# Patient Record
Sex: FEMALE | Race: BLACK OR AFRICAN AMERICAN | NOT HISPANIC OR LATINO | Employment: FULL TIME | URBAN - METROPOLITAN AREA
[De-identification: names, ages, dates, MRNs, and addresses within clinical notes are randomized per-mention and may not be internally consistent; named-entity substitution may affect disease eponyms.]

---

## 2022-12-17 ENCOUNTER — HOSPITAL ENCOUNTER (OUTPATIENT)
Facility: HOSPITAL | Age: 27
Setting detail: OBSERVATION
Discharge: HOME | End: 2022-12-18
Attending: EMERGENCY MEDICINE | Admitting: EMERGENCY MEDICINE

## 2022-12-17 DIAGNOSIS — R10.31 RIGHT LOWER QUADRANT ABDOMINAL PAIN: Primary | ICD-10-CM

## 2022-12-17 PROBLEM — R10.9 ABDOMINAL PAIN: Status: ACTIVE | Noted: 2022-12-17

## 2022-12-17 LAB
ALBUMIN SERPL-MCNC: 4.6 G/DL (ref 3.5–5.2)
ALBUMIN/GLOB SERPL: 1.5 G/DL
ALP SERPL-CCNC: 56 U/L (ref 39–117)
ALT SERPL W P-5'-P-CCNC: 61 U/L (ref 1–33)
ANION GAP SERPL CALCULATED.3IONS-SCNC: 10 MMOL/L (ref 5–15)
AST SERPL-CCNC: 29 U/L (ref 1–32)
BASOPHILS # BLD AUTO: 0.02 10*3/MM3 (ref 0–0.2)
BASOPHILS NFR BLD AUTO: 0.4 % (ref 0–1.5)
BILIRUB SERPL-MCNC: 0.4 MG/DL (ref 0–1.2)
BILIRUB UR QL STRIP: NEGATIVE
BUN SERPL-MCNC: 10 MG/DL (ref 6–20)
BUN/CREAT SERPL: 14.3 (ref 7–25)
CALCIUM SPEC-SCNC: 9.2 MG/DL (ref 8.6–10.5)
CHLORIDE SERPL-SCNC: 103 MMOL/L (ref 98–107)
CLARITY UR: CLEAR
CO2 SERPL-SCNC: 24 MMOL/L (ref 22–29)
COLOR UR: YELLOW
CREAT SERPL-MCNC: 0.7 MG/DL (ref 0.57–1)
DEPRECATED RDW RBC AUTO: 41.8 FL (ref 37–54)
EGFRCR SERPLBLD CKD-EPI 2021: 122.5 ML/MIN/1.73
EOSINOPHIL # BLD AUTO: 0.07 10*3/MM3 (ref 0–0.4)
EOSINOPHIL NFR BLD AUTO: 1.4 % (ref 0.3–6.2)
ERYTHROCYTE [DISTWIDTH] IN BLOOD BY AUTOMATED COUNT: 12.7 % (ref 12.3–15.4)
GLOBULIN UR ELPH-MCNC: 3.1 GM/DL
GLUCOSE SERPL-MCNC: 81 MG/DL (ref 65–99)
GLUCOSE UR STRIP-MCNC: NEGATIVE MG/DL
HCG INTACT+B SERPL-ACNC: 6.42 MIU/ML
HCT VFR BLD AUTO: 38.3 % (ref 34–46.6)
HGB BLD-MCNC: 12.6 G/DL (ref 12–15.9)
HGB UR QL STRIP.AUTO: NEGATIVE
IMM GRANULOCYTES # BLD AUTO: 0.01 10*3/MM3 (ref 0–0.05)
IMM GRANULOCYTES NFR BLD AUTO: 0.2 % (ref 0–0.5)
KETONES UR QL STRIP: NEGATIVE
LEUKOCYTE ESTERASE UR QL STRIP.AUTO: NEGATIVE
LYMPHOCYTES # BLD AUTO: 1.24 10*3/MM3 (ref 0.7–3.1)
LYMPHOCYTES NFR BLD AUTO: 25.7 % (ref 19.6–45.3)
MCH RBC QN AUTO: 29.2 PG (ref 26.6–33)
MCHC RBC AUTO-ENTMCNC: 32.9 G/DL (ref 31.5–35.7)
MCV RBC AUTO: 88.7 FL (ref 79–97)
MONOCYTES # BLD AUTO: 0.7 10*3/MM3 (ref 0.1–0.9)
MONOCYTES NFR BLD AUTO: 14.5 % (ref 5–12)
NEUTROPHILS NFR BLD AUTO: 2.79 10*3/MM3 (ref 1.7–7)
NEUTROPHILS NFR BLD AUTO: 57.8 % (ref 42.7–76)
NITRITE UR QL STRIP: NEGATIVE
NRBC BLD AUTO-RTO: 0 /100 WBC (ref 0–0.2)
PH UR STRIP.AUTO: 7.5 [PH] (ref 5–8)
PLATELET # BLD AUTO: 315 10*3/MM3 (ref 140–450)
PMV BLD AUTO: 8.8 FL (ref 6–12)
POTASSIUM SERPL-SCNC: 3.7 MMOL/L (ref 3.5–5.2)
PROT SERPL-MCNC: 7.7 G/DL (ref 6–8.5)
PROT UR QL STRIP: NEGATIVE
RBC # BLD AUTO: 4.32 10*6/MM3 (ref 3.77–5.28)
SODIUM SERPL-SCNC: 137 MMOL/L (ref 136–145)
SP GR UR STRIP: 1.02 (ref 1–1.03)
UROBILINOGEN UR QL STRIP: NORMAL
WBC NRBC COR # BLD: 4.83 10*3/MM3 (ref 3.4–10.8)

## 2022-12-17 PROCEDURE — 85025 COMPLETE CBC W/AUTO DIFF WBC: CPT | Performed by: PHYSICIAN ASSISTANT

## 2022-12-17 PROCEDURE — 81003 URINALYSIS AUTO W/O SCOPE: CPT | Performed by: PHYSICIAN ASSISTANT

## 2022-12-17 PROCEDURE — 80053 COMPREHEN METABOLIC PANEL: CPT | Performed by: PHYSICIAN ASSISTANT

## 2022-12-17 PROCEDURE — 84702 CHORIONIC GONADOTROPIN TEST: CPT | Performed by: PHYSICIAN ASSISTANT

## 2022-12-17 PROCEDURE — G0378 HOSPITAL OBSERVATION PER HR: HCPCS

## 2022-12-17 PROCEDURE — 99285 EMERGENCY DEPT VISIT HI MDM: CPT

## 2022-12-17 RX ORDER — ONDANSETRON 2 MG/ML
4 INJECTION INTRAMUSCULAR; INTRAVENOUS EVERY 6 HOURS PRN
Status: DISCONTINUED | OUTPATIENT
Start: 2022-12-17 | End: 2022-12-18 | Stop reason: HOSPADM

## 2022-12-17 RX ORDER — HYDROCODONE BITARTRATE AND ACETAMINOPHEN 5; 325 MG/1; MG/1
1 TABLET ORAL ONCE
Status: COMPLETED | OUTPATIENT
Start: 2022-12-17 | End: 2022-12-17

## 2022-12-17 RX ORDER — SODIUM CHLORIDE 9 MG/ML
40 INJECTION, SOLUTION INTRAVENOUS AS NEEDED
Status: DISCONTINUED | OUTPATIENT
Start: 2022-12-17 | End: 2022-12-18 | Stop reason: HOSPADM

## 2022-12-17 RX ORDER — ONDANSETRON 4 MG/1
4 TABLET, FILM COATED ORAL EVERY 6 HOURS PRN
Status: DISCONTINUED | OUTPATIENT
Start: 2022-12-17 | End: 2022-12-18 | Stop reason: HOSPADM

## 2022-12-17 RX ORDER — SODIUM CHLORIDE 0.9 % (FLUSH) 0.9 %
10 SYRINGE (ML) INJECTION AS NEEDED
Status: DISCONTINUED | OUTPATIENT
Start: 2022-12-17 | End: 2022-12-18 | Stop reason: HOSPADM

## 2022-12-17 RX ORDER — SODIUM CHLORIDE 0.9 % (FLUSH) 0.9 %
10 SYRINGE (ML) INJECTION EVERY 12 HOURS SCHEDULED
Status: DISCONTINUED | OUTPATIENT
Start: 2022-12-17 | End: 2022-12-18 | Stop reason: HOSPADM

## 2022-12-17 RX ORDER — ACETAMINOPHEN 325 MG/1
650 TABLET ORAL EVERY 4 HOURS PRN
Status: DISCONTINUED | OUTPATIENT
Start: 2022-12-17 | End: 2022-12-18 | Stop reason: HOSPADM

## 2022-12-17 RX ADMIN — Medication 10 ML: at 20:02

## 2022-12-17 RX ADMIN — HYDROCODONE BITARTRATE AND ACETAMINOPHEN 1 TABLET: 5; 325 TABLET ORAL at 20:01

## 2022-12-17 NOTE — ED PROVIDER NOTES
" EMERGENCY DEPARTMENT ENCOUNTER    Room Number:  112/1  Date seen:  12/17/2022  PCP: System, Provider Not In  Historian: Patient      HPI:  Chief Complaint: Abdominal pain  A complete HPI/ROS/PMH/PSH/SH/FH are unobtainable due to: None  Context: Jessy Hope is a 27 y.o. female who presents to the ED c/o lower abdominal pain x 1 week. She denies nausea, vomiting, diarrhea, urinary symptoms, chest pain, soa, fevers, or chills. Pt denies abdominal surgeries. Pt was seen at urgent care prior to arrival and states she was told to come to ER for further evaluation due to a possible \"faintly positive\" pregnancy test. Pt states she has regular periods and last period was 11/19/2022. No hx of ectopic pregnancy or PID.            PAST MEDICAL HISTORY  Active Ambulatory Problems     Diagnosis Date Noted   • No Active Ambulatory Problems     Resolved Ambulatory Problems     Diagnosis Date Noted   • No Resolved Ambulatory Problems     No Additional Past Medical History         PAST SURGICAL HISTORY  No past surgical history on file.      FAMILY HISTORY  Family History   Problem Relation Age of Onset   • Diabetes Mother    • Hypertension Mother    • Diabetes Father    • Hypertension Father    • Diabetes Brother          SOCIAL HISTORY  Social History     Socioeconomic History   • Marital status: Single   Tobacco Use   • Smoking status: Never   • Smokeless tobacco: Never         ALLERGIES  Morphine        REVIEW OF SYSTEMS  Review of Systems   Constitutional: Negative for chills and fever.   Respiratory: Negative for cough and shortness of breath.    Cardiovascular: Negative for chest pain.   Gastrointestinal: Positive for abdominal pain. Negative for constipation, diarrhea, nausea and vomiting.   Genitourinary: Negative for dysuria, flank pain, vaginal bleeding and vaginal discharge.          PHYSICAL EXAM  ED Triage Vitals [12/17/22 1320]   Temp Heart Rate Resp BP SpO2   98.3 °F (36.8 °C) 101 18 136/90 98 %      Temp src " Heart Rate Source Patient Position BP Location FiO2 (%)   Tympanic Monitor -- -- --       Physical Exam      GENERAL: no acute distress, nontoxic  HENT: nares patent  EYES: no scleral icterus  CV: regular rhythm, normal rate  RESPIRATORY: normal effort, CTAB  ABDOMEN: soft, mild suprapubic and RLQ discomfort to palpation, no guarding, no rigidity  MUSCULOSKELETAL: no deformity  NEURO: alert, moves all extremities, follows commands  PSYCH:  calm, cooperative  SKIN: warm, dry    Vital signs and nursing notes reviewed.          LAB RESULTS  Labs Reviewed   COMPREHENSIVE METABOLIC PANEL - Abnormal; Notable for the following components:       Result Value    ALT (SGPT) 61 (*)     All other components within normal limits    Narrative:     GFR Normal >60  Chronic Kidney Disease <60  Kidney Failure <15     CBC WITH AUTO DIFFERENTIAL - Abnormal; Notable for the following components:    Monocyte % 14.5 (*)     All other components within normal limits   CBC (NO DIFF) - Abnormal; Notable for the following components:    RDW 12.1 (*)     All other components within normal limits   URINALYSIS W/ MICROSCOPIC IF INDICATED (NO CULTURE) - Normal    Narrative:     Urine microscopic not indicated.   BASIC METABOLIC PANEL - Normal    Narrative:     GFR Normal >60  Chronic Kidney Disease <60  Kidney Failure <15     HCG, QUANTITATIVE, PREGNANCY    Narrative:     HCG Ranges by Gestational Age    Females - non-pregnant premenopausal   </= 1mIU/mL HCG  Females - postmenopausal               </= 7mIU/mL HCG    3 Weeks       5.4   -      72 mIU/mL  4 Weeks      10.2   -     708 mIU/mL  5 Weeks       217   -   8,245 mIU/mL  6 Weeks       152   -  32,177 mIU/mL  7 Weeks     4,059   - 153,767 mIU/mL  8 Weeks    31,366   - 149,094 mIU/mL  9 Weeks    59,109   - 135,901 mIU/mL  10 Weeks   44,186   - 170,409 mIU/mL  12 Weeks   27,107   - 201,615 mIU/mL  14 Weeks   24,302   -  93,646 mIU/mL  15 Weeks   12,540   -  69,747 mIU/mL  16 Weeks    8,904    -  55,332 mIU/mL  17 Weeks    8,240   -  51,793 mIU/mL  18 Weeks    9,649   -  55,271 mIU/mL    Results may be falsely decreased if patient taking Biotin.     HCG, QUANTITATIVE, PREGNANCY    Narrative:     HCG Ranges by Gestational Age    Females - non-pregnant premenopausal   </= 1mIU/mL HCG  Females - postmenopausal               </= 7mIU/mL HCG    3 Weeks       5.4   -      72 mIU/mL  4 Weeks      10.2   -     708 mIU/mL  5 Weeks       217   -   8,245 mIU/mL  6 Weeks       152   -  32,177 mIU/mL  7 Weeks     4,059   - 153,767 mIU/mL  8 Weeks    31,366   - 149,094 mIU/mL  9 Weeks    59,109   - 135,901 mIU/mL  10 Weeks   44,186   - 170,409 mIU/mL  12 Weeks   27,107   - 201,615 mIU/mL  14 Weeks   24,302   -  93,646 mIU/mL  15 Weeks   12,540   -  69,747 mIU/mL  16 Weeks    8,904   -  55,332 mIU/mL  17 Weeks    8,240   -  51,793 mIU/mL  18 Weeks    9,649   -  55,271 mIU/mL    Results may be falsely decreased if patient taking Biotin.     CBC AND DIFFERENTIAL    Narrative:     The following orders were created for panel order CBC & Differential.  Procedure                               Abnormality         Status                     ---------                               -----------         ------                     CBC Auto Differential[[id number]]        Abnormal            Final result                 Please view results for these tests on the individual orders.       Ordered the above labs and reviewed the results.        RADIOLOGY  No Radiology Exams Resulted Within Past 24 Hours    none          PROCEDURES  Procedures        MEDICATIONS GIVEN IN ER  Medications   sodium chloride 0.9 % flush 10 mL (10 mL Intravenous Given 12/18/22 0903)   sodium chloride 0.9 % flush 10 mL (has no administration in time range)   sodium chloride 0.9 % infusion 40 mL (has no administration in time range)   ondansetron (ZOFRAN) tablet 4 mg (has no administration in time range)     Or   ondansetron (ZOFRAN) injection 4 mg (has no  administration in time range)   acetaminophen (TYLENOL) tablet 650 mg (has no administration in time range)   lactated ringers infusion (125 mL/hr Intravenous Currently Infusing 12/18/22 0559)   HYDROcodone-acetaminophen (NORCO) 5-325 MG per tablet 1 tablet (1 tablet Oral Given 12/17/22 2001)   HYDROcodone-acetaminophen (NORCO) 5-325 MG per tablet 1 tablet (1 tablet Oral Given 12/18/22 0409)           MEDICAL DECISION MAKING, PROGRESS, and CONSULTS    All labs have been independently reviewed by me.  All radiology studies have been reviewed by me and I have also reviewed the radiology report.   EKG's independently viewed and interpreted by me.  Discussion below represents my analysis of pertinent findings related to patient's condition, differential diagnosis, treatment plan and final disposition.      Additional sources:    - External (non-ED) record review: Reviewed urgent care visit prior to arrival.      Orders placed during this visit:  Orders Placed This Encounter   Procedures   • MRI Abdomen Without Contrast   • Comprehensive Metabolic Panel   • hCG, Quantitative, Pregnancy   • Urinalysis With Microscopic If Indicated (No Culture) - Urine, Clean Catch   • CBC Auto Differential   • Potassium   • Magnesium   • Troponin   • Blood Gas, Arterial -   • CBC (No Diff)   • Basic Metabolic Panel   • hCG, Quantitative, Pregnancy   • NPO Diet NPO Type: Strict NPO   • Continuous Cardiac Monitoring   • Telemetry - Pulse Oximetry   • May Be Off Telemetry for Tests   • Notify Provider if ACLS Protocol Activated   • Intake & Output   • Weigh Patient   • Saline Lock & Maintain IV Access   • Vital Signs   • Activity - Ad Annelise   • Code Status and Medical Interventions:   • OB/GYN (on-call MD unless specified)   • Inpatient General Surgery Consult   • Oxygen Therapy- Nasal Cannula; Titrate for SPO2: 90% - 95%   • ECG 12 Lead Other; Acute Chest Pain or Dysrhythmia   • Insert Peripheral IV   • Initiate ED Observation Status   • CBC  & Differential         Additional orders considered but not ordered:  Us appendix-likely not going to be helpful due to patient's body habitus.    CT abd/pelvis for evaluation due to hcg quant being less than threshold however patient does not wish to proceed due to concern she could potentially be pregnant.        Differential diagnosis:    My differential diagnosis for abdominal pain includes but is not limited to:    Gastritis, gastroenteritis, peptic ulcer disease, GERD, esophageal perforation, acute appendicitis, mesenteric adenitis, Meckel’s diverticulum, epiploic appendagitis, diverticulitis, colon cancer, ulcerative colitis, Crohn’s disease, intussusception, small bowel obstruction, adhesions, ischemic bowel, perforated viscus, ileus, obstipation, biliary colic, cholecystitis, cholelithiasis, Ashkan-Iban Severiano, hepatitis, pancreatitis, common bile duct obstruction, cholangitis, bile leak, splenic trauma, splenic rupture, splenic infarction, splenic abscess, abdominal abscess, ascites, spontaneous bacterial peritonitis, hernia, UTI, cystitis, ureterolithiasis, urinary obstruction, ovarian cyst, torsion, pregnancy, ectopic pregnancy, PID, pelvic abscess, mittelschmerz, endometriosis, AAA, myocardial infarction, pneumonia, cancer, porphyria, DKA, medications, sickle cell, viral syndrome, herpes zoster        Independent interpretation of labs, radiology studies, and discussions with consultants:  ED Course as of 12/18/22 1154   Sat Dec 17, 2022   1453 WBC: 4.83 [DC]   1453 Hemoglobin: 12.6 [DC]   1505 HCG Quantitative: 6.42 [DC]   1508 We will consult OB/GYN regarding hCG quantitative level and whether or not we can proceed with CT imaging for right lower quadrant pain. [DC]   1510 Discussed case with OBGYN, Dr. Jolley, who states hCG quantitative can be seen as negative pregnancy test.  States okay to move forward with CT imaging if needed. [DC]   1513 Discussed with patient her lab results.  Relayed that I had  discussed with OB/GYN and it would be okay to proceed with CT imaging for further evaluation of her right lower quadrant pain.  Discussed that I cannot rule out intra-abdominal pathology such as appendicitis without CT imaging.  She would like to hold off on any additional imaging at this time and will plan to return if her symptoms worsen.  Labs are reassuring speculate this is reasonable at this time. [DC]   1530 Patient seen by attending physician Dr. Batista.  Recommending admission for MRI of abdomen pelvis to evaluate for appendicitis.  Patient does not want CT due to her concern that she is pregnant.   [DC]   1558 Discussed case with Beatriz Lujan, TRACEE with [facility], who has consulted with supervising physician Dr. Bird and will accept admission to the observation unit. [DC]      ED Course User Index  [DC] Mahsa Vaca PA              Patient was placed in face mask in first look. Patient was wearing facemask when I entered the room and throughout our encounter. I wore full protective equipment throughout this patient encounter including a face mask, and gloves. Hand hygiene was performed before donning protective equipment and after removal when leaving the room.      DIAGNOSIS  Final diagnoses:   Right lower quadrant abdominal pain         DISPOSITION  Admit to Observation            Latest Documented Vital Signs:  As of 11:54 EST  BP- 106/63 HR- 70 Temp- 98.3 °F (36.8 °C) (Oral) O2 sat- 99%        --    Please note that portions of this were completed with a voice recognition program.       Note Disclaimer: At [facility], we believe that sharing information builds trust and better relationships. You are receiving this note because you are receiving care at [facility] or recently visited. It is possible you will see health information before a provider has talked with you about it. This kind of information can be easy to misunderstand. To help you fully understand what it means for your health, we  urge you to discuss this note with your provider.           Mahsa Vaca PA  12/18/22 0142

## 2022-12-17 NOTE — H&P
[facility]   HISTORY AND PHYSICAL    Patient Name: Jessy Hope  : 1995  MRN: [id number]  Primary Care Physician:  System, Provider Not In  Date of admission: 2022    Subjective   Subjective     Chief Complaint:   Chief Complaint   Patient presents with   • Abdominal Pain         HPI:    Jessy Hope is a 27 y.o. female -1-1-1, trisomy 18 fetus in second pregnancy, who presented to the emergency department today complaining of right lower quadrant abdominal pain.  Patient states pain started near her bellybutton approximately 4 days ago and has migrated to right lower quadrant.  Patient states last menstrual period was on .  Patient states when she developed abdominal pain she took pregnancy test at home and it was a very faint positive.  Patient then presented to urgent care where she had another pregnancy test that was again a very faint positive. Urgent care therefore sent patient to the emergency department.  Patient reports abdominal pain currently 8 out of 10.    In emergency department patient's hCG quantitative was 6.42.  ED provider discussed with OB/GYN who did not feel this was consistent with positive pregnancy test.  However, patient did not want to get CT scan as she felt there was still possibility of her being pregnant.  ED provider therefore wanted to pursue MRI of abdomen to rule out appendicitis.  Patient's laboratory evaluation is entirely within normal limits.  Patient heart rate 101 on arrival, otherwise she has not been tachycardic and she is not febrile.  Patient denies chest pain, shortness of breath, palpitations, nausea, vomiting, diarrhea, fever, chills, recent sick contacts.    Review of Systems   All systems were reviewed and negative except for: What is discussed in the HPI    Personal History     No past medical history on file.    No past surgical history on file.    Family History: family history includes Diabetes in her brother, father, and mother;  Hypertension in her father and mother. Otherwise pertinent FHx was reviewed and not pertinent to current issue.    Social History:  reports that she has never smoked. She has never used smokeless tobacco.    Home Medications:       Allergies:  Allergies   Allergen Reactions   • Morphine Hives       Objective   Objective     Vitals:   Temp:  [98.3 °F (36.8 °C)-98.8 °F (37.1 °C)] 98.8 °F (37.1 °C)  Heart Rate:  [] 79  Resp:  [18] 18  BP: (122-136)/(79-90) 128/80  Physical Exam     GENERAL: 27 y.o. YO female a/o x 4, NAD  SKIN: Warm pink and dry   HEENT:  PERRL, EOM intact, conjunctivae normal, sclerae clear  NECK: supple, no JVD  LUNGS: Clear to auscultation bilaterally without wheezes, rales or rhonchi.  No accessory muscle use and no nasal flaring.  CARDIAC:  Regular rate and rhythm, S1-S2.  No murmurs, rubs or gallops.  No peripheral edema.  Equal pulses bilaterally.  ABDOMEN: Soft, tender to palpation right lower quadrant at McBurney's point, psoas sign negative, obturator sign negative, no rebound tenderness, nondistended.  No guarding or rebound tenderness.  Normal bowel sounds.  MUSCULOSKELETAL: Moves all extremities well.  No deformity.  NEURO: Cranial nerves II through XII grossly intact.  No gross focal deficits.  Alert.  Normal speech and motor.  PSYCH: Normal mood and affect      Result Review    Result Review:  I have personally reviewed the results from the time of this admission to 12/17/2022 17:56 EST and agree with these findings:  [x]  Laboratory list / accordion  []  Microbiology  []  Radiology  []  EKG/Telemetry   []  Cardiology/Vascular   []  Pathology  []  Old records  []  Other:  Most notable findings include: hCG quantitative 6.42      Assessment & Plan   Assessment / Plan     Brief Patient Summary:  Jessy Hope is a 27 y.o. female who is being admitted to the observation unit for further evaluation of abdominal pain in the setting of potential very early pregnancy.  Patient does not  want to pursue CT scan at this time and therefore MRI abdomen will be obtained to rule out appendicitis.    Active Hospital Problems:  Active Hospital Problems    Diagnosis    • **Abdominal pain      Plan:     Abdominal pain  -Patient with 2 faint positive pregnancy test, quantitative hCG in ED is 6.42  -We will obtain MRI abdomen to rule out appendicitis  -General surgery consult pending results of imaging  -Monitor for now      DVT prophylaxis:  Mechanical DVT prophylaxis orders are present.    CODE STATUS:    Level Of Support Discussed With: Patient  Code Status (Patient has no pulse and is not breathing): CPR (Attempt to Resuscitate)  Medical Interventions (Patient has pulse or is breathing): Full Support    Admission Status:  I believe this patient meets observation status.    I wore an N95 mask, face shield, and gloves during this patient encounter. Patient also wearing a surgical mask throughout encounter. Hand hygeine performed before and after seeing the patient.    Electronically signed by ERMIAS Adame, 12/17/22, 5:44 PM EST.

## 2022-12-17 NOTE — DISCHARGE INSTRUCTIONS
Follow-up with your primary care provider and OB/GYN.  Return to the emergency department with worsening symptoms, uncontrolled pain, inability to tolerate oral liquids, fever greater than 101°F not controlled by Tylenol or as needed with emergent concerns.

## 2022-12-17 NOTE — ED NOTES
.Nursing report ED to floor  Jessy Hope  27 y.o.  female    HPI :   Chief Complaint   Patient presents with    Abdominal Pain       Admitting doctor:   Rigo Bird MD    Admitting diagnosis:   The encounter diagnosis was Right lower quadrant abdominal pain.    Code status:   Current Code Status       Date Active Code Status Order ID Comments User Context       12/17/2022 1559 CPR (Attempt to Resuscitate) [id number]  Beatriz Lujan PA ED        Question Answer    Code Status (Patient has no pulse and is not breathing) CPR (Attempt to Resuscitate)    Medical Interventions (Patient has pulse or is breathing) Full Support    Level Of Support Discussed With Patient                    Allergies:   Morphine    Isolation:   No active isolations    Intake and Output  No intake or output data in the 24 hours ending 12/17/22 1601    Weight:   There were no vitals filed for this visit.    Most recent vitals:   Vitals:    12/17/22 1320 12/17/22 1421 12/17/22 1451   BP: 136/90 122/79 126/80   BP Location:   Left arm   Patient Position:   Lying   Pulse: 101 86 88   Resp: 18  18   Temp: 98.3 °F (36.8 °C)     TempSrc: Tympanic     SpO2: 98% 100% 100%       Active LDAs/IV Access:   Lines, Drains & Airways       Active LDAs       Name Placement date Placement time Site Days    Peripheral IV 12/17/22 1431 Right Antecubital 12/17/22  1431  Antecubital  less than 1                    Labs (abnormal labs have a star):   Labs Reviewed   COMPREHENSIVE METABOLIC PANEL - Abnormal; Notable for the following components:       Result Value    ALT (SGPT) 61 (*)     All other components within normal limits    Narrative:     GFR Normal >60  Chronic Kidney Disease <60  Kidney Failure <15     CBC WITH AUTO DIFFERENTIAL - Abnormal; Notable for the following components:    Monocyte % 14.5 (*)     All other components within normal limits   URINALYSIS W/ MICROSCOPIC IF INDICATED (NO CULTURE) - Normal    Narrative:     Urine microscopic not  indicated.   HCG, QUANTITATIVE, PREGNANCY    Narrative:     HCG Ranges by Gestational Age    Females - non-pregnant premenopausal   </= 1mIU/mL HCG  Females - postmenopausal               </= 7mIU/mL HCG    3 Weeks       5.4   -      72 mIU/mL  4 Weeks      10.2   -     708 mIU/mL  5 Weeks       217   -   8,245 mIU/mL  6 Weeks       152   -  32,177 mIU/mL  7 Weeks     4,059   - 153,767 mIU/mL  8 Weeks    31,366   - 149,094 mIU/mL  9 Weeks    59,109   - 135,901 mIU/mL  10 Weeks   44,186   - 170,409 mIU/mL  12 Weeks   27,107   - 201,615 mIU/mL  14 Weeks   24,302   -  93,646 mIU/mL  15 Weeks   12,540   -  69,747 mIU/mL  16 Weeks    8,904   -  55,332 mIU/mL  17 Weeks    8,240   -  51,793 mIU/mL  18 Weeks    9,649   -  55,271 mIU/mL    Results may be falsely decreased if patient taking Biotin.     CBC AND DIFFERENTIAL    Narrative:     The following orders were created for panel order CBC & Differential.  Procedure                               Abnormality         Status                     ---------                               -----------         ------                     CBC Auto Differential[[id number]]        Abnormal            Final result                 Please view results for these tests on the individual orders.       EKG:   No orders to display       Meds given in ED:   Medications   sodium chloride 0.9 % flush 10 mL (has no administration in time range)   sodium chloride 0.9 % flush 10 mL (has no administration in time range)   sodium chloride 0.9 % infusion 40 mL (has no administration in time range)   ondansetron (ZOFRAN) tablet 4 mg (has no administration in time range)     Or   ondansetron (ZOFRAN) injection 4 mg (has no administration in time range)   acetaminophen (TYLENOL) tablet 650 mg (has no administration in time range)       Imaging results:  No radiology results for the last day    Ambulatory status:   - ad gavin    Social issues:   Social History     Socioeconomic History    Marital status:  Single       NIH Stroke Scale:         Lora Sandoval RN  12/17/22 16:01 EST

## 2022-12-17 NOTE — ED TRIAGE NOTES
Patient to ed from  with complaints of abd pain. Patient states that she was seen at urgent care and received a positive pregnancy test. LMP nov 19th

## 2022-12-17 NOTE — PROGRESS NOTES
Clinical Pharmacy Services: Medication History    Jessy Hope is a 27 y.o. female presenting to [facility] for   Chief Complaint   Patient presents with   • Abdominal Pain       She  has no past medical history on file.    Allergies as of 12/17/2022 - Reviewed 12/17/2022   Allergen Reaction Noted   • Morphine Hives 12/17/2022       Medication information was obtained from: Patient  Pharmacy and Phone Number:     Prior to Admission Medications     None            Medication notes: Pt states she doesn't take any medications. Pt hasn't had any medications in the last three months.    This medication list is complete to the best of my knowledge as of 12/17/2022    Please call if questions.  Claire Whitaker  Medication History Technician   [phone]    12/17/2022 16:43 EST

## 2022-12-17 NOTE — CASE MANAGEMENT/SOCIAL WORK
Discharge Planning Assessment  [facility]     Patient Name: Jessy Hope  MRN: [id number]  Today's Date: 12/17/2022    Admit Date: 12/17/2022    Plan: Pt intends to return home upon discharge   Discharge Needs Assessment     Row Name 12/17/22 1651       Living Environment    People in Home significant other    Name(s) of People in Home Armando Shelley [phone]    Current Living Arrangements home    Primary Care Provided by self    Provides Primary Care For no one    Family Caregiver if Needed significant other    Family Caregiver Names Armando Shelley    Quality of Family Relationships helpful;involved    Able to Return to Prior Arrangements yes       Resource/Environmental Concerns    Resource/Environmental Concerns none    Transportation Concerns none       Transition Planning    Patient/Family Anticipates Transition to home;home with family    Patient/Family Anticipated Services at Transition none    Transportation Anticipated car, drives self;family or friend will provide       Discharge Needs Assessment    Readmission Within the Last 30 Days no previous admission in last 30 days    Equipment Currently Used at Home none    Concerns to be Addressed no discharge needs identified;denies needs/concerns at this time    Anticipated Changes Related to Illness none    Equipment Needed After Discharge none    Provided Post Acute Provider List? N/A    Provided Post Acute Provider Quality & Resource List? N/A               Discharge Plan     Row Name 12/17/22 1652       Plan    Plan Pt intends to return home upon discharge    Patient/Family in Agreement with Plan yes    Provided Post Acute Provider List? N/A    Provided Post Acute Provider Quality & Resource List? N/A    Plan Comments Spoke with pt at [facility]. Introduced self and explained role of CCP . Verified information on face sheet and that pt does have a PCP. Pt denies any difficulty paying for medications and that she obtains medications from [facility] on [address]. Pt is independent in ADL's and does not use any assistive devices. Denies the need for any assistive devices or community resources at this time. Advised pt to contact case management if any further needs arise              Continued Care and Services - Admitted Since 12/17/2022    Coordination has not been started for this encounter.          Demographic Summary    No documentation.                Functional Status    No documentation.                Psychosocial    No documentation.                Abuse/Neglect    No documentation.                Legal    No documentation.                Substance Abuse    No documentation.                Patient Forms    No documentation.                   Leah [place] RN

## 2022-12-17 NOTE — ED PROVIDER NOTES
Subjective   History of Present Illness  27-year-old female with past medical history here for chief complaint of right lower quad abdominal pain.  History was obtained from the patient.  The patient states that her symptoms started 1 week ago.  She states that she went to an urgent care clinic.  She states that her pregnancy test was faintly positive.  Therefore she was sent to the ED for further management.  Patient states that she does have right lower quadrant pain, nothing makes it worse, nothing makes it better, sharp.  She denies any urinary symptoms.  Patient denies any vaginal bleeding or discharge.  Patient denies any nausea, vomiting, diarrhea, fevers, chills, chest pain, shortness of breath, or any other symptoms.        Review of Systems   All other systems reviewed and are negative.      No past medical history on file.    Allergies   Allergen Reactions   • Morphine Hives       No past surgical history on file.    No family history on file.    Social History     Socioeconomic History   • Marital status: Single           Objective   Physical Exam  Constitutional:       General: She is not in acute distress.     Appearance: She is not ill-appearing, toxic-appearing or diaphoretic.   HENT:      Head: Normocephalic and atraumatic.      Mouth/Throat:      Mouth: Mucous membranes are moist.      Pharynx: Oropharynx is clear. No pharyngeal swelling or oropharyngeal exudate.   Eyes:      General: No scleral icterus.     Extraocular Movements: Extraocular movements intact.      Pupils: Pupils are equal, round, and reactive to light.   Cardiovascular:      Rate and Rhythm: Normal rate and regular rhythm.      Heart sounds: Normal heart sounds. No murmur heard.    No friction rub. No gallop.   Pulmonary:      Effort: Pulmonary effort is normal. No respiratory distress.      Breath sounds: Normal breath sounds. No stridor. No wheezing, rhonchi or rales.   Chest:      Chest wall: No tenderness.   Abdominal:       General: Bowel sounds are normal. There is no distension.      Palpations: Abdomen is soft. There is no shifting dullness or fluid wave.      Tenderness: There is abdominal tenderness in the right lower quadrant. There is no right CVA tenderness, left CVA tenderness, guarding or rebound.   Skin:     General: Skin is warm and dry.      Capillary Refill: Capillary refill takes less than 2 seconds.      Coloration: Skin is not cyanotic or jaundiced.   Neurological:      General: No focal deficit present.      Mental Status: She is alert and oriented to person, place, and time.      Motor: No weakness.   Psychiatric:         Mood and Affect: Mood normal.         Procedures           ED Course  ED Course as of 12/17/22 1640   Sat Dec 17, 2022   1453 WBC: 4.83 [DC]   1453 Hemoglobin: 12.6 [DC]   1505 HCG Quantitative: 6.42 [DC]   1508 We will consult OB/GYN regarding hCG quantitative level and whether or not we can proceed with CT imaging for right lower quadrant pain. [DC]   1510 Discussed case with OBGYN, Dr. Jolley, who states hCG quantitative can be seen as negative pregnancy test.  States okay to move forward with CT imaging if needed. [DC]   1513 Discussed with patient her lab results.  Relayed that I had discussed with OB/GYN and it would be okay to proceed with CT imaging for further evaluation of her right lower quadrant pain.  Discussed that I cannot rule out intra-abdominal pathology such as appendicitis without CT imaging.  She would like to hold off on any additional imaging at this time and will plan to return if her symptoms worsen.  Labs are reassuring speculate this is reasonable at this time. [DC]   1530 Patient seen by attending physician Dr. Batista.  Recommending admission for MRI of abdomen pelvis to evaluate for appendicitis.  Patient does not want CT due to her concern that she is pregnant.   [DC]   1558 Discussed case with Beatriz Lujan, TRACEE with [facility], who has consulted with supervising physician   Pelon and will accept admission to the observation unit. [DC]      ED Course User Index  [DC] Mahsa Vaca PA                                           MDM  Number of Diagnoses or Management Options     Amount and/or Complexity of Data Reviewed  Clinical lab tests: ordered and reviewed  Decide to obtain previous medical records or to obtain history from someone other than the patient: yes    Risk of Complications, Morbidity, and/or Mortality  General comments: When I first saw the patient, the patient appeared nontoxic.  Patient was stable.  Patient was complaining of right lower quad abdominal pain.  We did get IV and labs.  Patient's labs were remarkable for mild elevation in beta hCG.  Given this, we did call OB and speak to them.  OB did not feel like the patient had a positive test for pregnancy.  We did inform the patient of this.  The patient however was hesitant to get a CT scan given that she had taken a pregnancy test at home and she told me that she thought it was positive.  Given the 2 pregnancy tests she did not feel comfortable getting a CT scan.  Patient did continue to have right lower quad abdominal pain.  We did discuss the possibility of getting an MRI if she is worried to rule out appendicitis given that she did have right lower quad abdominal pain.  Patient is okay with getting an MRI because she was very worried about being pregnant.  Given this, patient was admitted to the ED observation unit.  We spoke to Beatriz who is excepted the patient.  I defer all further management of this patient to the ED observation unit.  Patient is admitted to the ED observation unit for serial abdominal exams.  Patient is stable in the ED.        Final diagnoses:   Right lower quadrant abdominal pain       ED Disposition  ED Disposition     ED Disposition   Decision to Admit    Condition   --    Comment   --             No follow-up provider specified.       Medication List      No changes were made to  your prescriptions during this visit.          Luis Batista MD  12/17/22 1640

## 2022-12-18 ENCOUNTER — APPOINTMENT (OUTPATIENT)
Dept: MRI IMAGING | Facility: HOSPITAL | Age: 27
End: 2022-12-18

## 2022-12-18 VITALS
RESPIRATION RATE: 16 BRPM | DIASTOLIC BLOOD PRESSURE: 81 MMHG | HEIGHT: 69 IN | OXYGEN SATURATION: 98 % | WEIGHT: 256.1 LBS | HEART RATE: 93 BPM | TEMPERATURE: 98.6 F | SYSTOLIC BLOOD PRESSURE: 109 MMHG | BODY MASS INDEX: 37.93 KG/M2

## 2022-12-18 LAB
ANION GAP SERPL CALCULATED.3IONS-SCNC: 9.1 MMOL/L (ref 5–15)
BUN SERPL-MCNC: 11 MG/DL (ref 6–20)
BUN/CREAT SERPL: 16.9 (ref 7–25)
CALCIUM SPEC-SCNC: 9.3 MG/DL (ref 8.6–10.5)
CHLORIDE SERPL-SCNC: 104 MMOL/L (ref 98–107)
CO2 SERPL-SCNC: 24.9 MMOL/L (ref 22–29)
CREAT SERPL-MCNC: 0.65 MG/DL (ref 0.57–1)
DEPRECATED RDW RBC AUTO: 37.4 FL (ref 37–54)
EGFRCR SERPLBLD CKD-EPI 2021: 124.7 ML/MIN/1.73
ERYTHROCYTE [DISTWIDTH] IN BLOOD BY AUTOMATED COUNT: 12.1 % (ref 12.3–15.4)
GLUCOSE SERPL-MCNC: 92 MG/DL (ref 65–99)
HCG INTACT+B SERPL-ACNC: 14.8 MIU/ML
HCT VFR BLD AUTO: 35.3 % (ref 34–46.6)
HGB BLD-MCNC: 12 G/DL (ref 12–15.9)
MCH RBC QN AUTO: 28.4 PG (ref 26.6–33)
MCHC RBC AUTO-ENTMCNC: 34 G/DL (ref 31.5–35.7)
MCV RBC AUTO: 83.6 FL (ref 79–97)
PLATELET # BLD AUTO: 338 10*3/MM3 (ref 140–450)
PMV BLD AUTO: 9 FL (ref 6–12)
POTASSIUM SERPL-SCNC: 3.5 MMOL/L (ref 3.5–5.2)
RBC # BLD AUTO: 4.22 10*6/MM3 (ref 3.77–5.28)
SODIUM SERPL-SCNC: 138 MMOL/L (ref 136–145)
WBC NRBC COR # BLD: 4.06 10*3/MM3 (ref 3.4–10.8)

## 2022-12-18 PROCEDURE — G0378 HOSPITAL OBSERVATION PER HR: HCPCS

## 2022-12-18 PROCEDURE — 99203 OFFICE O/P NEW LOW 30 MIN: CPT | Performed by: SURGERY

## 2022-12-18 PROCEDURE — 85027 COMPLETE CBC AUTOMATED: CPT | Performed by: PHYSICIAN ASSISTANT

## 2022-12-18 PROCEDURE — 84702 CHORIONIC GONADOTROPIN TEST: CPT | Performed by: PHYSICIAN ASSISTANT

## 2022-12-18 PROCEDURE — 74181 MRI ABDOMEN W/O CONTRAST: CPT

## 2022-12-18 PROCEDURE — 80048 BASIC METABOLIC PNL TOTAL CA: CPT | Performed by: PHYSICIAN ASSISTANT

## 2022-12-18 RX ORDER — HYDROCODONE BITARTRATE AND ACETAMINOPHEN 5; 325 MG/1; MG/1
1 TABLET ORAL ONCE
Status: COMPLETED | OUTPATIENT
Start: 2022-12-18 | End: 2022-12-18

## 2022-12-18 RX ORDER — SODIUM CHLORIDE, SODIUM LACTATE, POTASSIUM CHLORIDE, CALCIUM CHLORIDE 600; 310; 30; 20 MG/100ML; MG/100ML; MG/100ML; MG/100ML
125 INJECTION, SOLUTION INTRAVENOUS CONTINUOUS
Status: DISCONTINUED | OUTPATIENT
Start: 2022-12-18 | End: 2022-12-18 | Stop reason: HOSPADM

## 2022-12-18 RX ADMIN — SODIUM CHLORIDE, POTASSIUM CHLORIDE, SODIUM LACTATE AND CALCIUM CHLORIDE 125 ML/HR: 600; 310; 30; 20 INJECTION, SOLUTION INTRAVENOUS at 03:38

## 2022-12-18 RX ADMIN — Medication 10 ML: at 09:03

## 2022-12-18 RX ADMIN — HYDROCODONE BITARTRATE AND ACETAMINOPHEN 1 TABLET: 5; 325 TABLET ORAL at 04:09

## 2022-12-18 NOTE — PROGRESS NOTES
MD Attestation Note    I supervised care provided by the midlevel provider.    The TRACEE and I have discussed this patient's history, physical exam, and treatment plan. I have reviewed the documentation and personally had a face to face interaction with the patient  I affirm the documentation and agree with the treatment and plan.       My personal findings are:        Subjective:  Patient presents for evaluation of abdominal pain.  Is localized to the right lower quadrant.  She had a positive pregnancy test yesterday.  Serum hCG was only 6.  She reports she has not had intercourse in the last 1 week and she is not even late for her menstrual period yet.  She does not have vaginal bleeding.  She denies fever or chills.        Objective:  GENERAL: Awake, alert, in no acute distress.     HEENT:  Normocephalic, atraumatic. Conjunctiva clear. Sclerae anicteric. Mucous membranes moist. Nares patent. Pupils equal, reactive. Extra-ocular movements normal.     RESPIRATORY: Lungs clear to auscultation bilaterally. Good air movement.   CARDIOVASCULAR: Regular rhythm, normal rate. No murmur. No chest wall tenderness.     ABDOMEN: Mild right lower quadrant tenderness without rebound or guarding.  Abdomen nondistended.  NEUROLOGIC: Cranial nerves II-XII normal. Motor strength 5/5 in extremities.   EXTREMITIES: No pedal edema. 2+ pedal pulses bilaterally. No deformity.     SKIN:  no rash, normal to inspection.        Assessment/ Plan:  Right lower quadrant abdominal pain  Patient evaluated by general surgery today.  They agreed that MRI of the abdomen is necessary to further rule out acute appendicitis.  I discussed with patient that MRI would be much safer than a CT of the abdomen in this circumstance, given concern for early pregnancy.  Patient is agreeable with plan to proceed with MRI.  If MRI demonstrates no acute surgical pathology, patient will need to follow-up with OB/GYN for continued trending of her serum hCG and a  pelvic ultrasound in 1 to 2 weeks.

## 2022-12-18 NOTE — CONSULTS
[facility]   Consult Note    Patient Name: Jessy Hope  : 1995  MRN: [id number]  Primary Care Physician:  System, Provider Not In  Referring Physician: Rigo Bird MD  Date of admission: 2022    Inpatient General Surgery Consult  Consult performed by: Lamonte Emery Jr., MD  Consult ordered by: Amy Marcano PA-C        Subjective   Subjective     Reason for Consult/ Chief Complaint: Abdominal pain    History of Present Illness  Jessy Hope is a pleasant 27 y.o. female who presented to the emergency room with abdominal pain.  She states that she has roughly a 1 week history of abdominal pain.  It began in the central abdomen and then slowly migrated to the right lower quadrant.  It has been located in the right lower quadrant for the past 3-4 days.  She has not had any nausea or vomiting and has been able to tolerate a regular diet.  Her bowel function has remained normal.  She has not had any fever nor night sweats.  The pain is constant.    She believes that she may be pregnant but is due to start her normal menstrual period tomorrow.  Beta-hCG was drawn which was mildly elevated and the patient did not want a CT scan of the abdomen and pelvis over concern that she may be early in a pregnancy.  OB/GYN has been consulted for their input.  An MRI has been ordered for evaluation of the right lower quadrant.    Review of Systems   Constitutional: Negative for fatigue and fever.   Respiratory: Negative for chest tightness and shortness of breath.    Cardiovascular: Negative for chest pain and palpitations.   Gastrointestinal: Positive for abdominal pain. Negative for blood in stool, constipation, diarrhea, nausea and vomiting.        Personal History     History reviewed. No pertinent past medical history.    No past surgical history on file.    Family History: family history includes Diabetes in her brother, father, and mother; Hypertension in her father and mother. Otherwise  pertinent FHx was reviewed and not pertinent to current issue.    Social History:  reports that she has never smoked. She has never used smokeless tobacco.    Home Medications:        Allergies:  Allergies   Allergen Reactions   • Morphine Hives       Objective    Objective     Vitals:  Temp:  [98.1 °F (36.7 °C)-98.8 °F (37.1 °C)] 98.1 °F (36.7 °C)  Heart Rate:  [] 76  Resp:  [16-18] 18  BP: (110-136)/(66-90) 110/87    Physical Exam  Constitutional:       Appearance: Normal appearance. She is well-developed. She is not toxic-appearing.   Eyes:      General: No scleral icterus.  Pulmonary:      Effort: Pulmonary effort is normal. No respiratory distress.   Abdominal:      Palpations: Abdomen is soft.      Tenderness: There is abdominal tenderness (Moderately tender to deep palpation) in the right lower quadrant. There is no guarding or rebound.   Skin:     General: Skin is warm and dry.   Neurological:      Mental Status: She is alert and oriented to person, place, and time.   Psychiatric:         Behavior: Behavior normal.         Thought Content: Thought content normal.         Judgment: Judgment normal.         Result Review    Result Review:  I have personally reviewed the results from the time of this admission to 12/18/2022 08:55 EST and agree with these findings:  [x]  Laboratory list / accordion  []  Microbiology  []  Radiology  []  EKG/Telemetry   []  Cardiology/Vascular   []  Pathology  []  Old records  []  Other:      Assessment & Plan   Assessment / Plan     Brief Patient Summary with assessment and plan:  Jessy Hope is a 27 y.o. female who presented to the emergency room with right lower quadrant abdominal pain.    1.  Right lower quadrant abdominal pain: The patient has right lower quadrant abdominal pain that has been present for at least several days, approaching 1 week.  She has tenderness in the right lower quadrant in the region of McBurney's point but the exam is overall benign with no  guarding or rebound.  Her white blood cell count is normal with a normal differential.  Clinically appendicitis appears unlikely but it is not impossible, especially if the appendix is in a retrocecal position.  Based on the persistence of her symptoms imaging is appropriate.  If there is concern that she may be early in her pregnancy, an MRI of the abdomen is safer.  I will await the results of the MRI.        Lamonte Emery Jr., MD

## 2022-12-18 NOTE — PLAN OF CARE
Goal Outcome Evaluation:  Plan of Care Reviewed With: patient   Pt has had a negative MRI. Seen by Dr Emery (surgery). Pt to be discharged home with appropriate follow up. Pt has been able to keep solid foods down. Pt alert and orient times 4, NAD, up ad gavin, RSR on the monitor and VSS. Pt given discharge instructions and all questions answered.      Progress: improving

## 2022-12-18 NOTE — PLAN OF CARE
Goal Outcome Evaluation:  Plan of Care Reviewed With: patient        Progress: no change  Outcome Evaluation: Patient is alert and oriented. Patient admitted to observation for abdominal pain. Patient is to have an MRI. Will continue to monitor.

## 2022-12-18 NOTE — PROGRESS NOTES
The MRI shows no evidence of acute appendicitis.  I started a clear liquid diet will be advanced as tolerated.

## 2022-12-18 NOTE — PLAN OF CARE
Goal Outcome Evaluation:  Plan of Care Reviewed With: patient        Progress: no change  Outcome Evaluation: A/O, medicated for pain X2 with relief, IVF infusing, NPO since MN, surgery consult pending, VSS, will continue to monitor.

## 2022-12-18 NOTE — PROGRESS NOTES
ED OBSERVATION PROGRESS/DISCHARGE SUMMARY    Date of Admission: 12/17/2022   LOS: 0 days   PCP: System, Provider Not In      Subjective    No acute events overnight.  Patient's pain has improved and she is tolerating diet.    Hospital Outcome:   27-year-old female admitted to the observation unit for further evaluation of right lower abdominal pain for 1 week.  Patient reported home pregnancy test that was faintly positive.  In the emergency department laboratory evaluation included an hCG's 6.  OB was consulted by the ER provider who recommended further evaluation with CT for suspicion of possible early appendicitis.  Patient was not comfortable and an MRI of the abdomen was ordered for further evaluation.  However upon discussion with the radiologist noted MRI of the abdomen is not available for pregnant patients at our hospital.  General surgery consulted and requested we do in fact obtain MRI of abdomen.    MRI abdomen obtained.  Risks versus benefits discussed with patient and she elected to proceed.  MRI shows no evidence of acute appendicitis.  Patient is tolerating diet.  She will be discharged home.  Advised patient we cannot definitively say if she has an early pregnancy or not.  Patient is due to start her period soon.  I recommended repeat pregnancy test in a week if she has not started her period.    ROS:  General: no fevers, chills  Respiratory: no cough, dyspnea  Cardiovascular: no chest pain, palpitations  Abdomen: No abdominal pain, nausea, vomiting, or diarrhea  Neurologic: No focal weakness    Objective   Physical Exam:  I have reviewed the vital signs.  Temp:  [98.3 °F (36.8 °C)-98.8 °F (37.1 °C)] 98.3 °F (36.8 °C)  Heart Rate:  [] 89  Resp:  [16-18] 18  BP: (121-136)/(66-90) 121/81  General Appearance:    Alert, cooperative, no distress  Head:    Normocephalic, atraumatic  Eyes:    Sclerae anicteric  Neck:   Supple, no mass  Lungs: Clear to auscultation bilaterally, respirations  unlabored  Heart: Regular rate and rhythm, S1 and S2 normal, no murmur, rub or gallop  Abdomen:  Soft, non-tender, bowel sounds active, nondistended  Extremities: No clubbing, cyanosis, or edema to lower extremities  Pulses:  2+ and symmetric in distal lower extremities  Skin: No rashes   Neurologic: Oriented x3, Normal strength to extremities    Results Review:    I have reviewed the labs, radiology results and diagnostic studies.    Results from last 7 days   Lab Units 12/17/22  1432   WBC 10*3/mm3 4.83   HEMOGLOBIN g/dL 12.6   HEMATOCRIT % 38.3   PLATELETS 10*3/mm3 315     Results from last 7 days   Lab Units 12/17/22  1432   SODIUM mmol/L 137   POTASSIUM mmol/L 3.7   CHLORIDE mmol/L 103   CO2 mmol/L 24.0   BUN mg/dL 10   CREATININE mg/dL 0.70   CALCIUM mg/dL 9.2   BILIRUBIN mg/dL 0.4   ALK PHOS U/L 56   ALT (SGPT) U/L 61*   AST (SGOT) U/L 29   GLUCOSE mg/dL 81     Imaging Results (Last 24 Hours)     Procedure Component Value Units Date/Time    MRI Abdomen Without Contrast [[id number]] Collected: 12/18/22 1256     Updated: 12/18/22 1307    Narrative:      MRI ABDOMEN WO CONTRAST-     INDICATIONS: Right-sided pain, possible appendicitis.     TECHNIQUE: Noncontrast MRI of the abdomen. Patient's provider indicated  prior to the procedure the the  potential benefits of the procedure  outweighed the potential risks.     COMPARISON: None available     FINDINGS:     No inflammatory changes are seen in the expected location of the  appendix. The appendix is not definitely identified, limiting the  assessment, but a structure thought likely to represent the appendix,  for example axial image 23, DICOM series 10, shows a normal caliber for  appendix, 5-6 mm.     The gallbladder is partly included with no stones or pericholecystic  fluid demonstrated.     No hydronephrosis is demonstrated.     The pancreas is partly included, with no inflammatory changes noted  around the pancreas.     A 1 cm left adnexal follicle or cyst is  present.     Small pelvic free fluid is noted, nonspecific.       Impression:         No appendicitis was identified, as described. Small nonspecific pelvic  free fluid. Follow-up as indications persist.     This report was finalized on 12/18/2022 1:04 PM by Dr. Rupetr Murphy M.D.             I have reviewed the medications.  ---------------------------------------------------------------------------------------------  Assessment & Plan   Assessment/Problem List    Abdominal pain      Plan:       Right lower quadrant abdominal pain  -Patient with 2 faint positive pregnancy test, quantitative hCG in ED is 6.42  -OB consulted by ER provider, noting HCcg can be seen as negative and stated okay to move forward with CT scan  -Patient did not feel comfortable with a CT scan therefore an MRI of the abdomen was ordered however after speaking with radiology, MRI of the abdomen is not available for pregnant patients here at the [facility]  -General surgery consulted, they have requested MRI abdomen    -MRI abdomen shows no evidence of acute appendicitis  -Discharge home         Disposition: Home    Follow-up after Discharge: PCP, OB/GYN    This note will serve as discharge summary    Amy Marcano PA-C 12/18/22 04:04 EST

## 2023-01-17 ENCOUNTER — OFFICE VISIT (OUTPATIENT)
Dept: OBSTETRICS AND GYNECOLOGY | Age: 28
End: 2023-01-17
Payer: COMMERCIAL

## 2023-01-17 VITALS
DIASTOLIC BLOOD PRESSURE: 82 MMHG | SYSTOLIC BLOOD PRESSURE: 120 MMHG | WEIGHT: 251.2 LBS | BODY MASS INDEX: 37.2 KG/M2 | HEIGHT: 69 IN

## 2023-01-17 DIAGNOSIS — Z20.2 EXPOSURE TO STD: ICD-10-CM

## 2023-01-17 DIAGNOSIS — Z3A.08 8 WEEKS GESTATION OF PREGNANCY: Primary | ICD-10-CM

## 2023-01-17 DIAGNOSIS — R63.8 INCREASED BMI: ICD-10-CM

## 2023-01-17 DIAGNOSIS — Z82.79 FAMILY HISTORY OF TRISOMY 18: ICD-10-CM

## 2023-01-17 DIAGNOSIS — Z13.29 SCREENING FOR THYROID DISORDER: ICD-10-CM

## 2023-01-17 DIAGNOSIS — O36.8990 UMBILICAL CORD CYST DURING PREGNANCY, ANTEPARTUM: ICD-10-CM

## 2023-01-17 PROBLEM — O41.8X10 SUBCHORIONIC HEMORRHAGE IN FIRST TRIMESTER: Status: ACTIVE | Noted: 2023-01-17

## 2023-01-17 PROBLEM — O46.8X1 SUBCHORIONIC HEMORRHAGE IN FIRST TRIMESTER: Status: ACTIVE | Noted: 2023-01-17

## 2023-01-17 PROCEDURE — 99213 OFFICE O/P EST LOW 20 MIN: CPT | Performed by: OBSTETRICS & GYNECOLOGY

## 2023-01-17 PROCEDURE — 99385 PREV VISIT NEW AGE 18-39: CPT | Performed by: OBSTETRICS & GYNECOLOGY

## 2023-01-17 PROCEDURE — 2014F MENTAL STATUS ASSESS: CPT | Performed by: OBSTETRICS & GYNECOLOGY

## 2023-01-17 RX ORDER — DOXYLAMINE SUCCINATE AND PYRIDOXINE HYDROCHLORIDE, DELAYED RELEASE TABLETS 10 MG/10 MG 10; 10 MG/1; MG/1
2 TABLET, DELAYED RELEASE ORAL NIGHTLY PRN
Qty: 60 TABLET | Refills: 1 | Status: SHIPPED | OUTPATIENT
Start: 2023-01-17 | End: 2023-03-29

## 2023-01-17 RX ORDER — CHOLECALCIFEROL (VITAMIN D3) 25 MCG
1 TABLET,CHEWABLE ORAL DAILY
Qty: 30 CAPSULE | Refills: 11 | Status: SHIPPED | OUTPATIENT
Start: 2023-01-17

## 2023-01-17 NOTE — PROGRESS NOTES
.  Subjective     Chief Complaint   Patient presents with   • Gynecologic Exam     New GYN, Pregnancy Confirmation, LMP 2022, U/S today, Pt C/O severe nausea and vomiting and fatigue        History of Present Illness    Jessy Hope is a 27 y.o.  who presents for pregnancy u/s and is also due for annual exam.  She denies any bleeding since her LMP. She has had some episodes of abd pain that resolved. She complains of constant nausea but reports she is not vomiting. She has fatigue as well.     She is  and works in housekeeping at [employer]. Her spouse is a letitia technician and works for [employer].   Obstetric History:  OB History        3    Para   2    Term   1       1    AB        Living   1       SAB        IAB        Ectopic        Molar        Multiple        Live Births   1               Menstrual History:     Patient's last menstrual period was 2022.         Current contraception: none  History of abnormal Pap smear: yes, neg f/u  Received Gardasil immunization: yes  Perform regular self breast exam: yes  Family history of uterine or ovarian cancer: no  Family History of colon cancer: no  Family history of breast cancer: PGM-after menopause    Mammogram: not indicated.  Colonoscopy: not indicated.  DEXA: not indicated.    Exercise: moderately active  Calcium/Vitamin D: adequate intake    The following portions of the patient's history were reviewed and updated as appropriate: allergies, current medications, past family history, past medical history, past social history, past surgical history and problem list.    Review of Systems    Review of Systems   Constitutional: Negative for fatigue.   Respiratory: Negative for shortness of breath.    Gastrointestinal: Negative for abdominal pain.   Genitourinary: Negative for abnormal bleeding  Neurological: Negative for headaches.   Psychiatric/Behavioral: Negative for dysphoric mood.         Objective   Physical  "Exam    /82   Ht 175.3 cm (69\")   Wt 114 kg (251 lb 3.2 oz)   LMP 11/19/2022   BMI 37.10 kg/m²   General:   Alert, in no distress   Heart: regular rate and rhythm   Lungs: clear to auscultation bilaterally   Breast: Inspection negative; no masses, retractions, nipple discharge or axillary adenopathy in either breast   Neck: Supple, no thyromegaly   Abdomen: Soft, no tenderness or guarding   Pelvis: External genitalia: normal general appearance  Urinary system: urethral meatus normal  Vaginal: normal mucosa without prolapse or lesions  Cervix: normal appearance  Adnexa: no masses or tenderness  Uterus: 8 weeks size, nontender   Extremities: Normal without edema   Neurologic: Alert and oriented   Psychiatric: Normal affect, judgment and mood     Assessment & Plan   Diagnoses and all orders for this visit:    1. 8 weeks gestation of pregnancy (Primary)  -     OB Panel With HIV  -     Urine Culture - Urine, Urine, Clean Catch  -     IGP, Rfx Aptima HPV ASCU    2. Screening for thyroid disorder  -     TSH    3. Exposure to STD  -     Chlamydia trachomatis, Neisseria gonorrhoeae, Trichomonas vaginalis, PCR - Swab, Urine, Clean Catch    4. Umbilical cord cyst during pregnancy, antepartum    5. Family history of trisomy 18    6. Increased BMI    Other orders  -     Prenatal Vit-Fe Sulfate-FA-DHA (Prenatal Vitamin/Min +DHA) 27-0.8-200 MG capsule; Take 1 tablet by mouth Daily.  Dispense: 30 capsule; Refill: 11  -     doxylamine-pyridoxine (DICLEGIS) 10-10 MG tablet delayed-release EC tablet; Take 2 tablets by mouth At Night As Needed (nausea and vomiting).  Dispense: 60 tablet; Refill: 1        All questions answered.  Breast self exam technique reviewed and patient encouraged to perform self-exam monthly.  Discussed healthy lifestyle modifications.  Recommended 30 minutes of aerobic exercise five times per week.    Reviewed u/s findings. Discussed umbilical cord cyst. Plan f/u u/s with next visit 3 weeks    Hx " trisomy 18: pt notes she was dx due to abnormal u/s findings. She delivered following FDIU. Plan aneuploidy screening at f/u visit    Reviewed pnguidelines and activity restrictions for pregnancy. Recommend she start PNV with DHA and orders sent. Discussed group coverage and OTC med list. Pt is not vomiting but reports constant nausea. Sent rx diclegis as needed and instructions reviewed. Advised pt to call if nausea worsens or regular vomiting noted.

## 2023-01-18 LAB
ABO GROUP BLD: NORMAL
BASOPHILS # BLD AUTO: 0 X10E3/UL (ref 0–0.2)
BASOPHILS NFR BLD AUTO: 0 %
BLD GP AB SCN SERPL QL: NEGATIVE
EOSINOPHIL # BLD AUTO: 0.1 X10E3/UL (ref 0–0.4)
EOSINOPHIL NFR BLD AUTO: 1 %
ERYTHROCYTE [DISTWIDTH] IN BLOOD BY AUTOMATED COUNT: 12.6 % (ref 11.7–15.4)
HBV SURFACE AG SERPL QL IA: NEGATIVE
HCT VFR BLD AUTO: 36.9 % (ref 34–46.6)
HCV AB S/CO SERPL IA: <0.1 S/CO RATIO (ref 0–0.9)
HGB BLD-MCNC: 12.4 G/DL (ref 11.1–15.9)
HIV 1+2 AB+HIV1 P24 AG SERPL QL IA: NON REACTIVE
IMM GRANULOCYTES # BLD AUTO: 0 X10E3/UL (ref 0–0.1)
IMM GRANULOCYTES NFR BLD AUTO: 0 %
LYMPHOCYTES # BLD AUTO: 1.7 X10E3/UL (ref 0.7–3.1)
LYMPHOCYTES NFR BLD AUTO: 21 %
MCH RBC QN AUTO: 28.8 PG (ref 26.6–33)
MCHC RBC AUTO-ENTMCNC: 33.6 G/DL (ref 31.5–35.7)
MCV RBC AUTO: 86 FL (ref 79–97)
MONOCYTES # BLD AUTO: 0.5 X10E3/UL (ref 0.1–0.9)
MONOCYTES NFR BLD AUTO: 6 %
NEUTROPHILS # BLD AUTO: 5.9 X10E3/UL (ref 1.4–7)
NEUTROPHILS NFR BLD AUTO: 72 %
PLATELET # BLD AUTO: 377 X10E3/UL (ref 150–450)
RBC # BLD AUTO: 4.31 X10E6/UL (ref 3.77–5.28)
RH BLD: POSITIVE
RPR SER QL: NON REACTIVE
RUBV IGG SERPL IA-ACNC: 2.01 INDEX
TSH SERPL DL<=0.005 MIU/L-ACNC: 1.08 UIU/ML (ref 0.45–4.5)
WBC # BLD AUTO: 8.3 X10E3/UL (ref 3.4–10.8)

## 2023-01-19 ENCOUNTER — HOSPITAL ENCOUNTER (EMERGENCY)
Facility: HOSPITAL | Age: 28
Discharge: HOME | End: 2023-01-19
Attending: EMERGENCY MEDICINE | Admitting: EMERGENCY MEDICINE
Payer: COMMERCIAL

## 2023-01-19 VITALS
SYSTOLIC BLOOD PRESSURE: 109 MMHG | OXYGEN SATURATION: 100 % | BODY MASS INDEX: 37.22 KG/M2 | DIASTOLIC BLOOD PRESSURE: 67 MMHG | HEIGHT: 69 IN | RESPIRATION RATE: 20 BRPM | WEIGHT: 251.3 LBS | TEMPERATURE: 98.7 F | HEART RATE: 96 BPM

## 2023-01-19 DIAGNOSIS — R11.2 NAUSEA AND VOMITING, UNSPECIFIED VOMITING TYPE: ICD-10-CM

## 2023-01-19 DIAGNOSIS — U07.1 COVID-19: Primary | ICD-10-CM

## 2023-01-19 DIAGNOSIS — Z3A.08 8 WEEKS GESTATION OF PREGNANCY: ICD-10-CM

## 2023-01-19 LAB
ALBUMIN SERPL-MCNC: 4.9 G/DL (ref 3.5–5.2)
ALBUMIN/GLOB SERPL: 1.8 G/DL
ALP SERPL-CCNC: 51 U/L (ref 39–117)
ALT SERPL W P-5'-P-CCNC: 34 U/L (ref 1–33)
ANION GAP SERPL CALCULATED.3IONS-SCNC: 11 MMOL/L (ref 5–15)
AST SERPL-CCNC: 18 U/L (ref 1–32)
BASOPHILS # BLD AUTO: 0.01 10*3/MM3 (ref 0–0.2)
BASOPHILS NFR BLD AUTO: 0.2 % (ref 0–1.5)
BILIRUB SERPL-MCNC: 0.4 MG/DL (ref 0–1.2)
BUN SERPL-MCNC: 8 MG/DL (ref 6–20)
BUN/CREAT SERPL: 13.1 (ref 7–25)
C TRACH RRNA SPEC QL NAA+PROBE: NEGATIVE
CALCIUM SPEC-SCNC: 9.4 MG/DL (ref 8.6–10.5)
CHLORIDE SERPL-SCNC: 101 MMOL/L (ref 98–107)
CO2 SERPL-SCNC: 23 MMOL/L (ref 22–29)
CREAT SERPL-MCNC: 0.61 MG/DL (ref 0.57–1)
DEPRECATED RDW RBC AUTO: 39.1 FL (ref 37–54)
EGFRCR SERPLBLD CKD-EPI 2021: 126.6 ML/MIN/1.73
EOSINOPHIL # BLD AUTO: 0.03 10*3/MM3 (ref 0–0.4)
EOSINOPHIL NFR BLD AUTO: 0.5 % (ref 0.3–6.2)
ERYTHROCYTE [DISTWIDTH] IN BLOOD BY AUTOMATED COUNT: 12.4 % (ref 12.3–15.4)
FLUAV SUBTYP SPEC NAA+PROBE: NOT DETECTED
FLUBV RNA ISLT QL NAA+PROBE: NOT DETECTED
GLOBULIN UR ELPH-MCNC: 2.8 GM/DL
GLUCOSE SERPL-MCNC: 100 MG/DL (ref 65–99)
HCG SERPL QL: POSITIVE
HCT VFR BLD AUTO: 38.5 % (ref 34–46.6)
HGB BLD-MCNC: 12.5 G/DL (ref 12–15.9)
IMM GRANULOCYTES # BLD AUTO: 0.02 10*3/MM3 (ref 0–0.05)
IMM GRANULOCYTES NFR BLD AUTO: 0.3 % (ref 0–0.5)
LYMPHOCYTES # BLD AUTO: 0.36 10*3/MM3 (ref 0.7–3.1)
LYMPHOCYTES NFR BLD AUTO: 6.1 % (ref 19.6–45.3)
MCH RBC QN AUTO: 28.2 PG (ref 26.6–33)
MCHC RBC AUTO-ENTMCNC: 32.5 G/DL (ref 31.5–35.7)
MCV RBC AUTO: 86.7 FL (ref 79–97)
MONOCYTES # BLD AUTO: 0.46 10*3/MM3 (ref 0.1–0.9)
MONOCYTES NFR BLD AUTO: 7.8 % (ref 5–12)
N GONORRHOEA RRNA SPEC QL NAA+PROBE: NEGATIVE
NEUTROPHILS NFR BLD AUTO: 4.99 10*3/MM3 (ref 1.7–7)
NEUTROPHILS NFR BLD AUTO: 85.1 % (ref 42.7–76)
NRBC BLD AUTO-RTO: 0 /100 WBC (ref 0–0.2)
PLATELET # BLD AUTO: 337 10*3/MM3 (ref 140–450)
PMV BLD AUTO: 8.4 FL (ref 6–12)
POTASSIUM SERPL-SCNC: 4 MMOL/L (ref 3.5–5.2)
PROT SERPL-MCNC: 7.7 G/DL (ref 6–8.5)
RBC # BLD AUTO: 4.44 10*6/MM3 (ref 3.77–5.28)
SARS-COV-2 RNA PNL SPEC NAA+PROBE: DETECTED
SODIUM SERPL-SCNC: 135 MMOL/L (ref 136–145)
T VAGINALIS RRNA SPEC QL NAA+PROBE: NEGATIVE
WBC NRBC COR # BLD: 5.87 10*3/MM3 (ref 3.4–10.8)

## 2023-01-19 PROCEDURE — 87636 SARSCOV2 & INF A&B AMP PRB: CPT | Performed by: EMERGENCY MEDICINE

## 2023-01-19 PROCEDURE — 99284 EMERGENCY DEPT VISIT MOD MDM: CPT

## 2023-01-19 PROCEDURE — 84703 CHORIONIC GONADOTROPIN ASSAY: CPT | Performed by: EMERGENCY MEDICINE

## 2023-01-19 PROCEDURE — 25010000002 METOCLOPRAMIDE PER 10 MG: Performed by: EMERGENCY MEDICINE

## 2023-01-19 PROCEDURE — 96374 THER/PROPH/DIAG INJ IV PUSH: CPT

## 2023-01-19 PROCEDURE — 85025 COMPLETE CBC W/AUTO DIFF WBC: CPT | Performed by: EMERGENCY MEDICINE

## 2023-01-19 PROCEDURE — 80053 COMPREHEN METABOLIC PANEL: CPT | Performed by: EMERGENCY MEDICINE

## 2023-01-19 RX ORDER — METOCLOPRAMIDE 5 MG/1
5 TABLET ORAL 3 TIMES DAILY PRN
Qty: 15 TABLET | Refills: 0 | Status: SHIPPED | OUTPATIENT
Start: 2023-01-19 | End: 2023-01-24

## 2023-01-19 RX ORDER — METOCLOPRAMIDE HYDROCHLORIDE 5 MG/ML
10 INJECTION INTRAMUSCULAR; INTRAVENOUS ONCE
Status: COMPLETED | OUTPATIENT
Start: 2023-01-19 | End: 2023-01-19

## 2023-01-19 RX ORDER — ACETAMINOPHEN 500 MG
1000 TABLET ORAL ONCE
Status: COMPLETED | OUTPATIENT
Start: 2023-01-19 | End: 2023-01-19

## 2023-01-19 RX ADMIN — SODIUM CHLORIDE, POTASSIUM CHLORIDE, SODIUM LACTATE AND CALCIUM CHLORIDE 1000 ML: 600; 310; 30; 20 INJECTION, SOLUTION INTRAVENOUS at 09:40

## 2023-01-19 RX ADMIN — ACETAMINOPHEN 1000 MG: 500 TABLET, FILM COATED ORAL at 09:26

## 2023-01-19 RX ADMIN — METOCLOPRAMIDE 10 MG: 5 INJECTION, SOLUTION INTRAMUSCULAR; INTRAVENOUS at 09:41

## 2023-01-19 NOTE — ED TRIAGE NOTES
Patient to ER via car from home for flu like symptoms x yesterday  Denies any known exposures  Patient wearing mask this RN in PPE

## 2023-01-19 NOTE — ED PROVIDER NOTES
EMERGENCY DEPARTMENT ENCOUNTER    Room Number:  12/12  Date seen:  1/19/2023  PCP: System, Provider Not In      HPI:  Chief Complaint: Flulike symptoms  A complete HPI/ROS/PMH/PSH/SH/FH are unobtainable due to: None  Context: Jessy Hope is a 27 y.o. female who presents to the ED c/o flulike symptoms.  Patient is 8 weeks pregnant.  She reports that yesterday she developed nausea and vomiting, lightheadedness, fever, dry cough, muscle aches.  She denies having a sore throat.        PAST MEDICAL HISTORY  Active Ambulatory Problems     Diagnosis Date Noted   • Abdominal pain 12/17/2022   • Umbilical cord cyst during pregnancy, antepartum 01/17/2023   • Subchorionic hemorrhage in first trimester 01/17/2023   • Family history of trisomy 18 01/17/2023   • Increased BMI 01/17/2023     Resolved Ambulatory Problems     Diagnosis Date Noted   • No Resolved Ambulatory Problems     Past Medical History:   Diagnosis Date   • Asthma          PAST SURGICAL HISTORY  History reviewed. No pertinent surgical history.      FAMILY HISTORY  Family History   Problem Relation Age of Onset   • Diabetes Mother    • Hypertension Mother    • Diabetes Father    • Hypertension Father    • Diabetes Brother    • Leukemia Brother    • Breast cancer Paternal Grandmother    • Ovarian cancer Neg Hx    • Uterine cancer Neg Hx    • Colon cancer Neg Hx          SOCIAL HISTORY  Social History     Socioeconomic History   • Marital status: Single   Tobacco Use   • Smoking status: Never   • Smokeless tobacco: Never   Vaping Use   • Vaping Use: Never used   Substance and Sexual Activity   • Alcohol use: Not Currently   • Drug use: Never   • Sexual activity: Yes     Partners: Male     Birth control/protection: None         ALLERGIES  Morphine        REVIEW OF SYSTEMS  Review of Systems     All systems reviewed and negative except for those discussed in HPI.       PHYSICAL EXAM  ED Triage Vitals   Temp Heart Rate Resp BP SpO2   01/19/23 0906 01/19/23 0906  01/19/23 0906 01/19/23 0914 01/19/23 0906   (!) 101.2 °F (38.4 °C) (!) 139 20 133/82 100 %      Temp src Heart Rate Source Patient Position BP Location FiO2 (%)   -- -- -- -- --              Physical Exam      GENERAL: no acute distress  HENT: nares patent, mild oropharyngeal erythema, no exudates  EYES: no scleral icterus  CV: regular rhythm, tachycardic  RESPIRATORY: normal effort, clear to auscultation bilaterally  ABDOMEN: soft, obese abdomen, nontender  MUSCULOSKELETAL: no deformity  NEURO: alert, moves all extremities, follows commands  PSYCH:  calm, cooperative  SKIN: warm, dry    Vital signs and nursing notes reviewed.          LAB RESULTS  Recent Results (from the past 24 hour(s))   COVID-19 and FLU A/B PCR - Swab, Nasopharynx    Collection Time: 01/19/23  9:28 AM    Specimen: Nasopharynx; Swab   Result Value Ref Range    COVID19 Detected (C) Not Detected - Ref. Range    Influenza A PCR Not Detected Not Detected    Influenza B PCR Not Detected Not Detected   hCG, Serum, Qualitative    Collection Time: 01/19/23  9:40 AM    Specimen: Blood   Result Value Ref Range    HCG Qualitative Positive (A) Negative   Comprehensive Metabolic Panel    Collection Time: 01/19/23  9:40 AM    Specimen: Blood   Result Value Ref Range    Glucose 100 (H) 65 - 99 mg/dL    BUN 8 6 - 20 mg/dL    Creatinine 0.61 0.57 - 1.00 mg/dL    Sodium 135 (L) 136 - 145 mmol/L    Potassium 4.0 3.5 - 5.2 mmol/L    Chloride 101 98 - 107 mmol/L    CO2 23.0 22.0 - 29.0 mmol/L    Calcium 9.4 8.6 - 10.5 mg/dL    Total Protein 7.7 6.0 - 8.5 g/dL    Albumin 4.9 3.5 - 5.2 g/dL    ALT (SGPT) 34 (H) 1 - 33 U/L    AST (SGOT) 18 1 - 32 U/L    Alkaline Phosphatase 51 39 - 117 U/L    Total Bilirubin 0.4 0.0 - 1.2 mg/dL    Globulin 2.8 gm/dL    A/G Ratio 1.8 g/dL    BUN/Creatinine Ratio 13.1 7.0 - 25.0    Anion Gap 11.0 5.0 - 15.0 mmol/L    eGFR 126.6 >60.0 mL/min/1.73   CBC Auto Differential    Collection Time: 01/19/23  9:40 AM    Specimen: Blood   Result  Value Ref Range    WBC 5.87 3.40 - 10.80 10*3/mm3    RBC 4.44 3.77 - 5.28 10*6/mm3    Hemoglobin 12.5 12.0 - 15.9 g/dL    Hematocrit 38.5 34.0 - 46.6 %    MCV 86.7 79.0 - 97.0 fL    MCH 28.2 26.6 - 33.0 pg    MCHC 32.5 31.5 - 35.7 g/dL    RDW 12.4 12.3 - 15.4 %    RDW-SD 39.1 37.0 - 54.0 fl    MPV 8.4 6.0 - 12.0 fL    Platelets 337 140 - 450 10*3/mm3    Neutrophil % 85.1 (H) 42.7 - 76.0 %    Lymphocyte % 6.1 (L) 19.6 - 45.3 %    Monocyte % 7.8 5.0 - 12.0 %    Eosinophil % 0.5 0.3 - 6.2 %    Basophil % 0.2 0.0 - 1.5 %    Immature Grans % 0.3 0.0 - 0.5 %    Neutrophils, Absolute 4.99 1.70 - 7.00 10*3/mm3    Lymphocytes, Absolute 0.36 (L) 0.70 - 3.10 10*3/mm3    Monocytes, Absolute 0.46 0.10 - 0.90 10*3/mm3    Eosinophils, Absolute 0.03 0.00 - 0.40 10*3/mm3    Basophils, Absolute 0.01 0.00 - 0.20 10*3/mm3    Immature Grans, Absolute 0.02 0.00 - 0.05 10*3/mm3    nRBC 0.0 0.0 - 0.2 /100 WBC       Ordered the above labs and reviewed the results.        RADIOLOGY  No Radiology Exams Resulted Within Past 24 Hours    Ordered the above noted radiological studies. Reviewed by me in PACS.          PROCEDURES  Procedures        MEDICATIONS GIVEN IN ER  Medications   acetaminophen (TYLENOL) tablet 1,000 mg (1,000 mg Oral Given 1/19/23 0916)   lactated ringers bolus 1,000 mL ( Intravenous Currently Infusing 1/19/23 1034)   metoclopramide (REGLAN) injection 10 mg (10 mg Intravenous Given 1/19/23 0971)           MEDICAL DECISION MAKING, PROGRESS, and CONSULTS    All labs have been independently reviewed by me.  All radiology studies have been reviewed by me and discussed with radiologist dictating the report.   EKG's independently viewed and interpreted by me.  Discussion below represents my analysis of pertinent findings related to patient's condition, differential diagnosis, treatment plan and final disposition.      Orders placed during this visit:  Orders Placed This Encounter   Procedures   • COVID-19 and FLU A/B PCR - Swab,  Nasopharynx   • hCG, Serum, Qualitative   • Comprehensive Metabolic Panel   • CBC Auto Differential   • Monitor Blood Pressure   • CBC & Differential               Independent interpretation of labs, radiology studies, and discussions with consultants:  ED Course as of 01/19/23 1048   Thu Jan 19, 2023   0919 Heart Rate: 102 [TD]   0919 Temp(!): 101.2 °F (38.4 °C) [TD]   0919 BP: 133/82 [TD]   0923 On medical chart review, patient was seen 2 days ago by Dr. Marie, OB for her 8-week OB appointment. [TD]   1001 WBC: 5.87 [TD]   1001 Hemoglobin: 12.5 [TD]   1039 COVID19(!!): Detected [TD]      ED Course User Index  [TD] Gregorio Dove II, MD       Patient's lungs are clear.  She is in no respiratory distress.  She is satting 100% on room air.  I do not think that she needs a chest x-ray at this time.  I did consider getting a chest x-ray, however I believe the risk of radiation in the pregnant woman is not worth it at this time.    I offered the patient Paxlovid.  We discussed risks and benefits.  She declines this as she is pregnant and does not want to take on the risk.  This is clinically reasonable.    I discharged the patient home with Reglan as she states that her insurance does not approve Diclegis.  I also discussed that she could purchase Diclegis therapy over-the-counter purchasing separate ingredients.           PPE: The patient wore a mask throughout the entire encounter. I wore a well-fitting mask.    DIAGNOSIS  Final diagnoses:   COVID-19   8 weeks gestation of pregnancy   Nausea and vomiting, unspecified vomiting type         DISPOSITION  DISCHARGE    FOLLOW-UP  Liv Marie MD  [address] [facility] [address] [phone]    Schedule an appointment as soon as possible for a visit in 1 week           Medication List      New Prescriptions    metoclopramide 5 MG tablet  Commonly known as: REGLAN  Take 1 tablet by mouth 3 (Three) Times a Day As Needed (nausea or vomiting) for up to  5 days.           Where to Get Your Medications      These medications were sent to [id number] - [place], [place] - [address] AT [address] [place] [address] - [phone]  - [phone]   [address], [facility] [address]    Phone: [phone]   · metoclopramide 5 MG tablet             Latest Documented Vital Signs:  As of 10:48 EST  BP- 111/71 HR- 96 Temp- 98.7 °F (37.1 °C) (Oral) O2 sat- 100%      --    Please note that portions of this were completed with a voice recognition program.       Note Disclaimer: At [facility], we believe that sharing information builds trust and better relationships. You are receiving this note because you are receiving care at [facility] or recently visited. It is possible you will see health information before a provider has talked with you about it. This kind of information can be easy to misunderstand. To help you fully understand what it means for your health, we urge you to discuss this note with your provider.       Gregorio Dove II, MD  01/19/23 6920

## 2023-01-21 LAB
BACTERIA UR CULT: ABNORMAL
CONV .: ABNORMAL
CYTOLOGIST CVX/VAG CYTO: ABNORMAL
CYTOLOGY CVX/VAG DOC CYTO: ABNORMAL
CYTOLOGY CVX/VAG DOC THIN PREP: ABNORMAL
DX ICD CODE: ABNORMAL
DX ICD CODE: ABNORMAL
HIV 1 & 2 AB SER-IMP: ABNORMAL
HPV I/H RISK 4 DNA CVX QL PROBE+SIG AMP: POSITIVE
OTHER ANTIBIOTIC SUSC ISLT: ABNORMAL
OTHER STN SPEC: ABNORMAL
PATHOLOGIST CVX/VAG CYTO: ABNORMAL
RECOM F/U CVX/VAG CYTO: ABNORMAL
STAT OF ADQ CVX/VAG CYTO-IMP: ABNORMAL

## 2023-01-23 ENCOUNTER — TELEPHONE (OUTPATIENT)
Dept: OBSTETRICS AND GYNECOLOGY | Age: 28
End: 2023-01-23
Payer: COMMERCIAL

## 2023-01-23 PROBLEM — R87.810 ASCUS WITH POSITIVE HIGH RISK HPV CERVICAL: Status: ACTIVE | Noted: 2023-01-23

## 2023-01-23 PROBLEM — R87.610 ASCUS WITH POSITIVE HIGH RISK HPV CERVICAL: Status: ACTIVE | Noted: 2023-01-23

## 2023-01-23 PROBLEM — O23.41 URINARY TRACT INFECTION IN MOTHER DURING FIRST TRIMESTER OF PREGNANCY: Status: ACTIVE | Noted: 2023-01-23

## 2023-01-23 RX ORDER — AMOXICILLIN 500 MG/1
500 CAPSULE ORAL 3 TIMES DAILY
Qty: 20 CAPSULE | Refills: 0 | Status: SHIPPED | OUTPATIENT
Start: 2023-01-23 | End: 2023-01-30

## 2023-01-23 NOTE — TELEPHONE ENCOUNTER
doxylamine-pyridoxine (DICLEGIS) 10-10 MG tablet    not covered by pt insurance please advise alternative covered.

## 2023-01-23 NOTE — TELEPHONE ENCOUNTER
Called pt and reviewed her urine culture and pap results. She denies UTI symptoms but notes she was dx with covid on 1/19 after her visit. She notes her symptoms have now resolved. She was having n/v then but denies any further symptoms.     She denies any antibiotic allergies. Sent rx for amoxicillin 500 mg 3 times daily for 7 days to pharmacy. Pt agrees to take rx. Reviewed pyelo warnings. Also discussed pap results. Advised she has abnormal pap and this will need further evaluation. She notes understanding.

## 2023-02-04 ENCOUNTER — HOSPITAL ENCOUNTER (EMERGENCY)
Facility: HOSPITAL | Age: 28
Discharge: HOME | End: 2023-02-05
Attending: EMERGENCY MEDICINE | Admitting: EMERGENCY MEDICINE
Payer: COMMERCIAL

## 2023-02-04 VITALS
WEIGHT: 250 LBS | HEIGHT: 69 IN | HEART RATE: 99 BPM | BODY MASS INDEX: 37.03 KG/M2 | TEMPERATURE: 99.1 F | SYSTOLIC BLOOD PRESSURE: 147 MMHG | DIASTOLIC BLOOD PRESSURE: 95 MMHG | OXYGEN SATURATION: 94 % | RESPIRATION RATE: 18 BRPM

## 2023-02-04 DIAGNOSIS — N39.0 ACUTE URINARY TRACT INFECTION: ICD-10-CM

## 2023-02-04 DIAGNOSIS — O20.9 VAGINAL BLEEDING IN PREGNANCY, FIRST TRIMESTER: Primary | ICD-10-CM

## 2023-02-04 PROCEDURE — 86850 RBC ANTIBODY SCREEN: CPT

## 2023-02-04 PROCEDURE — 36415 COLL VENOUS BLD VENIPUNCTURE: CPT

## 2023-02-04 PROCEDURE — 86901 BLOOD TYPING SEROLOGIC RH(D): CPT

## 2023-02-04 PROCEDURE — 86900 BLOOD TYPING SEROLOGIC ABO: CPT

## 2023-02-04 PROCEDURE — 99283 EMERGENCY DEPT VISIT LOW MDM: CPT

## 2023-02-04 RX ORDER — SODIUM CHLORIDE 0.9 % (FLUSH) 0.9 %
10 SYRINGE (ML) INJECTION AS NEEDED
Status: DISCONTINUED | OUTPATIENT
Start: 2023-02-04 | End: 2023-02-05 | Stop reason: HOSPADM

## 2023-02-05 ENCOUNTER — APPOINTMENT (OUTPATIENT)
Dept: ULTRASOUND IMAGING | Facility: HOSPITAL | Age: 28
End: 2023-02-05
Payer: COMMERCIAL

## 2023-02-05 LAB
ABO GROUP BLD: NORMAL
ANION GAP SERPL CALCULATED.3IONS-SCNC: 9 MMOL/L (ref 5–15)
BACTERIA UR QL AUTO: ABNORMAL /HPF
BASOPHILS # BLD AUTO: 0.02 10*3/MM3 (ref 0–0.2)
BASOPHILS NFR BLD AUTO: 0.2 % (ref 0–1.5)
BILIRUB UR QL STRIP: NEGATIVE
BLD GP AB SCN SERPL QL: NEGATIVE
BUN SERPL-MCNC: 11 MG/DL (ref 6–20)
BUN/CREAT SERPL: 17.5 (ref 7–25)
CALCIUM SPEC-SCNC: 9.4 MG/DL (ref 8.6–10.5)
CHLORIDE SERPL-SCNC: 104 MMOL/L (ref 98–107)
CLARITY UR: ABNORMAL
CO2 SERPL-SCNC: 23 MMOL/L (ref 22–29)
COLOR UR: YELLOW
CREAT SERPL-MCNC: 0.63 MG/DL (ref 0.57–1)
DEPRECATED RDW RBC AUTO: 37.9 FL (ref 37–54)
EGFRCR SERPLBLD CKD-EPI 2021: 124.9 ML/MIN/1.73
EOSINOPHIL # BLD AUTO: 0.11 10*3/MM3 (ref 0–0.4)
EOSINOPHIL NFR BLD AUTO: 1 % (ref 0.3–6.2)
ERYTHROCYTE [DISTWIDTH] IN BLOOD BY AUTOMATED COUNT: 12.1 % (ref 12.3–15.4)
GLUCOSE SERPL-MCNC: 108 MG/DL (ref 65–99)
GLUCOSE UR STRIP-MCNC: NEGATIVE MG/DL
HCG INTACT+B SERPL-ACNC: NORMAL MIU/ML
HCT VFR BLD AUTO: 34.4 % (ref 34–46.6)
HGB BLD-MCNC: 11.8 G/DL (ref 12–15.9)
HGB UR QL STRIP.AUTO: ABNORMAL
HOLD SPECIMEN: NORMAL
HOLD SPECIMEN: NORMAL
HYALINE CASTS UR QL AUTO: ABNORMAL /LPF
IMM GRANULOCYTES # BLD AUTO: 0.06 10*3/MM3 (ref 0–0.05)
IMM GRANULOCYTES NFR BLD AUTO: 0.6 % (ref 0–0.5)
KETONES UR QL STRIP: NEGATIVE
LEUKOCYTE ESTERASE UR QL STRIP.AUTO: ABNORMAL
LYMPHOCYTES # BLD AUTO: 2.1 10*3/MM3 (ref 0.7–3.1)
LYMPHOCYTES NFR BLD AUTO: 20 % (ref 19.6–45.3)
MCH RBC QN AUTO: 29.3 PG (ref 26.6–33)
MCHC RBC AUTO-ENTMCNC: 34.3 G/DL (ref 31.5–35.7)
MCV RBC AUTO: 85.4 FL (ref 79–97)
MONOCYTES # BLD AUTO: 0.74 10*3/MM3 (ref 0.1–0.9)
MONOCYTES NFR BLD AUTO: 7.1 % (ref 5–12)
NEUTROPHILS NFR BLD AUTO: 7.46 10*3/MM3 (ref 1.7–7)
NEUTROPHILS NFR BLD AUTO: 71.1 % (ref 42.7–76)
NITRITE UR QL STRIP: NEGATIVE
NRBC BLD AUTO-RTO: 0.1 /100 WBC (ref 0–0.2)
PH UR STRIP.AUTO: 6.5 [PH] (ref 5–8)
PLATELET # BLD AUTO: 351 10*3/MM3 (ref 140–450)
PMV BLD AUTO: 8.9 FL (ref 6–12)
POTASSIUM SERPL-SCNC: 3.5 MMOL/L (ref 3.5–5.2)
PROT UR QL STRIP: ABNORMAL
RBC # BLD AUTO: 4.03 10*6/MM3 (ref 3.77–5.28)
RBC # UR STRIP: ABNORMAL /HPF
REF LAB TEST METHOD: ABNORMAL
RH BLD: POSITIVE
SODIUM SERPL-SCNC: 136 MMOL/L (ref 136–145)
SP GR UR STRIP: 1.03 (ref 1–1.03)
SQUAMOUS #/AREA URNS HPF: ABNORMAL /HPF
T&S EXPIRATION DATE: NORMAL
UROBILINOGEN UR QL STRIP: ABNORMAL
WBC # UR STRIP: ABNORMAL /HPF
WBC NRBC COR # BLD: 10.49 10*3/MM3 (ref 3.4–10.8)
WHOLE BLOOD HOLD COAG: NORMAL
WHOLE BLOOD HOLD SPECIMEN: NORMAL

## 2023-02-05 PROCEDURE — 76817 TRANSVAGINAL US OBSTETRIC: CPT

## 2023-02-05 PROCEDURE — 80048 BASIC METABOLIC PNL TOTAL CA: CPT

## 2023-02-05 PROCEDURE — 76815 OB US LIMITED FETUS(S): CPT

## 2023-02-05 PROCEDURE — 81001 URINALYSIS AUTO W/SCOPE: CPT | Performed by: EMERGENCY MEDICINE

## 2023-02-05 PROCEDURE — 87186 SC STD MICRODIL/AGAR DIL: CPT | Performed by: EMERGENCY MEDICINE

## 2023-02-05 PROCEDURE — 87077 CULTURE AEROBIC IDENTIFY: CPT | Performed by: EMERGENCY MEDICINE

## 2023-02-05 PROCEDURE — 84702 CHORIONIC GONADOTROPIN TEST: CPT

## 2023-02-05 PROCEDURE — 93976 VASCULAR STUDY: CPT

## 2023-02-05 PROCEDURE — 85025 COMPLETE CBC W/AUTO DIFF WBC: CPT

## 2023-02-05 PROCEDURE — 87086 URINE CULTURE/COLONY COUNT: CPT | Performed by: EMERGENCY MEDICINE

## 2023-02-05 RX ORDER — CEPHALEXIN 500 MG/1
500 CAPSULE ORAL 3 TIMES DAILY
Qty: 21 CAPSULE | Refills: 0 | Status: SHIPPED | OUTPATIENT
Start: 2023-02-05 | End: 2023-02-07 | Stop reason: SDUPTHER

## 2023-02-05 NOTE — ED PROVIDER NOTES
EMERGENCY DEPARTMENT ENCOUNTER    Room Number:  22/22  Date seen:  2/5/2023  PCP: System, Provider Not In  Historian: Patient      HPI:  Chief Complaint: Vaginal bleeding, pregnant    A complete HPI/ROS/PMH/PSH/SH/FH are unobtainable due to: N/A    Context: Jessy Hope is a 28 y.o. female who presents to the ED c/o scant dark red vaginal bleeding which started a few hours ago.  She is also has some pelvic cramping all day.  Bleeding is less than a period and described as spotting when she wipes.  No vomiting or diarrhea.  She has had urinary frequency but no fevers or chills.        Additional sources:  - Discussed/ obtained information from independent historians: No    - External (non-ED) record review: Patient was seen in this emergency department on 1/19/2023 for COVID.  She follows with Dr. Marie with OB/GYN.  She is G3, P1 101.  She has a documented IUP with this pregnancy.    - Chronic or social conditions impacting care: None of which I am aware.      PAST MEDICAL HISTORY  Active Ambulatory Problems     Diagnosis Date Noted   • Abdominal pain 12/17/2022   • Umbilical cord cyst during pregnancy, antepartum 01/17/2023   • Subchorionic hemorrhage in first trimester 01/17/2023   • Family history of trisomy 18 01/17/2023   • Increased BMI 01/17/2023   • Urinary tract infection in mother during first trimester of pregnancy 01/23/2023   • ASCUS with positive high risk HPV cervical 01/23/2023     Resolved Ambulatory Problems     Diagnosis Date Noted   • No Resolved Ambulatory Problems     Past Medical History:   Diagnosis Date   • Asthma          PAST SURGICAL HISTORY  No past surgical history on file.      FAMILY HISTORY  Family History   Problem Relation Age of Onset   • Diabetes Mother    • Hypertension Mother    • Diabetes Father    • Hypertension Father    • Diabetes Brother    • Leukemia Brother    • Breast cancer Paternal Grandmother    • Ovarian cancer Neg Hx    • Uterine cancer Neg Hx    • Colon  cancer Neg Hx          SOCIAL HISTORY  Social History     Socioeconomic History   • Marital status: Single   Tobacco Use   • Smoking status: Never   • Smokeless tobacco: Never   Vaping Use   • Vaping Use: Never used   Substance and Sexual Activity   • Alcohol use: Not Currently   • Drug use: Never   • Sexual activity: Yes     Partners: Male     Birth control/protection: None         ALLERGIES  Morphine        REVIEW OF SYSTEMS  Review of Systems   Constitutional: Negative for chills and fever.   Respiratory: Negative for shortness of breath.    Cardiovascular: Negative for chest pain.   Gastrointestinal: Negative for diarrhea and vomiting.   Genitourinary: Positive for dysuria, frequency, pelvic pain and vaginal bleeding. Negative for hematuria and vaginal discharge.            PHYSICAL EXAM  ED Triage Vitals [02/04/23 2225]   Temp Heart Rate Resp BP SpO2   99.1 °F (37.3 °C) 99 18 147/95 94 %      Temp src Heart Rate Source Patient Position BP Location FiO2 (%)   Oral -- Standing Right arm --       Physical Exam      Physical Exam   Constitutional: Pt. is oriented to person, place, and time.  She is well-developed, well-nourished, and in no distress.    HENT: Normocephalic and atraumatic. Pupils are equal, round, and reactive to light.   Neck: Normal range of motion. Neck supple. No JVD present. No tracheal deviation present.   Cardiovascular: Normal rate, regular rhythm and normal heart sounds.   Pulmonary/Chest: Effort normal and breath sounds normal. No stridor. No respiratory distress. No wheezes, no rales.   Abdominal: Soft.  No distension. There is no tenderness. There is no rebound and no guarding.   Musculoskeletal: No edema, tenderness or deformity.   Neurological: She is alert and oriented to person, place, and time.  She has no focal neurologic deficits.  Skin: Skin is warm and dry. Pt. is not diaphoretic.  Psychiatric: Mood, affect normal.  She is pleasant and cooperative.  Nursing note and vitals  reviewed.            LAB RESULTS  Recent Results (from the past 24 hour(s))   Type & Screen    Collection Time: 02/04/23 11:20 PM    Specimen: Blood   Result Value Ref Range    ABO Type O     RH type Positive     Antibody Screen Negative     T&S Expiration Date 2/7/2023 11:59:59 PM    Light Blue Top    Collection Time: 02/05/23 12:01 AM   Result Value Ref Range    Extra Tube Hold for add-ons.    hCG, Quantitative, Pregnancy    Collection Time: 02/05/23 12:08 AM    Specimen: Blood   Result Value Ref Range    HCG Quantitative 72,407.00 mIU/mL   Basic Metabolic Panel    Collection Time: 02/05/23 12:08 AM    Specimen: Blood   Result Value Ref Range    Glucose 108 (H) 65 - 99 mg/dL    BUN 11 6 - 20 mg/dL    Creatinine 0.63 0.57 - 1.00 mg/dL    Sodium 136 136 - 145 mmol/L    Potassium 3.5 3.5 - 5.2 mmol/L    Chloride 104 98 - 107 mmol/L    CO2 23.0 22.0 - 29.0 mmol/L    Calcium 9.4 8.6 - 10.5 mg/dL    BUN/Creatinine Ratio 17.5 7.0 - 25.0    Anion Gap 9.0 5.0 - 15.0 mmol/L    eGFR 124.9 >60.0 mL/min/1.73   Green Top (Gel)    Collection Time: 02/05/23 12:08 AM   Result Value Ref Range    Extra Tube Hold for add-ons.    Gold Top - SST    Collection Time: 02/05/23 12:08 AM   Result Value Ref Range    Extra Tube Hold for add-ons.    Lavender Top    Collection Time: 02/05/23 12:10 AM   Result Value Ref Range    Extra Tube hold for add-on    CBC Auto Differential    Collection Time: 02/05/23 12:10 AM    Specimen: Blood   Result Value Ref Range    WBC 10.49 3.40 - 10.80 10*3/mm3    RBC 4.03 3.77 - 5.28 10*6/mm3    Hemoglobin 11.8 (L) 12.0 - 15.9 g/dL    Hematocrit 34.4 34.0 - 46.6 %    MCV 85.4 79.0 - 97.0 fL    MCH 29.3 26.6 - 33.0 pg    MCHC 34.3 31.5 - 35.7 g/dL    RDW 12.1 (L) 12.3 - 15.4 %    RDW-SD 37.9 37.0 - 54.0 fl    MPV 8.9 6.0 - 12.0 fL    Platelets 351 140 - 450 10*3/mm3    Neutrophil % 71.1 42.7 - 76.0 %    Lymphocyte % 20.0 19.6 - 45.3 %    Monocyte % 7.1 5.0 - 12.0 %    Eosinophil % 1.0 0.3 - 6.2 %    Basophil %  0.2 0.0 - 1.5 %    Immature Grans % 0.6 (H) 0.0 - 0.5 %    Neutrophils, Absolute 7.46 (H) 1.70 - 7.00 10*3/mm3    Lymphocytes, Absolute 2.10 0.70 - 3.10 10*3/mm3    Monocytes, Absolute 0.74 0.10 - 0.90 10*3/mm3    Eosinophils, Absolute 0.11 0.00 - 0.40 10*3/mm3    Basophils, Absolute 0.02 0.00 - 0.20 10*3/mm3    Immature Grans, Absolute 0.06 (H) 0.00 - 0.05 10*3/mm3    nRBC 0.1 0.0 - 0.2 /100 WBC   Urinalysis With Culture If Indicated - Urine, Clean Catch    Collection Time: 02/05/23  1:15 AM    Specimen: Urine, Clean Catch   Result Value Ref Range    Color, UA Yellow Yellow, Straw    Appearance, UA Turbid (A) Clear    pH, UA 6.5 5.0 - 8.0    Specific Gravity, UA 1.026 1.005 - 1.030    Glucose, UA Negative Negative    Ketones, UA Negative Negative    Bilirubin, UA Negative Negative    Blood, UA Moderate (2+) (A) Negative    Protein,  mg/dL (2+) (A) Negative    Leuk Esterase, UA Moderate (2+) (A) Negative    Nitrite, UA Negative Negative    Urobilinogen, UA 2.0 E.U./dL (A) 0.2 - 1.0 E.U./dL   Urinalysis, Microscopic Only - Urine, Clean Catch    Collection Time: 02/05/23  1:15 AM    Specimen: Urine, Clean Catch   Result Value Ref Range    RBC, UA Too Numerous to Count (A) None Seen, 0-2 /HPF    WBC, UA Too Numerous to Count (A) None Seen, 0-2 /HPF    Bacteria, UA 2+ (A) None Seen /HPF    Squamous Epithelial Cells, UA 3-6 (A) None Seen, 0-2 /HPF    Hyaline Casts, UA 0-2 None Seen /LPF    Methodology Automated Microscopy        Ordered the above labs and reviewed the results.        RADIOLOGY  US Ob Limited 1 + Fetuses    Result Date: 2/5/2023  PELVIC ULTRASOUND  HISTORY: Vaginal bleeding  COMPARISON: 01/17/2023  TECHNIQUE: Grayscale, color Doppler, spectral Doppler waveform analysis was performed through the pelvis transabdominally and transvaginally.  FINDINGS: Uterus measures 12.6 x 7.1 x 8.3 cm. Single living intrauterine pregnancy is identified. Fetal heart rate was found to be 164 bpm. No free fluid or  subchorionic hemorrhage is seen. Right ovary cannot be visualized. Left ovary measures 4.7 x 3.5 x 3.0 cm. Normal color Doppler flow is seen. No adnexal masses are identified.      Single living intrauterine pregnancy is identified. Gestational age by dates is 11 weeks and 2 days, for an estimated date of delivery of 08/25/2023. Estimated gestational age by measurements is 10 weeks and 6 days, for an estimated date of delivery of 08/28/2023. No subchorionic hemorrhage was seen. Continued obstetric and sonographic follow-up is recommended.  This report was finalized on 2/5/2023 1:32 AM by Dr. Maryellen Arriola M.D.        Ordered the above noted radiological studies. Reviewed by me in PACS.        PROCEDURES  Procedures      MEDICATIONS GIVEN IN ER  Medications   sodium chloride 0.9 % flush 10 mL (has no administration in time range)   lactated ringers bolus 1,000 mL (0 mL Intravenous Hold 2/5/23 0112)             MEDICAL DECISION MAKING, PROGRESS, and CONSULTS    All labs have been independently reviewed by me.  All radiology studies have been reviewed by me and discussed with radiologist dictating the report.   EKG's independently viewed and interpreted by me.  Discussion below represents my analysis of pertinent findings related to patient's condition, differential diagnosis, treatment plan and final disposition.      Orders placed during this visit:  Orders Placed This Encounter   Procedures   • Urine Culture - Urine,   • US Ob Limited 1 + Fetuses   • US Ob Transvaginal   • US Testicular or Ovarian Vascular Limited   • [place] Draw   • hCG, Quantitative, Pregnancy   • Basic Metabolic Panel   • CBC Auto Differential   • Urinalysis With Culture If Indicated - Urine, Clean Catch   • Urinalysis, Microscopic Only - Urine, Clean Catch   • NPO Diet NPO Type: Strict NPO   • Undress & Gown   • Cardiac Monitoring   • Vital Signs   • Orthostatic Blood Pressure   • Supplies To Bedside - Notify MD When Ready- Pelvic Cart /  Set Up   • Pulse Oximetry   • Oxygen Therapy- Nasal Cannula; 2 LPM; Titrate for SPO2: equal to or greater than, 92%   • Type & Screen   • Insert Peripheral IV   • CBC & Differential   • Green Top (Gel)   • Lavender Top   • Gold Top - SST   • Light Blue Top       Additional orders considered but not ordered:  N/A    Differential diagnosis:  Subchorionic hemorrhage, ectopic pregnancy, miscarriage      Independent interpretation of labs, radiology studies, and discussions with consultants:  ED Course as of 02/05/23 0156   Sun Feb 05, 2023   0035 HCG Quantitative: 72,407.00 [WC]   0110 Ultrasound shows single live viable IUP at 11 weeks with a heart rate of 165 bpm [WC]   0135 WBC, UA(!): Too Numerous to Count [WC]   0135 Bacteria, UA(!): 2+ [WC]   0135 Leukocytes, UA(!): Moderate (2+) [WC]      ED Course User Index  [WC] Yandel Zamudio MD              Appropriate PPE was worn by myself and the patient throughout our entire interaction.    DIAGNOSIS  Final diagnoses:   Vaginal bleeding in pregnancy, first trimester   Acute urinary tract infection         DISPOSITION  Discharged            Latest Documented Vital Signs:  As of 01:56 EST  BP- 147/95 HR- 99 Temp- 99.1 °F (37.3 °C) (Oral) O2 sat- 94%        --    Please note that portions of this were completed with a voice recognition program.       Note Disclaimer: At [facility], we believe that sharing information builds trust and better relationships. You are receiving this note because you are receiving care at [facility] or recently visited. It is possible you will see health information before a provider has talked with you about it. This kind of information can be easy to misunderstand. To help you fully understand what it means for your health, we urge you to discuss this note with your provider.           Yandel Zamudio MD  02/05/23 0111       Yandel Zamudio MD  02/05/23 0156     Azithromycin Counseling:  I discussed with the patient the risks of azithromycin including but not limited to GI upset, allergic reaction, drug rash, diarrhea, and yeast infections. Sarecycline Pregnancy And Lactation Text: This medication is Pregnancy Category D and not consider safe during pregnancy. It is also excreted in breast milk. Azelaic Acid Pregnancy And Lactation Text: This medication is considered safe during pregnancy and breast feeding. High Dose Vitamin A Pregnancy And Lactation Text: High dose vitamin A therapy is contraindicated during pregnancy and breast feeding. Use Enhanced Medication Counseling?: No Topical Clindamycin Counseling: Patient counseled that this medication may cause skin irritation or allergic reactions.  In the event of skin irritation, the patient was advised to reduce the amount of the drug applied or use it less frequently.   The patient verbalized understanding of the proper use and possible adverse effects of clindamycin.  All of the patient's questions and concerns were addressed. Topical Retinoid counseling:  Patient advised to apply a pea-sized amount only at bedtime and wait 30 minutes after washing their face before applying.  If too drying, patient may add a non-comedogenic moisturizer. The patient verbalized understanding of the proper use and possible adverse effects of retinoids.  All of the patient's questions and concerns were addressed. Winlevi Counseling:  I discussed with the patient the risks of topical clascoterone including but not limited to erythema, scaling, itching, and stinging. Patient voiced their understanding. Bactrim Pregnancy And Lactation Text: This medication is Pregnancy Category D and is known to cause fetal risk.  It is also excreted in breast milk. Erythromycin Pregnancy And Lactation Text: This medication is Pregnancy Category B and is considered safe during pregnancy. It is also excreted in breast milk. Dapsone Pregnancy And Lactation Text: This medication is Pregnancy Category C and is not considered safe during pregnancy or breast feeding. Azithromycin Pregnancy And Lactation Text: This medication is considered safe during pregnancy and is also secreted in breast milk. Topical Clindamycin Pregnancy And Lactation Text: This medication is Pregnancy Category B and is considered safe during pregnancy. It is unknown if it is excreted in breast milk. Minocycline Counseling: Patient advised regarding possible photosensitivity and discoloration of the teeth, skin, lips, tongue and gums.  Patient instructed to avoid sunlight, if possible.  When exposed to sunlight, patients should wear protective clothing, sunglasses, and sunscreen.  The patient was instructed to call the office immediately if the following severe adverse effects occur:  hearing changes, easy bruising/bleeding, severe headache, or vision changes.  The patient verbalized understanding of the proper use and possible adverse effects of minocycline.  All of the patient's questions and concerns were addressed. Aklief counseling:  Patient advised to apply a pea-sized amount only at bedtime and wait 30 minutes after washing their face before applying.  If too drying, patient may add a non-comedogenic moisturizer.  The most commonly reported side effects including irritation, redness, scaling, dryness, stinging, burning, itching, and increased risk of sunburn.  The patient verbalized understanding of the proper use and possible adverse effects of retinoids.  All of the patient's questions and concerns were addressed. Winlevi Pregnancy And Lactation Text: This medication is considered safe during pregnancy and breastfeeding. Spironolactone Counseling: Patient advised regarding risks of diarrhea, abdominal pain, hyperkalemia, birth defects (for female patients), liver toxicity and renal toxicity. The patient may need blood work to monitor liver and kidney function and potassium levels while on therapy. The patient verbalized understanding of the proper use and possible adverse effects of spironolactone.  All of the patient's questions and concerns were addressed. Doxycycline Counseling:  Patient counseled regarding possible photosensitivity and increased risk for sunburn.  Patient instructed to avoid sunlight, if possible.  When exposed to sunlight, patients should wear protective clothing, sunglasses, and sunscreen.  The patient was instructed to call the office immediately if the following severe adverse effects occur:  hearing changes, easy bruising/bleeding, severe headache, or vision changes.  The patient verbalized understanding of the proper use and possible adverse effects of doxycycline.  All of the patient's questions and concerns were addressed. Birth Control Pills Counseling: Birth Control Pill Counseling: I discussed with the patient the potential side effects of OCPs including but not limited to increased risk of stroke, heart attack, thrombophlebitis, deep venous thrombosis, hepatic adenomas, breast changes, GI upset, headaches, and depression.  The patient verbalized understanding of the proper use and possible adverse effects of OCPs. All of the patient's questions and concerns were addressed. Isotretinoin Counseling: Patient should get monthly blood tests, not donate blood, not drive at night if vision affected, not share medication, and not undergo elective surgery for 6 months after tx completed. Side effects reviewed, pt to contact office should one occur. Topical Retinoid Pregnancy And Lactation Text: This medication is Pregnancy Category C. It is unknown if this medication is excreted in breast milk. Spironolactone Pregnancy And Lactation Text: This medication can cause feminization of the male fetus and should be avoided during pregnancy. The active metabolite is also found in breast milk. Aklief Pregnancy And Lactation Text: It is unknown if this medication is safe to use during pregnancy.  It is unknown if this medication is excreted in breast milk.  Breastfeeding women should use the topical cream on the smallest area of the skin for the shortest time needed while breastfeeding.  Do not apply to nipple and areola. Isotretinoin Pregnancy And Lactation Text: This medication is Pregnancy Category X and is considered extremely dangerous during pregnancy. It is unknown if it is excreted in breast milk. Tazorac Counseling:  Patient advised that medication is irritating and drying.  Patient may need to apply sparingly and wash off after an hour before eventually leaving it on overnight.  The patient verbalized understanding of the proper use and possible adverse effects of tazorac.  All of the patient's questions and concerns were addressed. Topical Sulfur Applications Counseling: Topical Sulfur Counseling: Patient counseled that this medication may cause skin irritation or allergic reactions.  In the event of skin irritation, the patient was advised to reduce the amount of the drug applied or use it less frequently.   The patient verbalized understanding of the proper use and possible adverse effects of topical sulfur application.  All of the patient's questions and concerns were addressed. Birth Control Pills Pregnancy And Lactation Text: This medication should be avoided if pregnant and for the first 30 days post-partum. Benzoyl Peroxide Counseling: Patient counseled that medicine may cause skin irritation and bleach clothing.  In the event of skin irritation, the patient was advised to reduce the amount of the drug applied or use it less frequently.   The patient verbalized understanding of the proper use and possible adverse effects of benzoyl peroxide.  All of the patient's questions and concerns were addressed. Doxycycline Pregnancy And Lactation Text: This medication is Pregnancy Category D and not consider safe during pregnancy. It is also excreted in breast milk but is considered safe for shorter treatment courses. Azelaic Acid Counseling: Patient counseled that medicine may cause skin irritation and to avoid applying near the eyes.  In the event of skin irritation, the patient was advised to reduce the amount of the drug applied or use it less frequently.   The patient verbalized understanding of the proper use and possible adverse effects of azelaic acid.  All of the patient's questions and concerns were addressed. Sarecycline Counseling: Patient advised regarding possible photosensitivity and discoloration of the teeth, skin, lips, tongue and gums.  Patient instructed to avoid sunlight, if possible.  When exposed to sunlight, patients should wear protective clothing, sunglasses, and sunscreen.  The patient was instructed to call the office immediately if the following severe adverse effects occur:  hearing changes, easy bruising/bleeding, severe headache, or vision changes.  The patient verbalized understanding of the proper use and possible adverse effects of sarecycline.  All of the patient's questions and concerns were addressed. Topical Sulfur Applications Pregnancy And Lactation Text: This medication is Pregnancy Category C and has an unknown safety profile during pregnancy. It is unknown if this topical medication is excreted in breast milk. Tetracycline Counseling: Patient counseled regarding possible photosensitivity and increased risk for sunburn.  Patient instructed to avoid sunlight, if possible.  When exposed to sunlight, patients should wear protective clothing, sunglasses, and sunscreen.  The patient was instructed to call the office immediately if the following severe adverse effects occur:  hearing changes, easy bruising/bleeding, severe headache, or vision changes.  The patient verbalized understanding of the proper use and possible adverse effects of tetracycline.  All of the patient's questions and concerns were addressed. Patient understands to avoid pregnancy while on therapy due to potential birth defects. Dapsone Counseling: I discussed with the patient the risks of dapsone including but not limited to hemolytic anemia, agranulocytosis, rashes, methemoglobinemia, kidney failure, peripheral neuropathy, headaches, GI upset, and liver toxicity.  Patients who start dapsone require monitoring including baseline LFTs and weekly CBCs for the first month, then every month thereafter.  The patient verbalized understanding of the proper use and possible adverse effects of dapsone.  All of the patient's questions and concerns were addressed. High Dose Vitamin A Counseling: Side effects reviewed, pt to contact office should one occur. Erythromycin Counseling:  I discussed with the patient the risks of erythromycin including but not limited to GI upset, allergic reaction, drug rash, diarrhea, increase in liver enzymes, and yeast infections. Benzoyl Peroxide Pregnancy And Lactation Text: This medication is Pregnancy Category C. It is unknown if benzoyl peroxide is excreted in breast milk. Detail Level: Detailed Bactrim Counseling:  I discussed with the patient the risks of sulfa antibiotics including but not limited to GI upset, allergic reaction, drug rash, diarrhea, dizziness, photosensitivity, and yeast infections.  Rarely, more serious reactions can occur including but not limited to aplastic anemia, agranulocytosis, methemoglobinemia, blood dyscrasias, liver or kidney failure, lung infiltrates or desquamative/blistering drug rashes. Tazorac Pregnancy And Lactation Text: This medication is not safe during pregnancy. It is unknown if this medication is excreted in breast milk.

## 2023-02-05 NOTE — ED NOTES
Pt presents via PV for abdominal cramping and vaginal bleeding that started a couple of hours PTA. Pt reports she is 10 weeks pregnant.

## 2023-02-07 ENCOUNTER — ROUTINE PRENATAL (OUTPATIENT)
Dept: OBSTETRICS AND GYNECOLOGY | Age: 28
End: 2023-02-07
Payer: COMMERCIAL

## 2023-02-07 DIAGNOSIS — Z13.1 SCREENING FOR DIABETES MELLITUS: ICD-10-CM

## 2023-02-07 DIAGNOSIS — Z3A.11 11 WEEKS GESTATION OF PREGNANCY: Primary | ICD-10-CM

## 2023-02-07 DIAGNOSIS — O23.41 URINARY TRACT INFECTION IN MOTHER DURING FIRST TRIMESTER OF PREGNANCY: ICD-10-CM

## 2023-02-07 DIAGNOSIS — Z86.32 HISTORY OF DIET CONTROLLED GESTATIONAL DIABETES MELLITUS (GDM): ICD-10-CM

## 2023-02-07 DIAGNOSIS — Z82.79 FAMILY HISTORY OF TRISOMY 18: ICD-10-CM

## 2023-02-07 DIAGNOSIS — O36.8990 UMBILICAL CORD CYST DURING PREGNANCY, ANTEPARTUM: ICD-10-CM

## 2023-02-07 PROBLEM — O46.8X1 SUBCHORIONIC HEMORRHAGE IN FIRST TRIMESTER: Status: RESOLVED | Noted: 2023-01-17 | Resolved: 2023-02-07

## 2023-02-07 PROBLEM — O24.419 GESTATIONAL DIABETES MELLITUS (GDM), ANTEPARTUM: Status: ACTIVE | Noted: 2018-10-13

## 2023-02-07 PROBLEM — O41.8X10 SUBCHORIONIC HEMORRHAGE IN FIRST TRIMESTER: Status: RESOLVED | Noted: 2023-01-17 | Resolved: 2023-02-07

## 2023-02-07 PROBLEM — R10.9 ABDOMINAL PAIN: Status: RESOLVED | Noted: 2022-12-17 | Resolved: 2023-02-07

## 2023-02-07 PROBLEM — O44.00 PLACENTA PREVIA ANTEPARTUM: Status: ACTIVE | Noted: 2023-02-07

## 2023-02-07 PROBLEM — O20.9 VAGINAL BLEEDING AFFECTING EARLY PREGNANCY: Status: ACTIVE | Noted: 2023-02-07

## 2023-02-07 PROBLEM — O99.210 OBESITY AFFECTING PREGNANCY, ANTEPARTUM: Status: ACTIVE | Noted: 2018-10-13

## 2023-02-07 LAB
BACTERIA SPEC AEROBE CULT: ABNORMAL
EXTERNAL CYSTIC FIBROSIS: NEGATIVE
EXTERNAL NIPT: NORMAL
GLUCOSE UR STRIP-MCNC: NEGATIVE MG/DL
PROT UR STRIP-MCNC: NEGATIVE MG/DL
VZV IGG SER QL: NORMAL

## 2023-02-07 PROCEDURE — 99214 OFFICE O/P EST MOD 30 MIN: CPT | Performed by: OBSTETRICS & GYNECOLOGY

## 2023-02-07 RX ORDER — MULTIVIT,IRON,MINERALS/LUTEIN
TABLET ORAL
COMMUNITY
Start: 2022-11-04 | End: 2023-02-07 | Stop reason: SDUPTHER

## 2023-02-07 RX ORDER — CEPHALEXIN 500 MG/1
500 CAPSULE ORAL 3 TIMES DAILY
Qty: 21 CAPSULE | Refills: 0 | Status: SHIPPED | OUTPATIENT
Start: 2023-02-07 | End: 2023-03-06

## 2023-02-07 RX ORDER — NITROFURANTOIN 25; 75 MG/1; MG/1
100 CAPSULE ORAL NIGHTLY
Qty: 30 CAPSULE | Refills: 7 | Status: SHIPPED | OUTPATIENT
Start: 2023-02-21
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
## 2023-02-08 LAB
HBA1C MFR BLD: 5.8 % (ref 4.8–5.6)
HGB A MFR BLD ELPH: 97.5 % (ref 96.4–98.8)
HGB A2 MFR BLD ELPH: 2.5 % (ref 1.8–3.2)
HGB F MFR BLD ELPH: 0 % (ref 0–2)
HGB FRACT BLD-IMP: NORMAL
HGB S MFR BLD ELPH: 0 %

## 2023-02-21 LAB
Lab: NORMAL
NTRA FETAL FRACTION: NORMAL
NTRA GENDER OF FETUS: NORMAL
NTRA MONOSOMY X AGE-BASED RISK TEXT: NORMAL
NTRA MONOSOMY X RESULT TEXT: NORMAL
NTRA MONOSOMY X RISK SCORE TEXT: NORMAL
NTRA TRIPLOIDY RESULT TEXT: NORMAL
NTRA TRISOMY 13 AGE-BASED RISK TEXT: NORMAL
NTRA TRISOMY 13 RESULT TEXT: NORMAL
NTRA TRISOMY 13 RISK SCORE TEXT: NORMAL
NTRA TRISOMY 18 AGE-BASED RISK TEXT: NORMAL
NTRA TRISOMY 18 RESULT TEXT: NORMAL
NTRA TRISOMY 18 RISK SCORE TEXT: NORMAL
NTRA TRISOMY 21 AGE-BASED RISK TEXT: NORMAL
NTRA TRISOMY 21 RESULT TEXT: NORMAL
NTRA TRISOMY 21 RISK SCORE TEXT: NORMAL

## 2023-02-22 LAB
Lab: ABNORMAL
Lab: POSITIVE
NTRA ALPHA-THALASSEMIA: POSITIVE
NTRA BETA-HEMOGLOBINOPATHIES: NEGATIVE
NTRA CANAVAN DISEASE: NEGATIVE
NTRA CYSTIC FIBROSIS: NEGATIVE
NTRA DUCHENNE/BECKER MUSCULAR DYSTROPHY: NEGATIVE
NTRA FAMILIAL DYSAUTONOMIA: NEGATIVE
NTRA FRAGILE X SYNDROME: NEGATIVE
NTRA GALACTOSEMIA: NEGATIVE
NTRA GAUCHER DISEASE: NEGATIVE
NTRA MEDIUM CHAIN ACYL-COA DEHYDROGENASE DEFICIENCY: NEGATIVE
NTRA POLYCYSTIC KIDNEY DISEASE, AUTOSOMAL RECESSIVE: NEGATIVE
NTRA SMITH-LEMLI-OPITZ SYNDROME: NEGATIVE
NTRA SPINAL MUSCULAR ATROPHY: NEGATIVE
NTRA TAY-SACHS DISEASE: NEGATIVE

## 2023-02-28 ENCOUNTER — HOSPITAL ENCOUNTER (OUTPATIENT)
Facility: HOSPITAL | Age: 28
Setting detail: OBSERVATION
Discharge: HOME | End: 2023-03-01
Attending: EMERGENCY MEDICINE | Admitting: EMERGENCY MEDICINE
Payer: COMMERCIAL

## 2023-02-28 ENCOUNTER — TELEPHONE (OUTPATIENT)
Dept: OBSTETRICS AND GYNECOLOGY | Age: 28
End: 2023-02-28
Payer: COMMERCIAL

## 2023-02-28 DIAGNOSIS — N39.0 URINARY TRACT INFECTION IN FEMALE: Primary | ICD-10-CM

## 2023-02-28 DIAGNOSIS — R50.9 FEVER IN ADULT: ICD-10-CM

## 2023-02-28 DIAGNOSIS — Z3A.14 14 WEEKS GESTATION OF PREGNANCY: ICD-10-CM

## 2023-02-28 LAB
ALBUMIN SERPL-MCNC: 4.2 G/DL (ref 3.5–5.2)
ALBUMIN/GLOB SERPL: 1.3 G/DL
ALP SERPL-CCNC: 65 U/L (ref 39–117)
ALT SERPL W P-5'-P-CCNC: 22 U/L (ref 1–33)
ANION GAP SERPL CALCULATED.3IONS-SCNC: 11 MMOL/L (ref 5–15)
AST SERPL-CCNC: 21 U/L (ref 1–32)
BACTERIA UR QL AUTO: ABNORMAL /HPF
BASOPHILS # BLD AUTO: 0.02 10*3/MM3 (ref 0–0.2)
BASOPHILS NFR BLD AUTO: 0.2 % (ref 0–1.5)
BILIRUB SERPL-MCNC: 0.4 MG/DL (ref 0–1.2)
BILIRUB UR QL STRIP: NEGATIVE
BUN SERPL-MCNC: 5 MG/DL (ref 6–20)
BUN/CREAT SERPL: 7.4 (ref 7–25)
CALCIUM SPEC-SCNC: 9.4 MG/DL (ref 8.6–10.5)
CHLORIDE SERPL-SCNC: 101 MMOL/L (ref 98–107)
CLARITY UR: ABNORMAL
CO2 SERPL-SCNC: 21 MMOL/L (ref 22–29)
COLOR UR: YELLOW
CREAT SERPL-MCNC: 0.68 MG/DL (ref 0.57–1)
D-LACTATE SERPL-SCNC: 1.3 MMOL/L (ref 0.5–2)
DEPRECATED RDW RBC AUTO: 39.1 FL (ref 37–54)
EGFRCR SERPLBLD CKD-EPI 2021: 122.6 ML/MIN/1.73
EOSINOPHIL # BLD AUTO: 0.04 10*3/MM3 (ref 0–0.4)
EOSINOPHIL NFR BLD AUTO: 0.4 % (ref 0.3–6.2)
ERYTHROCYTE [DISTWIDTH] IN BLOOD BY AUTOMATED COUNT: 12.3 % (ref 12.3–15.4)
GLOBULIN UR ELPH-MCNC: 3.3 GM/DL
GLUCOSE SERPL-MCNC: 102 MG/DL (ref 65–99)
GLUCOSE UR STRIP-MCNC: NEGATIVE MG/DL
HCT VFR BLD AUTO: 39 % (ref 34–46.6)
HGB BLD-MCNC: 12.9 G/DL (ref 12–15.9)
HGB UR QL STRIP.AUTO: ABNORMAL
HYALINE CASTS UR QL AUTO: ABNORMAL /LPF
IMM GRANULOCYTES # BLD AUTO: 0.05 10*3/MM3 (ref 0–0.05)
IMM GRANULOCYTES NFR BLD AUTO: 0.5 % (ref 0–0.5)
KETONES UR QL STRIP: ABNORMAL
LEUKOCYTE ESTERASE UR QL STRIP.AUTO: ABNORMAL
LYMPHOCYTES # BLD AUTO: 1.49 10*3/MM3 (ref 0.7–3.1)
LYMPHOCYTES NFR BLD AUTO: 14.6 % (ref 19.6–45.3)
MAGNESIUM SERPL-MCNC: 1.9 MG/DL (ref 1.6–2.6)
MCH RBC QN AUTO: 28.7 PG (ref 26.6–33)
MCHC RBC AUTO-ENTMCNC: 33.1 G/DL (ref 31.5–35.7)
MCV RBC AUTO: 86.7 FL (ref 79–97)
MONOCYTES # BLD AUTO: 0.93 10*3/MM3 (ref 0.1–0.9)
MONOCYTES NFR BLD AUTO: 9.1 % (ref 5–12)
NEUTROPHILS NFR BLD AUTO: 7.68 10*3/MM3 (ref 1.7–7)
NEUTROPHILS NFR BLD AUTO: 75.2 % (ref 42.7–76)
NITRITE UR QL STRIP: NEGATIVE
NRBC BLD AUTO-RTO: 0 /100 WBC (ref 0–0.2)
PH UR STRIP.AUTO: 7 [PH] (ref 5–8)
PLATELET # BLD AUTO: 314 10*3/MM3 (ref 140–450)
PMV BLD AUTO: 8.6 FL (ref 6–12)
POTASSIUM SERPL-SCNC: 4 MMOL/L (ref 3.5–5.2)
PROCALCITONIN SERPL-MCNC: 0.15 NG/ML (ref 0–0.25)
PROT SERPL-MCNC: 7.5 G/DL (ref 6–8.5)
PROT UR QL STRIP: ABNORMAL
RBC # BLD AUTO: 4.5 10*6/MM3 (ref 3.77–5.28)
RBC # UR STRIP: ABNORMAL /HPF
REF LAB TEST METHOD: ABNORMAL
SODIUM SERPL-SCNC: 133 MMOL/L (ref 136–145)
SP GR UR STRIP: 1.01 (ref 1–1.03)
SQUAMOUS #/AREA URNS HPF: ABNORMAL /HPF
UROBILINOGEN UR QL STRIP: ABNORMAL
WBC # UR STRIP: ABNORMAL /HPF
WBC NRBC COR # BLD: 10.21 10*3/MM3 (ref 3.4–10.8)

## 2023-02-28 PROCEDURE — 87186 SC STD MICRODIL/AGAR DIL: CPT | Performed by: EMERGENCY MEDICINE

## 2023-02-28 PROCEDURE — 81001 URINALYSIS AUTO W/SCOPE: CPT | Performed by: EMERGENCY MEDICINE

## 2023-02-28 PROCEDURE — 99285 EMERGENCY DEPT VISIT HI MDM: CPT

## 2023-02-28 PROCEDURE — 36415 COLL VENOUS BLD VENIPUNCTURE: CPT

## 2023-02-28 PROCEDURE — 87077 CULTURE AEROBIC IDENTIFY: CPT | Performed by: EMERGENCY MEDICINE

## 2023-02-28 PROCEDURE — 84145 PROCALCITONIN (PCT): CPT | Performed by: EMERGENCY MEDICINE

## 2023-02-28 PROCEDURE — 85025 COMPLETE CBC W/AUTO DIFF WBC: CPT | Performed by: EMERGENCY MEDICINE

## 2023-02-28 PROCEDURE — 80053 COMPREHEN METABOLIC PANEL: CPT | Performed by: EMERGENCY MEDICINE

## 2023-02-28 PROCEDURE — 87040 BLOOD CULTURE FOR BACTERIA: CPT | Performed by: EMERGENCY MEDICINE

## 2023-02-28 PROCEDURE — 87086 URINE CULTURE/COLONY COUNT: CPT | Performed by: EMERGENCY MEDICINE

## 2023-02-28 PROCEDURE — 83735 ASSAY OF MAGNESIUM: CPT | Performed by: EMERGENCY MEDICINE

## 2023-02-28 PROCEDURE — 83605 ASSAY OF LACTIC ACID: CPT | Performed by: EMERGENCY MEDICINE

## 2023-02-28 RX ORDER — ACETAMINOPHEN 500 MG
1000 TABLET ORAL ONCE
Status: COMPLETED | OUTPATIENT
Start: 2023-02-28 | End: 2023-02-28

## 2023-02-28 RX ADMIN — ACETAMINOPHEN 1000 MG: 500 TABLET ORAL at 21:11

## 2023-02-28 RX ADMIN — SODIUM CHLORIDE 1000 ML: 9 INJECTION, SOLUTION INTRAVENOUS at 22:06

## 2023-02-28 NOTE — TELEPHONE ENCOUNTER
Pt denies severe pain,fever, and bleeding, Pt notified to proceed to ER if any of those symptoms occur, Pt agrees. Pt booked for U/S and appt with  tomorrow 02/29/2023

## 2023-02-28 NOTE — TELEPHONE ENCOUNTER
14w3d pt called c/o nausea and cramping x3 days. Pt said she had to call into work today because the cramps are so bad and tylenol has not been helping. Please advise.

## 2023-02-28 NOTE — TELEPHONE ENCOUNTER
Caller: Jessy Hope    Relationship to patient: Self    Best call back number: [phone]    Chief complaint: PT CALLED IN STATING SHE HAS BEEN EXPERIENCING ABD PAIN AS WELL AS NASEUA. PT ROUTED TO ER IF SYMPTOMS WORSEN BEFORE HEARING BACK FROM DOCTOR    Patient directed to call 911 or go to their nearest emergency room.     Patient verbalized understanding: [x] Yes  [] No

## 2023-02-28 NOTE — TELEPHONE ENCOUNTER
Please call pt and direct her to ER for sever pain, fever or bleeding. Please schedule her for u/s and visit tomorrow. Ok with NP/PA.

## 2023-03-01 ENCOUNTER — APPOINTMENT (OUTPATIENT)
Dept: ULTRASOUND IMAGING | Facility: HOSPITAL | Age: 28
End: 2023-03-01
Payer: COMMERCIAL

## 2023-03-01 ENCOUNTER — TELEPHONE (OUTPATIENT)
Dept: OBSTETRICS AND GYNECOLOGY | Age: 28
End: 2023-03-01
Payer: COMMERCIAL

## 2023-03-01 VITALS
TEMPERATURE: 98.5 F | DIASTOLIC BLOOD PRESSURE: 71 MMHG | HEIGHT: 69 IN | HEART RATE: 93 BPM | SYSTOLIC BLOOD PRESSURE: 118 MMHG | BODY MASS INDEX: 38.21 KG/M2 | RESPIRATION RATE: 18 BRPM | OXYGEN SATURATION: 100 % | WEIGHT: 258 LBS

## 2023-03-01 PROBLEM — R10.31 RIGHT LOWER QUADRANT PAIN: Status: ACTIVE | Noted: 2023-03-01

## 2023-03-01 LAB
ANION GAP SERPL CALCULATED.3IONS-SCNC: 9 MMOL/L (ref 5–15)
BUN SERPL-MCNC: 4 MG/DL (ref 6–20)
BUN/CREAT SERPL: 7.3 (ref 7–25)
CALCIUM SPEC-SCNC: 8.5 MG/DL (ref 8.6–10.5)
CHLORIDE SERPL-SCNC: 103 MMOL/L (ref 98–107)
CO2 SERPL-SCNC: 21 MMOL/L (ref 22–29)
CREAT SERPL-MCNC: 0.55 MG/DL (ref 0.57–1)
DEPRECATED RDW RBC AUTO: 36.9 FL (ref 37–54)
EGFRCR SERPLBLD CKD-EPI 2021: 129 ML/MIN/1.73
ERYTHROCYTE [DISTWIDTH] IN BLOOD BY AUTOMATED COUNT: 11.8 % (ref 12.3–15.4)
GLUCOSE SERPL-MCNC: 111 MG/DL (ref 65–99)
HCT VFR BLD AUTO: 32.1 % (ref 34–46.6)
HGB BLD-MCNC: 10.9 G/DL (ref 12–15.9)
MCH RBC QN AUTO: 29 PG (ref 26.6–33)
MCHC RBC AUTO-ENTMCNC: 34 G/DL (ref 31.5–35.7)
MCV RBC AUTO: 85.4 FL (ref 79–97)
PLATELET # BLD AUTO: 283 10*3/MM3 (ref 140–450)
PMV BLD AUTO: 8.5 FL (ref 6–12)
POTASSIUM SERPL-SCNC: 3.6 MMOL/L (ref 3.5–5.2)
RBC # BLD AUTO: 3.76 10*6/MM3 (ref 3.77–5.28)
SODIUM SERPL-SCNC: 133 MMOL/L (ref 136–145)
WBC NRBC COR # BLD: 9.45 10*3/MM3 (ref 3.4–10.8)

## 2023-03-01 PROCEDURE — G0378 HOSPITAL OBSERVATION PER HR: HCPCS

## 2023-03-01 PROCEDURE — 25010000002 CEFTRIAXONE PER 250 MG: Performed by: EMERGENCY MEDICINE

## 2023-03-01 PROCEDURE — 99252 IP/OBS CONSLTJ NEW/EST SF 35: CPT | Performed by: OBSTETRICS & GYNECOLOGY

## 2023-03-01 PROCEDURE — 80048 BASIC METABOLIC PNL TOTAL CA: CPT | Performed by: STUDENT IN AN ORGANIZED HEALTH CARE EDUCATION/TRAINING PROGRAM

## 2023-03-01 PROCEDURE — 96361 HYDRATE IV INFUSION ADD-ON: CPT

## 2023-03-01 PROCEDURE — 96365 THER/PROPH/DIAG IV INF INIT: CPT

## 2023-03-01 PROCEDURE — 85027 COMPLETE CBC AUTOMATED: CPT | Performed by: STUDENT IN AN ORGANIZED HEALTH CARE EDUCATION/TRAINING PROGRAM

## 2023-03-01 PROCEDURE — 76805 OB US >/= 14 WKS SNGL FETUS: CPT

## 2023-03-01 RX ORDER — DOXYLAMINE SUCCINATE AND PYRIDOXINE HYDROCHLORIDE, DELAYED RELEASE TABLETS 10 MG/10 MG 10; 10 MG/1; MG/1
2 TABLET, DELAYED RELEASE ORAL 2 TIMES DAILY PRN
Status: DISCONTINUED | OUTPATIENT
Start: 2023-03-01 | End: 2023-03-01 | Stop reason: HOSPADM

## 2023-03-01 RX ORDER — SODIUM CHLORIDE 0.9 % (FLUSH) 0.9 %
10 SYRINGE (ML) INJECTION AS NEEDED
Status: DISCONTINUED | OUTPATIENT
Start: 2023-03-01 | End: 2023-03-01 | Stop reason: HOSPADM

## 2023-03-01 RX ORDER — SODIUM CHLORIDE 9 MG/ML
40 INJECTION, SOLUTION INTRAVENOUS AS NEEDED
Status: DISCONTINUED | OUTPATIENT
Start: 2023-03-01 | End: 2023-03-01 | Stop reason: HOSPADM

## 2023-03-01 RX ORDER — SODIUM CHLORIDE 9 MG/ML
100 INJECTION, SOLUTION INTRAVENOUS CONTINUOUS
Status: DISCONTINUED | OUTPATIENT
Start: 2023-03-01 | End: 2023-03-01 | Stop reason: HOSPADM

## 2023-03-01 RX ORDER — SODIUM CHLORIDE 0.9 % (FLUSH) 0.9 %
10 SYRINGE (ML) INJECTION EVERY 12 HOURS SCHEDULED
Status: DISCONTINUED | OUTPATIENT
Start: 2023-03-01 | End: 2023-03-01 | Stop reason: HOSPADM

## 2023-03-01 RX ORDER — ACETAMINOPHEN 325 MG/1
650 TABLET ORAL EVERY 4 HOURS PRN
Status: DISCONTINUED | OUTPATIENT
Start: 2023-03-01 | End: 2023-03-01 | Stop reason: HOSPADM

## 2023-03-01 RX ORDER — DIPHENHYDRAMINE HYDROCHLORIDE 25 MG/1
25 CAPSULE ORAL 3 TIMES DAILY
Qty: 90 TABLET | Refills: 2 | Status: SHIPPED | OUTPATIENT
Start: 2023-03-01

## 2023-03-01 RX ADMIN — ACETAMINOPHEN 650 MG: 325 TABLET, FILM COATED ORAL at 04:46

## 2023-03-01 RX ADMIN — SODIUM CHLORIDE 100 ML/HR: 9 INJECTION, SOLUTION INTRAVENOUS at 11:11

## 2023-03-01 RX ADMIN — SODIUM CHLORIDE 100 ML/HR: 9 INJECTION, SOLUTION INTRAVENOUS at 01:23

## 2023-03-01 RX ADMIN — CEFTRIAXONE 2 G: 2 INJECTION, POWDER, FOR SOLUTION INTRAMUSCULAR; INTRAVENOUS at 00:03

## 2023-03-01 RX ADMIN — ACETAMINOPHEN 650 MG: 325 TABLET, FILM COATED ORAL at 11:57

## 2023-03-01 RX ADMIN — Medication 10 ML: at 01:22

## 2023-03-01 RX ADMIN — DOXYLAMINE SUCCINATE AND PYRIDOXINE HYDROCHLORIDE, DELAYED RELEASE TABLETS 10 MG/10 MG 2 TABLET: 10; 10 TABLET, DELAYED RELEASE ORAL at 08:42

## 2023-03-01 RX ADMIN — Medication 10 ML: at 08:43

## 2023-03-01 NOTE — PROGRESS NOTES
ED OBSERVATION PROGRESS/DISCHARGE SUMMARY    Date of Admission: 2/28/2023   LOS: 0 days   PCP: System, Provider Not In    Subjective  Patient's abdominal pain has improved, feeling well this afternoon    Hospital Outcome: 28-year-old female admitted to the observation unit for further evaluation of right lower quadrant pain.  Patient is currently pregnant, 14 weeks 4 days.  Patient ultrasound yesterday which showed a single live fetus with a mean gestational age by ultrasound at 14 weeks 5 days, heart rate 161 bpm, fetal movement identified, anterior grade 0 placenta without evidence of abruption or previa.  Patient was seen by OB/GYN, discussed with Dr. Giordano.  Patient's symptoms have improved and she will be sending vitamin B6/Unisom to pharmacy.  Patient will follow-up with her regular OB/GYN Dr. Marie as outpatient.  Discussed with patient who expressed understanding and is in agreement with plan, usual return to ER precautions advised.    ROS:  General: no fevers, chills  Respiratory: no cough, dyspnea  Cardiovascular: no chest pain, palpitations  Abdomen: No abdominal pain, nausea, vomiting, or diarrhea  Neurologic: No focal weakness    Objective   Physical Exam:  I have reviewed the vital signs.  Temp:  [98.5 °F (36.9 °C)-100.9 °F (38.3 °C)] 98.5 °F (36.9 °C)  Heart Rate:  [] 99  Resp:  [18-20] 18  BP: (110-148)/() 110/71  General Appearance:    Alert, cooperative, no distress  Head:    Normocephalic, atraumatic  Eyes:    Sclerae anicteric  Neck:   Supple, no mass  Lungs: Clear to auscultation bilaterally, respirations unlabored  Heart: Regular rate and rhythm, S1 and S2 normal, no murmur, rub or gallop  Abdomen:  Soft, non-tender, bowel sounds active, nondistended  Extremities: No clubbing, cyanosis, or edema to lower extremities  Pulses:  2+ and symmetric in distal lower extremities  Skin: No rashes   Neurologic: Oriented x3, Normal strength to extremities    Results Review:    I have reviewed  the labs, radiology results and diagnostic studies.    Results from last 7 days   Lab Units 03/01/23  0441   WBC 10*3/mm3 9.45   HEMOGLOBIN g/dL 10.9*   HEMATOCRIT % 32.1*   PLATELETS 10*3/mm3 283     Results from last 7 days   Lab Units 03/01/23  0441 02/28/23  2137   SODIUM mmol/L 133* 133*   POTASSIUM mmol/L 3.6 4.0   CHLORIDE mmol/L 103 101   CO2 mmol/L 21.0* 21.0*   BUN mg/dL 4* 5*   CREATININE mg/dL 0.55* 0.68   CALCIUM mg/dL 8.5* 9.4   BILIRUBIN mg/dL  --  0.4   ALK PHOS U/L  --  65   ALT (SGPT) U/L  --  22   AST (SGOT) U/L  --  21   GLUCOSE mg/dL 111* 102*     Imaging Results (Last 24 Hours)     Procedure Component Value Units Date/Time    US Ob 14 + Weeks Single or First Gestation [[id number]] Collected: 03/01/23 0228     Updated: 03/01/23 0228    Narrative:        Patient: FRANKLIN RICHARDS  Time Out: 02:28  Exam(s): US OB 2nd TRIMESTER     EXAM:    US Pregnancy, Transvaginal    CLINICAL HISTORY:     Reason for exam: 14w4d, RLQ abdominal pain.    TECHNIQUE:    Real-time endovaginal obstetrical ultrasound of the maternal pelvis and   second or third trimester pregnancy with image documentation.    Endovaginal imaging was used for better evaluation of the fetus and   adnexa.    COMPARISON:    No relevant prior studies available.    FINDINGS:      Single live fetus with a mean gestational age by ultrasound of 14 weeks 5   days.  Heart rate 161 bpm.  Fetal movement was identified by the   technologist.  Anterior grade 0 placenta without evidence of abruption or   previa.      Impression:          Electronically signed by Macho Sanchez DO, Diplomate American Board of   Radiology on 03-01-23 at 0228          I have reviewed the medications.  ---------------------------------------------------------------------------------------------  Assessment & Plan   Assessment/Problem List    Right lower quadrant pain      Plan:    Lower abdominal pain  Pregnancy, 14w4d  UTI, history of recurrent UTIs  -Lower abdominal pain  worse on the right for 3 days.  Patient noted febrile with tachycardia on ED arrival that improved after IV fluids however there is concern for possible appendicitis, OB consulted and agree we will observe overnight to see how patient does clinically before considering low radiation CT  -No vaginal bleeding or vaginal discharge  -OB ultrasound reviewed, single live fetus with a mean gestational age by ultrasound at 14 weeks 5 days, heart rate 161 bpm, fetal movement identified by technologist, anterior grade 0 placenta without evidence of abruption or previa  -OB consulted by ER provider, will follow in consultation, discussed with Dr. Giordano  -IV Rocephin  -IV fluids  -Antiemetics as needed  -Vitamin B6/Unisom  -Follow-up with Dr. Marie outpatient     Asthma  -No acute exacerbation    Disposition: Home    Follow-up after Discharge: OB/GYN    45 minutes has been spent by [facility] providers in the care of this patient while under observation status     This note will serve as discharge summary    ERMIAS Adame 03/01/23 07:28 EST

## 2023-03-01 NOTE — ED NOTES
Pt arrived by car with boyfriend reporting abdominal pain and cramping for three days. She also states that she has had nausea and vomiting with the cramping. Pt is 14 weeks pregnant.

## 2023-03-01 NOTE — PLAN OF CARE
Goal Outcome Evaluation:              Outcome Evaluation: patient discharging home with obgyn follow up. patient received dc and rx instructions. Patient verbalized understanding and denied having questions.

## 2023-03-01 NOTE — CONSULTS
[facility]   Obstetrics and Gynecology       Name:  Jessy Hope        MR#:  [id number]      Progress Note    Brief note:  28 y.o.  at 14w4d admitted last night with nausea/vomiting and RLQ pain. Patient reports pain is improved today with tylenol.  Tolerating PO.       Patient Active Problem List   Diagnosis   • Umbilical cord cyst during pregnancy, antepartum   • Family history of trisomy 18   • Increased BMI   • Urinary tract infection in mother during first trimester of pregnancy   • ASCUS with positive high risk HPV cervical   • Vaginal bleeding affecting early pregnancy   • Placenta previa antepartum   • History of diet controlled gestational diabetes mellitus (GDM)   • Obesity affecting pregnancy, antepartum   • Right lower quadrant pain       OB History    Para Term  AB Living   3 2 1 1   1   SAB IAB Ectopic Molar Multiple Live Births             1      # Outcome Date GA Lbr Slick/2nd Weight Sex Delivery Anes PTL Lv   3 Current            2  2018        FD      Complications: Stillborn, abnormal   1 Term 06/19/15 39w0d  3090 g (6 lb 13 oz) F Vag-Spont   ROCHELLE     # 1 - Date: 06/19/15, Sex: Female, Weight: 3090 g (6 lb 13 oz), GA: 39w0d, Delivery: Vaginal, Spontaneous, Apgar1: None, Apgar5: None, Living: Living, Birth Comments: None    # 2 - Date: , Sex: None, Weight: None, GA: None, Delivery: None, Apgar1: None, Apgar5: None, Living: Fetal Demise, Birth Comments: None    # 3 - Date: None, Sex: None, Weight: None, GA: None, Delivery: None, Apgar1: None, Apgar5: None, Living: None, Birth Comments: None      Review of Systems   Constitutional: Negative for chills, fatigue and fever.   Gastrointestinal: Negative for abdominal pain, nausea and vomiting.   Genitourinary: Negative for vaginal bleeding, vaginal discharge and vaginal pain.   All other systems reviewed and are negative.            Vitals:  Temperature: Temp:  [98.5 °F (36.9 °C)-100.9 °F (38.3 °C)] 98.5  °F (36.9 °C)  Temp Source: Temp src: Oral  BP:  BP: (110-148)/() 118/71  Pulse:  Heart Rate:  [] 93  Respirations: Resp:  [16-20] 18     Physical Examination: General appearance - alert, well appearing, and in no distress  Abdomen - gravid,soft, nontender, nondistended, no masses or organomegaly  Musculoskeletal - no joint tenderness, deformity or swelling  Extremities - peripheral pulses normal, no pedal edema, no clubbing or cyanosis  Skin - normal coloration and turgor, no rashes, no suspicious skin lesions noted    LABS:   Lab Results   Component Value Date    WBC 9.45 03/01/2023    HGB 10.9 (L) 03/01/2023    HCT 32.1 (L) 03/01/2023    MCV 85.4 03/01/2023     03/01/2023     Results from last 7 days   Lab Units 03/01/23  0441 02/28/23  2137   SODIUM mmol/L 133* 133*   POTASSIUM mmol/L 3.6 4.0   CHLORIDE mmol/L 103 101   CO2 mmol/L 21.0* 21.0*   BUN mg/dL 4* 5*   CREATININE mg/dL 0.55* 0.68   CALCIUM mg/dL 8.5* 9.4   BILIRUBIN mg/dL  --  0.4   ALK PHOS U/L  --  65   ALT (SGPT) U/L  --  22   AST (SGOT) U/L  --  21   GLUCOSE mg/dL 111* 102*     US Ob 14 + Weeks Single or First Gestation    Result Date: 3/1/2023  Patient: FRANKLIN RICHARDS  Time Out: 02:28 Exam(s): US OB 2nd TRIMESTER EXAM:   US Pregnancy, Transvaginal CLINICAL HISTORY:    Reason for exam: 14w4d, RLQ abdominal pain. TECHNIQUE:   Real-time endovaginal obstetrical ultrasound of the maternal pelvis and second or third trimester pregnancy with image documentation.  Endovaginal imaging was used for better evaluation of the fetus and adnexa. COMPARISON:   No relevant prior studies available. FINDINGS:   Single live fetus with a mean gestational age by ultrasound of 14 weeks 5 days.  Heart rate 161 bpm.  Fetal movement was identified by the technologist.  Anterior grade 0 placenta without evidence of abruption or previa.     Electronically signed by Macho Sanchez DO, Diplomate American Board of Radiology on 03-01-23 at 8292               1. IUP @ 14w4d    2. Nausea/vomitng of pregnancy - improved - d/c home with Vitamin b6/Unisom    3. RLQ pain - improved with Tylenol and Rocephin x 1    D/C home        Karlie Giordano MD  3/1/2023 13:10 EST

## 2023-03-01 NOTE — ED NOTES
Nursing report ED to floor  Jessy Hope  28 y.o.  female    HPI :   Chief Complaint   Patient presents with    Pregnancy Problem    Abdominal Pain    Nausea    Vomiting       Admitting doctor:   Gregorio HANNON MD    Admitting diagnosis:   The primary encounter diagnosis was Urinary tract infection in female. Diagnoses of 14 weeks gestation of pregnancy and Fever in adult were also pertinent to this visit.    Code status:   Current Code Status       Date Active Code Status Order ID Comments User Context       3/1/2023 0010 CPR (Attempt to Resuscitate) [id number]  Amy Marcano PA-C ED        Question Answer    Code Status (Patient has no pulse and is not breathing) CPR (Attempt to Resuscitate)    Medical Interventions (Patient has pulse or is breathing) Full Support                    Allergies:   Morphine    Isolation:   No active isolations    Intake and Output    Intake/Output Summary (Last 24 hours) at 3/1/2023 0046  Last data filed at 3/1/2023 0045  Gross per 24 hour   Intake 1000 ml   Output --   Net 1000 ml       Weight:       02/28/23  2100   Weight: 113 kg (250 lb)       Most recent vitals:   Vitals:    02/28/23 2316 02/28/23 2346 03/01/23 0005 03/01/23 0016   BP: 130/79 135/86  117/74   BP Location:       Patient Position:       Pulse:  98  108   Resp:       Temp:   98.9 °F (37.2 °C)    TempSrc:   Oral    SpO2:  100%  99%   Weight:       Height:           Active LDAs/IV Access:   Lines, Drains & Airways       Active LDAs       Name Placement date Placement time Site Days    Peripheral IV 02/28/23 2206 Left Antecubital 02/28/23 2206  Antecubital  less than 1                    Labs (abnormal labs have a star):   Labs Reviewed   COMPREHENSIVE METABOLIC PANEL - Abnormal; Notable for the following components:       Result Value    Glucose 102 (*)     BUN 5 (*)     Sodium 133 (*)     CO2 21.0 (*)     All other components within normal limits    Narrative:     GFR Normal >60  Chronic Kidney  "Disease <60  Kidney Failure <15     URINALYSIS W/ MICROSCOPIC IF INDICATED (NO CULTURE) - Abnormal; Notable for the following components:    Appearance, UA Cloudy (*)     Ketones, UA Trace (*)     Blood, UA Trace (*)     Protein, UA Trace (*)     Leuk Esterase, UA Moderate (2+) (*)     All other components within normal limits   CBC WITH AUTO DIFFERENTIAL - Abnormal; Notable for the following components:    Lymphocyte % 14.6 (*)     Neutrophils, Absolute 7.68 (*)     Monocytes, Absolute 0.93 (*)     All other components within normal limits   URINALYSIS, MICROSCOPIC ONLY - Abnormal; Notable for the following components:    RBC, UA 3-5 (*)     WBC, UA Too Numerous to Count (*)     Bacteria, UA 4+ (*)     Squamous Epithelial Cells, UA 3-6 (*)     All other components within normal limits   LACTIC ACID, PLASMA - Normal   PROCALCITONIN - Normal    Narrative:     As a Marker for Sepsis (Non-Neonates):    1. <0.5 ng/mL represents a low risk of severe sepsis and/or septic shock.  2. >2 ng/mL represents a high risk of severe sepsis and/or septic shock.    As a Marker for Lower Respiratory Tract Infections that require antibiotic therapy:    PCT on Admission    Antibiotic Therapy       6-12 Hrs later    >0.5                Strongly Recommended  >0.25 - <0.5        Recommended   0.1 - 0.25          Discouraged              Remeasure/reassess PCT  <0.1                Strongly Discouraged     Remeasure/reassess PCT    As 28 day mortality risk marker: \"Change in Procalcitonin Result\" (>80% or <=80%) if Day 0 (or Day 1) and Day 4 values are available. Refer to http://www.[employer]-pct-calculator.com    Change in PCT <=80%  A decrease of PCT levels below or equal to 80% defines a positive change in PCT test result representing a higher risk for 28-day all-cause mortality of patients diagnosed with severe sepsis for septic shock.    Change in PCT >80%  A decrease of PCT levels of more than 80% defines a negative change in PCT result " representing a lower risk for 28-day all-cause mortality of patients diagnosed with severe sepsis or septic shock.      MAGNESIUM - Normal   BLOOD CULTURE   BLOOD CULTURE   URINE CULTURE   CBC (NO DIFF)   BASIC METABOLIC PANEL   CBC AND DIFFERENTIAL    Narrative:     The following orders were created for panel order CBC & Differential.  Procedure                               Abnormality         Status                     ---------                               -----------         ------                     CBC Auto Differential[[id number]]        Abnormal            Final result                 Please view results for these tests on the individual orders.       EKG:   No orders to display       Meds given in ED:   Medications   sodium chloride 0.9 % flush 10 mL (has no administration in time range)   sodium chloride 0.9 % flush 10 mL (has no administration in time range)   sodium chloride 0.9 % infusion 40 mL (has no administration in time range)   sodium chloride 0.9 % infusion (has no administration in time range)   acetaminophen (TYLENOL) tablet 650 mg (has no administration in time range)   acetaminophen (TYLENOL) tablet 1,000 mg (1,000 mg Oral Given 2/28/23 2111)   sodium chloride 0.9 % bolus 1,000 mL (0 mL Intravenous Stopped 3/1/23 0045)   cefTRIAXone (ROCEPHIN) 2 g in sodium chloride 0.9 % 100 mL IVPB-VTB (0 g Intravenous Stopped 3/1/23 0046)       Imaging results:  No radiology results for the last day    Ambulatory status:   - up ad gavin    Social issues:   Social History     Socioeconomic History    Marital status: Single   Tobacco Use    Smoking status: Never    Smokeless tobacco: Never   Vaping Use    Vaping Use: Never used   Substance and Sexual Activity    Alcohol use: Not Currently    Drug use: Never    Sexual activity: Yes     Partners: Male     Birth control/protection: None       NIH Stroke Scale:         Emily Torres RN  03/01/23 00:46 EST

## 2023-03-01 NOTE — DISCHARGE INSTRUCTIONS
Medications as prescribed by your OB/GYN.  Return to the emergency department with worsening symptoms, uncontrolled pain, inability to tolerate oral liquids, fever greater than 101°F not controlled by Tylenol or as needed with emergent concerns.

## 2023-03-01 NOTE — H&P
[facility]   HISTORY AND PHYSICAL    Patient Name: Jessy Hope  : 1995  MRN: [id number]  Primary Care Physician:  System, Provider Not In  Date of admission: 2023    Subjective   Subjective     Chief Complaint:   Chief Complaint   Patient presents with   • Pregnancy Problem   • Abdominal Pain   • Nausea   • Vomiting         HPI:    Jessy Hope is a  28 y.o. female that is 14d4w pregnant with a history of asthma and frequent UTIs who presents to [facility] ER with right lower abdominal pain nausea and vomiting.  Patient states for the past 3 days she has had some lower abdominal pain that is worse on the right side with nausea and episodes of nonbloody nonbilious vomiting.  She states that the pain feels aching with intermittent sharp pains.  She reports the pain has been constant and worsening.  She states she has not been able to keep much down other than some liquids.  She denies any vaginal bleeding or vaginal discharge.  She states she has been taking Tylenol at home which provides temporary relief.  She denies known fevers or chills.  Denies diarrhea.  She states she had a slight headache yesterday but that has since resolved.  Denies lightheadedness and dizziness.  Denies pain with urination or change in frequency.  She follows with Dr. Marie at [facility], she contacted the office yesterday and they scheduled her for an ultrasound and appointment today at 1130.    In the ER, patient was noted with temperature of 100.9 and elevated heart rate of 144.  Patient was given IV fluids and vital signs stabilized.  Lab work fairly unremarkable, UA does note 4+ bacteria with moderate leukocytes.  ER provider spoke with OB hospitalist on-call who agreed with overnight observation, recommended ultrasound and will follow in consultation.    Review of Systems   All systems were reviewed and negative except for: what is mentioned above in the HPI.      Personal History     Past Medical History:   Diagnosis Date   • Asthma    • Gestational diabetes        No past surgical history on file.    Family History: family history includes Breast cancer in her paternal grandmother; Diabetes in her brother, father, and mother; Hypertension in her father and mother; Leukemia in her brother. Otherwise pertinent FHx was reviewed and not pertinent to current issue.    Social History:  reports that she has never smoked. She has never used smokeless tobacco. She reports that she does not currently use alcohol. She reports that she does not use drugs.    Home Medications:  Acetaminophen, Prenatal Vitamin/Min +DHA, cephalexin, doxylamine-pyridoxine, and nitrofurantoin (macrocrystal-monohydrate)    Allergies:  Allergies   Allergen Reactions   • Morphine Hives       Objective   Objective     Vitals:   Temp:  [98.9 °F (37.2 °C)-100.9 °F (38.3 °C)] 98.9 °F (37.2 °C)  Heart Rate:  [] 98  Resp:  [20] 20  BP: (117-148)/() 135/86  Physical Exam    Constitutional: 28-year-old female, well-nourished, no acute distress on room air   Eyes: PERRLA, sclerae anicteric, no conjunctival injection   HENT: NCAT, mucous membranes moist   Neck: Supple, no thyromegaly, no lymphadenopathy, trachea midline   Respiratory: Clear to auscultation bilaterally, nonlabored respirations    Cardiovascular: RRR, no murmurs, rubs, or gallops, palpable pedal pulses bilaterally   Gastrointestinal: Positive bowel sounds, soft, tenderness to palpation worse in the right lower quadrant than the left, no guarding.   Musculoskeletal: No bilateral ankle edema, no clubbing or cyanosis to extremities   Psychiatric: Appropriate affect, cooperative   Neurologic: Oriented x 3, strength symmetric in all extremities, Cranial Nerves grossly intact to confrontation, speech clear   Skin: No rashes     Result Review    Result Review:  I have personally reviewed the results from the time of this admission to 3/1/2023  00:19 EST and agree with these findings:  [x]  Laboratory list / accordion  []  Microbiology  []  Radiology  []  EKG/Telemetry   []  Cardiology/Vascular   []  Pathology  [x]  Old records  []  Other:  Most notable findings include: UA 4+ bacteria, too numerous to count white blood cells, moderate leukocytes, trace blood, trace ketones    Assessment & Plan   Assessment / Plan     Brief Patient Summary:  Jessy Hope is a  28 y.o. female who is 14 days 4 weeks pregnant being admitted to the observation unit for further evaluation of lower abdominal pain worse on the right and finding of UTI.  Plan for IV antibiotics and OB consultation.    Active Hospital Problems:  Active Hospital Problems    Diagnosis    • **Right lower quadrant pain      Plan:     Lower abdominal pain  Pregnancy, 14w4d  UTI, history of recurrent UTIs  -Lower abdominal pain worse on the right for 3 days.  Patient noted febrile with tachycardia on ED arrival that improved after IV fluids however there is concern for possible appendicitis, OB consulted and agree we will observe overnight to see how patient does clinically before considering low radiation CT  -No vaginal bleeding or vaginal discharge  -OB ultrasound ordered  -OB consulted by ER provider, will follow in consultation  -IV Rocephin  -IV fluids  -Antiemetics as needed    Asthma  -No acute exacerbation    DVT prophylaxis:  Mechanical DVT prophylaxis orders are present.    CODE STATUS:    Code Status (Patient has no pulse and is not breathing): CPR (Attempt to Resuscitate)  Medical Interventions (Patient has pulse or is breathing): Full Support    Admission Status:  I believe this patient meets observation status.    80 minutes have been spent by [facility] providers in the care of this patient while under observation status.      I wore an face mask, eye protection, and gloves during this patient encounter. Patient also wearing a surgical mask. Hand  hygeine performed before and after seeing the patient.      Electronically signed by Amy Marcano PA-C, 03/01/23, 12:19 AM EST.

## 2023-03-01 NOTE — H&P
MD ATTESTATION NOTE    The TRACEE and I have discussed this patient's history, physical exam, and treatment plan.  I have reviewed the documentation and personally had a face to face interaction with the patient. I affirm the documentation and agree with the treatment and plan.  The attached note describes my personal findings.      I provided a substantive portion of the care of the patient.  I personally performed the physical exam in its entirety, and below are my findings.  For this patient encounter, the patient wore surgical mask, I wore full protective PPE including N95 and eye protection.      Brief HPI: The patient is G3, P1 at 14 weeks +4 days admitted for lower abdominal pain.  There was concern for appendicitis.  Patient's abdominal pain has improved overnight.    PHYSICAL EXAM  ED Triage Vitals [02/28/23 2100]   Temp Heart Rate Resp BP SpO2   (!) 100.9 °F (38.3 °C) (!) 144 20 (!) 148/118 100 %      Temp src Heart Rate Source Patient Position BP Location FiO2 (%)   Tympanic Monitor Standing Right arm --         GENERAL: Fixed no acute distress  HENT: nares patent  EYES: no scleral icterus  CV: regular rhythm, normal rate  RESPIRATORY: normal effort  ABDOMEN: soft  MUSCULOSKELETAL: no deformity  NEURO: alert, moves all extremities, follows commands  PSYCH:  calm, cooperative  SKIN: warm, dry    Vital signs and nursing notes reviewed.        Plan: OB consult.  Labs imaging reassuring. clinical picture improving.

## 2023-03-01 NOTE — ED PROVIDER NOTES
EMERGENCY DEPARTMENT ENCOUNTER    Room Number:  105/1  Date of encounter:  3/1/2023  PCP: System, Provider Not In  Historian: Patient      HPI:  Chief Complaint: Abdominal pain, nausea, vomiting  A complete HPI/ROS/PMH/PSH/SH/FH are unobtainable due to: None    Context: Jessy Hope is a 28 y.o. female who presents to the ED via private vehicle for 2 to 3 days of increasing lower abdominal pain worse in the right lower quadrant with subjective fevers, nausea, vomiting.  Denies any diarrhea.  Denies any dysuria or urinary urgency or frequency.  She is 14 weeks pregnant.  Tylenol helps briefly.       MEDICAL RECORD REVIEW    External (non-ED) record review: Chart review in epic shows telephone encounters with her OB/GYN office, she is scheduled to see them tomorrow for an ultrasound    PAST MEDICAL HISTORY  Active Ambulatory Problems     Diagnosis Date Noted   • Umbilical cord cyst during pregnancy, antepartum 01/17/2023   • Family history of trisomy 18 01/17/2023   • Increased BMI 01/17/2023   • Urinary tract infection in mother during first trimester of pregnancy 01/23/2023   • ASCUS with positive high risk HPV cervical 01/23/2023   • Vaginal bleeding affecting early pregnancy 02/07/2023   • Placenta previa antepartum 02/07/2023   • History of diet controlled gestational diabetes mellitus (GDM) 10/13/2018   • Obesity affecting pregnancy, antepartum 10/13/2018     Resolved Ambulatory Problems     Diagnosis Date Noted   • Abdominal pain 12/17/2022   • Subchorionic hemorrhage in first trimester 01/17/2023     Past Medical History:   Diagnosis Date   • Asthma    • Gestational diabetes          PAST SURGICAL HISTORY  History reviewed. No pertinent surgical history.      FAMILY HISTORY  Family History   Problem Relation Age of Onset   • Diabetes Mother    • Hypertension Mother    • Diabetes Father    • Hypertension Father    • Diabetes Brother    • Leukemia Brother    • Breast cancer Paternal Grandmother    • Ovarian  cancer Neg Hx    • Uterine cancer Neg Hx    • Colon cancer Neg Hx          SOCIAL HISTORY  Social History     Socioeconomic History   • Marital status: Single   Tobacco Use   • Smoking status: Never   • Smokeless tobacco: Never   Vaping Use   • Vaping Use: Never used   Substance and Sexual Activity   • Alcohol use: Not Currently   • Drug use: Never   • Sexual activity: Yes     Partners: Male     Birth control/protection: None         ALLERGIES  Morphine        REVIEW OF SYSTEMS  Review of Systems     All systems reviewed and negative except for those discussed in HPI.       PHYSICAL EXAM    I have reviewed the triage vital signs and nursing notes.    ED Triage Vitals [02/28/23 2100]   Temp Heart Rate Resp BP SpO2   (!) 100.9 °F (38.3 °C) (!) 144 20 (!) 148/118 100 %      Temp src Heart Rate Source Patient Position BP Location FiO2 (%)   Tympanic Monitor Standing Right arm --       Physical Exam  General: Appears uncomfortable, nontoxic  HEENT: Mucous membranes moist, atraumatic, EOMI  Neck: Full ROM  Pulm: Symmetric chest rise, nonlabored, lungs CTAB  Cardiovascular: Regular rhythm tachycardia, intact distal pulses  GI: Soft, Fuhs lower abdominal tenderness palpation worse in the right lower quadrant, nondistended, no rebound, no guarding, bowel sounds present  MSK: Full ROM, no deformity  Skin: Warm, dry  Neuro: Awake, alert, oriented x 4, GCS 15, moving all extremities, no focal deficits  Psych: Calm, cooperative      N95, protective eye goggles, and gloves used during this encounter. Patient in surgical mask.      LAB RESULTS  Recent Results (from the past 24 hour(s))   Comprehensive Metabolic Panel    Collection Time: 02/28/23  9:37 PM    Specimen: Blood   Result Value Ref Range    Glucose 102 (H) 65 - 99 mg/dL    BUN 5 (L) 6 - 20 mg/dL    Creatinine 0.68 0.57 - 1.00 mg/dL    Sodium 133 (L) 136 - 145 mmol/L    Potassium 4.0 3.5 - 5.2 mmol/L    Chloride 101 98 - 107 mmol/L    CO2 21.0 (L) 22.0 - 29.0 mmol/L     Calcium 9.4 8.6 - 10.5 mg/dL    Total Protein 7.5 6.0 - 8.5 g/dL    Albumin 4.2 3.5 - 5.2 g/dL    ALT (SGPT) 22 1 - 33 U/L    AST (SGOT) 21 1 - 32 U/L    Alkaline Phosphatase 65 39 - 117 U/L    Total Bilirubin 0.4 0.0 - 1.2 mg/dL    Globulin 3.3 gm/dL    A/G Ratio 1.3 g/dL    BUN/Creatinine Ratio 7.4 7.0 - 25.0    Anion Gap 11.0 5.0 - 15.0 mmol/L    eGFR 122.6 >60.0 mL/min/1.73   Lactic Acid, Plasma    Collection Time: 02/28/23  9:37 PM    Specimen: Blood   Result Value Ref Range    Lactate 1.3 0.5 - 2.0 mmol/L   Procalcitonin    Collection Time: 02/28/23  9:37 PM    Specimen: Blood   Result Value Ref Range    Procalcitonin 0.15 0.00 - 0.25 ng/mL   Magnesium    Collection Time: 02/28/23  9:37 PM    Specimen: Blood   Result Value Ref Range    Magnesium 1.9 1.6 - 2.6 mg/dL   CBC Auto Differential    Collection Time: 02/28/23  9:37 PM    Specimen: Blood   Result Value Ref Range    WBC 10.21 3.40 - 10.80 10*3/mm3    RBC 4.50 3.77 - 5.28 10*6/mm3    Hemoglobin 12.9 12.0 - 15.9 g/dL    Hematocrit 39.0 34.0 - 46.6 %    MCV 86.7 79.0 - 97.0 fL    MCH 28.7 26.6 - 33.0 pg    MCHC 33.1 31.5 - 35.7 g/dL    RDW 12.3 12.3 - 15.4 %    RDW-SD 39.1 37.0 - 54.0 fl    MPV 8.6 6.0 - 12.0 fL    Platelets 314 140 - 450 10*3/mm3    Neutrophil % 75.2 42.7 - 76.0 %    Lymphocyte % 14.6 (L) 19.6 - 45.3 %    Monocyte % 9.1 5.0 - 12.0 %    Eosinophil % 0.4 0.3 - 6.2 %    Basophil % 0.2 0.0 - 1.5 %    Immature Grans % 0.5 0.0 - 0.5 %    Neutrophils, Absolute 7.68 (H) 1.70 - 7.00 10*3/mm3    Lymphocytes, Absolute 1.49 0.70 - 3.10 10*3/mm3    Monocytes, Absolute 0.93 (H) 0.10 - 0.90 10*3/mm3    Eosinophils, Absolute 0.04 0.00 - 0.40 10*3/mm3    Basophils, Absolute 0.02 0.00 - 0.20 10*3/mm3    Immature Grans, Absolute 0.05 0.00 - 0.05 10*3/mm3    nRBC 0.0 0.0 - 0.2 /100 WBC   Urinalysis With Microscopic If Indicated (No Culture) - Urine, Clean Catch    Collection Time: 02/28/23 10:36 PM    Specimen: Urine, Clean Catch   Result Value Ref Range     Color, UA Yellow Yellow, Straw    Appearance, UA Cloudy (A) Clear    pH, UA 7.0 5.0 - 8.0    Specific Gravity, UA 1.015 1.005 - 1.030    Glucose, UA Negative Negative    Ketones, UA Trace (A) Negative    Bilirubin, UA Negative Negative    Blood, UA Trace (A) Negative    Protein, UA Trace (A) Negative    Leuk Esterase, UA Moderate (2+) (A) Negative    Nitrite, UA Negative Negative    Urobilinogen, UA 1.0 E.U./dL 0.2 - 1.0 E.U./dL   Urinalysis, Microscopic Only - Urine, Clean Catch    Collection Time: 02/28/23 10:36 PM    Specimen: Urine, Clean Catch   Result Value Ref Range    RBC, UA 3-5 (A) None Seen, 0-2 /HPF    WBC, UA Too Numerous to Count (A) None Seen, 0-2 /HPF    Bacteria, UA 4+ (A) None Seen /HPF    Squamous Epithelial Cells, UA 3-6 (A) None Seen, 0-2 /HPF    Hyaline Casts, UA 7-12 None Seen /LPF    Methodology Automated Microscopy        Ordered the above labs and independently interpreted results. My findings will be discussed in the medical decision making section below        RADIOLOGY  No Radiology Exams Resulted Within Past 24 Hours    Ordered the above noted radiological studies.  Independently interpreted by me and my independent review of findings can be found in the ED Course.  See dictation for official radiology interpretation.      PROCEDURES    Procedures      MEDICATIONS GIVEN IN ER    Medications   sodium chloride 0.9 % flush 10 mL (10 mL Intravenous Given 3/1/23 0122)   sodium chloride 0.9 % flush 10 mL (has no administration in time range)   sodium chloride 0.9 % infusion 40 mL (has no administration in time range)   sodium chloride 0.9 % infusion (100 mL/hr Intravenous New Bag 3/1/23 0123)   acetaminophen (TYLENOL) tablet 650 mg (has no administration in time range)   doxylamine-pyridoxine (DICLEGIS) EC tablet 2 tablet (has no administration in time range)   acetaminophen (TYLENOL) tablet 1,000 mg (1,000 mg Oral Given 2/28/23 2111)   sodium chloride 0.9 % bolus 1,000 mL (0 mL Intravenous  Stopped 3/1/23 0045)   cefTRIAXone (ROCEPHIN) 2 g in sodium chloride 0.9 % 100 mL IVPB-VTB (0 g Intravenous Stopped 3/1/23 0046)         PROGRESS, DATA ANALYSIS, CONSULTS, AND MEDICAL DECISION MAKING    Please note that this section constitutes my independent interpretation of clinical data including lab results, radiology, EKG's.  This constitutes my independent professional opinion regarding differential diagnosis and management of this patient.  It may include any factors such as history from outside sources, review of external records, social determinants of health, management of medications, response to those treatments, and discussions with other providers.    Differential Diagnosis and Plan: Initial concern for UTI, appendicitis, kidney stone, colitis, gastroenteritis, Viral process, among others.  She is pregnant, will get labs, urinalysis, discussed with OB/GYN, and reevaluate with results    Additional sources:  - Discussed/ obtained information from independent historians:   None     - Chronic or social conditions impacting care: None     - Shared decision making:  Patient fully updated on and in agreement with the course and plan moving forward    ED Course as of 03/01/23 0130 Tue Feb 28, 2023 2159 WBC: 10.21 [DC]   2200 Hemoglobin: 12.9 [DC]   2200 Platelets: 314 [DC]   2217 Procalcitonin: 0.15 [DC]   2217 Magnesium: 1.9 [DC]   2217 Lactate: 1.3 [DC]   2309 Nitrite, UA: Negative [DC]   2309 Leukocytes, UA(!): Moderate (2+) [DC]   2309 Bacteria, UA(!): 4+ [DC]   2309 WBC, UA(!): Too Numerous to Count [DC]   2309 Glucose(!): 102 [DC]   2309 BUN(!): 5 [DC]   2309 Creatinine: 0.68 [DC]   2309 Sodium(!): 133 [DC]   2309 Potassium: 4.0 [DC]   2309 ALT (SGPT): 22 [DC]   2309 AST (SGOT): 21 [DC]   2309 Alkaline Phosphatase: 65 [DC]   2309 Total Bilirubin: 0.4 [DC]   2347 Have had 2 separate discussions with Dr. Perkins with OB/GYN, first 1 initially on arrival about the potential for CT versus MRI for  possible appendicitis, plan was for low radiation CT if labs look concerning.  Have rediscussed with her with the findings today with evidence of more of a UTI and reassuring labs with no leukocytosis, normal lactate, normal procalcitonin and will hold off on CT with plans for IV antibiotics and observation unit stay for further monitoring and OB evaluation in the morning. [DC]   2341 Patient has been fully updated on the findings today, discussions with OB/GYN, and need for hospital stay for IV antibiotics for suspected UTI.  All questions and concerns addressed [DC]      ED Course User Index  [DC] Knenedy Chaparro MD       Hospitalization Considered?: Yes, due to pregnancy with elevated pain and findings of UTI, plan for further monitoring in observation unit, CT scan if escalating pain , OB consult in the AM    Orders Placed During This Visit:  Orders Placed This Encounter   Procedures   • Blood Culture - Blood,   • Blood Culture - Blood,   • Urine Culture - Urine, Urine, Clean Catch   • US Ob 14 + Weeks Single or First Gestation   • Comprehensive Metabolic Panel   • Urinalysis With Microscopic If Indicated (No Culture) - Urine, Clean Catch   • Lactic Acid, Plasma   • Procalcitonin   • Magnesium   • CBC Auto Differential   • Urinalysis, Microscopic Only - Urine, Clean Catch   • CBC (No Diff)   • Potassium   • Magnesium   • High Sensitivity Troponin T   • Blood Gas, Arterial -   • Basic Metabolic Panel   • NPO Diet NPO Type: Sips with Meds, Ice Chips   • Intake & Output   • Weigh Patient   • Oral Care   • Place Sequential Compression Device   • Maintain Sequential Compression Device   • Telemetry - Maintain IV Access   • Continuous Cardiac Monitoring   • May Be Off Telemetry for Tests   • Vital Signs   • Pulse Oximetry, Continuous   • Up With Assistance   • Notify Provider (With Default Parameters)   • Code Status and Medical Interventions:   • OB/GYN (on-call MD unless specified)   • Inpatient Obstetrics /  Gynecology Consult   • Oxygen Therapy- Nasal Cannula; Titrate for SPO2: 90% - 95%   • Oxygen Therapy- Nasal Cannula; Titrate for SPO2: 90% - 95%   • ECG 12 Lead Chest Pain   • Insert Peripheral IV   • Initiate ED Observation Status   • CBC & Differential       Additional orders considered but not placed:      Independent interpretation of labs, radiology studies, and discussions with consultants: See ED Course        AS OF 01:30 EST VITALS:    BP - 116/61  HR - 89  TEMP - 99.5 °F (37.5 °C) (Oral)  02 SATS - 100%        DIAGNOSIS  Final diagnoses:   Urinary tract infection in female   14 weeks gestation of pregnancy   Fever in adult         DISPOSITION  HOSPITALIZATION    Discussed treatment plan and reason for hospitalization with pt/family and hospitalizing physician.  Pt/family voiced understanding of the plan for hospitalization for further testing/treatment as needed.                   --    Please note that portions of this were completed with a voice recognition program.       Note Disclaimer: At [facility], we believe that sharing information builds trust and better relationships. You are receiving this note because you are receiving care at [facility] or recently visited. It is possible you will see health information before a provider has talked with you about it. This kind of information can be easy to misunderstand. To help you fully understand what it means for your health, we urge you to discuss this note with your provider.         Kennedy Chaparro MD  03/01/23 0132

## 2023-03-01 NOTE — PLAN OF CARE
Problem: Adult Inpatient Plan of Care  Goal: Plan of Care Review  Outcome: Ongoing, Progressing  Flowsheets (Taken 3/1/2023 0516)  Progress: improving  Plan of Care Reviewed With: patient  Outcome Evaluation: Pt is alert and oriented, admission is completed. Ultrasound done at bedside. treated abdominal pain with tylenol. Normal saline transfusing at 100ml.  Independent to the bathroom. Family at bedside. Continuing plan of care.  Goal: Patient-Specific Goal (Individualized)  Outcome: Ongoing, Progressing  Goal: Absence of Hospital-Acquired Illness or Injury  Outcome: Ongoing, Progressing  Intervention: Identify and Manage Fall Risk  Recent Flowsheet Documentation  Taken 3/1/2023 0400 by Deborah Qureshi RN  Safety Promotion/Fall Prevention:   clutter free environment maintained   fall prevention program maintained   lighting adjusted   nonskid shoes/slippers when out of bed   room organization consistent   safety round/check completed  Taken 3/1/2023 0200 by Deborah Qureshi RN  Safety Promotion/Fall Prevention:   clutter free environment maintained   fall prevention program maintained   lighting adjusted   nonskid shoes/slippers when out of bed   room organization consistent   safety round/check completed  Taken 3/1/2023 0106 by Deborah Qureshi RN  Safety Promotion/Fall Prevention:   clutter free environment maintained   fall prevention program maintained   lighting adjusted   nonskid shoes/slippers when out of bed   room organization consistent   safety round/check completed  Intervention: Prevent Skin Injury  Recent Flowsheet Documentation  Taken 3/1/2023 0400 by Deborah Qureshi RN  Body Position: position changed independently  Taken 3/1/2023 0200 by Deborah Qureshi RN  Body Position: position changed independently  Taken 3/1/2023 0106 by Deborah Qureshi RN  Body Position: position changed independently  Skin Protection: adhesive use limited  Intervention: Prevent and Manage VTE (Venous Thromboembolism) Risk  Recent  Flowsheet Documentation  Taken 3/1/2023 0400 by Deborah Qureshi, RN  Activity Management:   activity adjusted per tolerance   activity encouraged  Taken 3/1/2023 0200 by Deborah Qureshi RN  Activity Management:   activity encouraged   activity adjusted per tolerance  Taken 3/1/2023 0106 by Deborah Qureshi RN  Activity Management:   activity encouraged   activity adjusted per tolerance  VTE Prevention/Management: compression stockings off  Intervention: Prevent Infection  Recent Flowsheet Documentation  Taken 3/1/2023 0400 by Deborah Qureshi RN  Infection Prevention:   hand hygiene promoted   rest/sleep promoted   single patient room provided  Taken 3/1/2023 0200 by Deborah Qureshi RN  Infection Prevention:   hand hygiene promoted   rest/sleep promoted  Taken 3/1/2023 0106 by Deborah Qureshi RN  Infection Prevention:   hand hygiene promoted   rest/sleep promoted   single patient room provided  Goal: Optimal Comfort and Wellbeing  Outcome: Ongoing, Progressing  Intervention: Provide Person-Centered Care  Recent Flowsheet Documentation  Taken 3/1/2023 0106 by Deborah Qureshi RN  Trust Relationship/Rapport:   care explained   choices provided   questions encouraged   reassurance provided  Goal: Readiness for Transition of Care  Outcome: Ongoing, Progressing  Intervention: Mutually Develop Transition Plan  Recent Flowsheet Documentation  Taken 3/1/2023 0106 by Deborah Qureshi RN  Equipment Currently Used at Home: none   Goal Outcome Evaluation:  Plan of Care Reviewed With: patient        Progress: improving  Outcome Evaluation: Pt is alert and oriented, admission is completed. Ultrasound done at bedside. treated abdominal pain with tylenol. Normal saline transfusing at 100ml.  Independent to the bathroom. Family at bedside. Continuing plan of care.

## 2023-03-02 LAB — BACTERIA SPEC AEROBE CULT: ABNORMAL

## 2023-03-05 LAB
BACTERIA SPEC AEROBE CULT: NORMAL
BACTERIA SPEC AEROBE CULT: NORMAL

## 2023-03-06 ENCOUNTER — ROUTINE PRENATAL (OUTPATIENT)
Dept: OBSTETRICS AND GYNECOLOGY | Age: 28
End: 2023-03-06
Payer: COMMERCIAL

## 2023-03-06 VITALS — SYSTOLIC BLOOD PRESSURE: 128 MMHG | BODY MASS INDEX: 37.51 KG/M2 | WEIGHT: 254 LBS | DIASTOLIC BLOOD PRESSURE: 76 MMHG

## 2023-03-06 DIAGNOSIS — Z13.1 SPECIAL SCREENING EXAMINATION FOR DIABETES MELLITUS: ICD-10-CM

## 2023-03-06 DIAGNOSIS — Z3A.15 15 WEEKS GESTATION OF PREGNANCY: Primary | ICD-10-CM

## 2023-03-06 DIAGNOSIS — Z36.1 ANTENATAL SCREENING FOR RAISED ALPHAFETOPROTEIN LEVEL: ICD-10-CM

## 2023-03-06 LAB
GLUCOSE UR STRIP-MCNC: ABNORMAL MG/DL
PROT UR STRIP-MCNC: ABNORMAL MG/DL

## 2023-03-06 PROCEDURE — 99213 OFFICE O/P EST LOW 20 MIN: CPT | Performed by: OBSTETRICS & GYNECOLOGY

## 2023-03-06 RX ORDER — CEPHALEXIN 500 MG/1
500 CAPSULE ORAL 3 TIMES DAILY
Qty: 30 CAPSULE | Refills: 0 | Status: SHIPPED | OUTPATIENT
Start: 2023-03-06 | End: 2023-03-07

## 2023-03-06 NOTE — PROGRESS NOTES
CC: OB visit  HPI: pt presents for f/u visit. She was hospitalized last week for abd pain with fever. She was given rocephin and symptoms resolved. She reports she was not given a rx for antibiotics when she left. She denies pain or issues today.   ROS:  GI: neg for abd pain  : neg for vag bleeding/pelvic pain  O: fh= 16 cm, FHT's 148 bpm, ext: no edema or cords    A/p: recurrent UTI: pt reports she completed course of keflex given last visit and started macrobid suppression. Reviewed most recent ucx and E. Coli is sensitive to these antibiotics. Will contact pharmacy to confirm antibiotics received and repeat course of keflex as planned at ED discharge. Pt has rx for macrobid suppression to take daily. She notes she has been taking this regularly. Reviewed risks of recurrent UTI and pyelo in pregnancy with pt.     abd pain/hx umbilical cord cyst: f/u u/s with cervical length next week    Alpha thal carrier: partner tested today    Hx GDM: early one hour today    Hx T18 preg: pt has targeted u/s scheduled.

## 2023-03-07 ENCOUNTER — PROCEDURE VISIT (OUTPATIENT)
Dept: OBSTETRICS AND GYNECOLOGY | Age: 28
End: 2023-03-07
Payer: COMMERCIAL

## 2023-03-07 VITALS
BODY MASS INDEX: 37.86 KG/M2 | SYSTOLIC BLOOD PRESSURE: 122 MMHG | DIASTOLIC BLOOD PRESSURE: 82 MMHG | WEIGHT: 255.6 LBS | HEIGHT: 69 IN

## 2023-03-07 DIAGNOSIS — R87.610 ASCUS WITH POSITIVE HIGH RISK HPV CERVICAL: Primary | ICD-10-CM

## 2023-03-07 DIAGNOSIS — R87.810 ASCUS WITH POSITIVE HIGH RISK HPV CERVICAL: Primary | ICD-10-CM

## 2023-03-07 DIAGNOSIS — O23.40 URINARY TRACT INFECTION IN MOTHER DURING PREGNANCY, ANTEPARTUM: ICD-10-CM

## 2023-03-07 PROBLEM — R10.31 RIGHT LOWER QUADRANT PAIN: Status: RESOLVED | Noted: 2023-03-01 | Resolved: 2023-03-07

## 2023-03-07 PROBLEM — O44.00 PLACENTA PREVIA ANTEPARTUM: Status: RESOLVED | Noted: 2023-02-07 | Resolved: 2023-03-07

## 2023-03-07 PROCEDURE — 57452 EXAM OF CERVIX W/SCOPE: CPT | Performed by: OBSTETRICS & GYNECOLOGY

## 2023-03-07 RX ORDER — SULFAMETHOXAZOLE AND TRIMETHOPRIM 800; 160 MG/1; MG/1
1 TABLET ORAL 2 TIMES DAILY
Qty: 14 TABLET | Refills: 0 | Status: SHIPPED | OUTPATIENT
Start: 2023-03-07 | End: 2023-03-07 | Stop reason: SDUPTHER

## 2023-03-07 RX ORDER — SULFAMETHOXAZOLE AND TRIMETHOPRIM 800; 160 MG/1; MG/1
1 TABLET ORAL 2 TIMES DAILY
Qty: 10 TABLET | Refills: 0 | Status: SHIPPED | OUTPATIENT
Start: 2023-03-07 | End: 2023-03-12

## 2023-03-07 NOTE — PROGRESS NOTES
Procedure   Procedures       Physical Exam  Genitourinary:           Comments: Faint AWE noted at site in red        Colposcopy Procedure Note    Indications: Pap smear 2 months ago showed: ASCUS with POSITIVE high risk HPV. The prior pap showed no abnormalities.   Prior cervical treatment: no treatment.    Procedure Details   The risks and benefits of the procedure and verbal and written informed consent obtained.    Speculum placed in vagina and excellent visualization of cervix achieved, cervix swabbed x 3 with acetic acid solution.    Findings:  Cervix: acetowhite lesion(s) noted at 3 o'clock; ECC and biopsy def as pt is pregnant      Specimens: no specimens    Complications: none.    Patient tolerated the procedure well without complications.    Plan:  Pap and colposcopy 6 weeks pp      In addition to colpo, FHT's were documented and appropriate in the 130's at the time of visit.     Reviewed recurrent UTI's with pt. She notes she has a history of this in past. She notes she completed her full course of keflex about 1 week prior to hospitalization and then started nightly macrobid. The E.coli cultured at the ER was sensitive to keflex and macrobid. Pt notes keflex has not worked for her in the past. Pt did receive a dose of Rocephin while in hospital but no further treatment for recurrent UTI. Will complete course of antibiotics with Bactrim DS and sent rx to pharmacy. Advised her to hold nightly macrobid until she completes her course of bactrim DS.  Reviewed pyelo warnings. Pt notes understanding.       3/7/2023  Liv Marie MD

## 2023-03-08 DIAGNOSIS — O24.410 DIET CONTROLLED GESTATIONAL DIABETES MELLITUS (GDM), ANTEPARTUM: Primary | ICD-10-CM

## 2023-03-08 LAB
AFP INTERP SERPL-IMP: NORMAL
AFP INTERP SERPL-IMP: NORMAL
AFP MOM SERPL: 0.65
AFP SERPL-MCNC: 16.3 NG/ML
AGE AT DELIVERY: 27.5 YR
ERYTHROCYTE [DISTWIDTH] IN BLOOD BY AUTOMATED COUNT: 11.8 % (ref 12.3–15.4)
GA METHOD: NORMAL
GA: 15.3 WEEKS
GLUCOSE 1H P 50 G GLC PO SERPL-MCNC: 163 MG/DL (ref 65–139)
HCT VFR BLD AUTO: 33.3 % (ref 34–46.6)
HGB BLD-MCNC: 11.1 G/DL (ref 12–15.9)
IDDM PATIENT QL: NO
LABORATORY COMMENT REPORT: NORMAL
MCH RBC QN AUTO: 28.8 PG (ref 26.6–33)
MCHC RBC AUTO-ENTMCNC: 33.3 G/DL (ref 31.5–35.7)
MCV RBC AUTO: 86.3 FL (ref 79–97)
MULTIPLE PREGNANCY: NO
NEURAL TUBE DEFECT RISK FETUS: NORMAL %
PLATELET # BLD AUTO: 374 10*3/MM3 (ref 140–450)
RBC # BLD AUTO: 3.86 10*6/MM3 (ref 3.77–5.28)
RESULT: NORMAL
WBC # BLD AUTO: 9.77 10*3/MM3 (ref 3.4–10.8)

## 2023-03-08 RX ORDER — BLOOD-GLUCOSE METER
1 KIT MISCELLANEOUS AS NEEDED
Qty: 1 EACH | Refills: 0 | Status: SHIPPED | OUTPATIENT
Start: 2023-03-08

## 2023-03-08 NOTE — PROGRESS NOTES
Pt notified of results and given information, Pt agrees with understanding, Pt notified if she has any additional questions to please give our office a call

## 2023-03-13 ENCOUNTER — ROUTINE PRENATAL (OUTPATIENT)
Dept: OBSTETRICS AND GYNECOLOGY | Age: 28
End: 2023-03-13
Payer: COMMERCIAL

## 2023-03-13 ENCOUNTER — TELEPHONE (OUTPATIENT)
Dept: DIABETES SERVICES | Facility: HOSPITAL | Age: 28
End: 2023-03-13
Payer: COMMERCIAL

## 2023-03-13 VITALS — DIASTOLIC BLOOD PRESSURE: 74 MMHG | WEIGHT: 253 LBS | SYSTOLIC BLOOD PRESSURE: 130 MMHG | BODY MASS INDEX: 37.36 KG/M2

## 2023-03-13 DIAGNOSIS — O23.40 URINARY TRACT INFECTION IN MOTHER DURING PREGNANCY, ANTEPARTUM: ICD-10-CM

## 2023-03-13 DIAGNOSIS — O24.410 DIET CONTROLLED GESTATIONAL DIABETES MELLITUS (GDM), ANTEPARTUM: ICD-10-CM

## 2023-03-13 DIAGNOSIS — Z82.79 FAMILY HISTORY OF TRISOMY 18: ICD-10-CM

## 2023-03-13 DIAGNOSIS — Z3A.16 16 WEEKS GESTATION OF PREGNANCY: ICD-10-CM

## 2023-03-13 DIAGNOSIS — O36.8990 UMBILICAL CORD CYST DURING PREGNANCY, ANTEPARTUM: ICD-10-CM

## 2023-03-13 DIAGNOSIS — Z13.89 SCREENING FOR BLOOD OR PROTEIN IN URINE: Primary | ICD-10-CM

## 2023-03-13 DIAGNOSIS — Z36.86 ENCOUNTER FOR ANTENATAL SCREENING FOR CERVICAL LENGTH: ICD-10-CM

## 2023-03-13 DIAGNOSIS — O20.9 VAGINAL BLEEDING AFFECTING EARLY PREGNANCY: ICD-10-CM

## 2023-03-13 LAB
BILIRUB BLD-MCNC: NEGATIVE MG/DL
GLUCOSE UR STRIP-MCNC: NEGATIVE MG/DL
KETONES UR QL: NEGATIVE
LEUKOCYTE EST, POC: ABNORMAL
NITRITE UR-MCNC: NEGATIVE MG/ML
PH UR: 6 [PH] (ref 5–8)
PROT UR STRIP-MCNC: NEGATIVE MG/DL
RBC # UR STRIP: NEGATIVE /UL
SP GR UR: 1.03 (ref 1–1.03)
UROBILINOGEN UR QL: NORMAL

## 2023-03-13 PROCEDURE — 99214 OFFICE O/P EST MOD 30 MIN: CPT | Performed by: OBSTETRICS & GYNECOLOGY

## 2023-03-13 PROCEDURE — 76817 TRANSVAGINAL US OBSTETRIC: CPT | Performed by: OBSTETRICS & GYNECOLOGY

## 2023-03-13 NOTE — PROGRESS NOTES
CC: OB visit  HPI: pt presents for routine visit. She reports she finished her bactrim rx last night. She denies dysuria or back pain. She denies fevers.   ROS:  Const: neg for fever  : neg for dysuria, vag bleeding/pelvic pain  O: fh= 17 cm, FHT's 146 bpm, ext: no edema or cords  U/s: appropriate growth, cervical length 4.4 cm, placenta low lying    A/p: hx recurrent UTI: symptoms resolved. Pt finished bactrim and started back on macrobid suppression.     Hx first trim bleeding: normal cervical length today, low lying placenta    Low lying placenta: reviewed with pt. Will follow.     GDM: elevated early one hour and hx so will treat empirically. Pt has not started testing. She will see diabetic educator today. Plan return 2 weeks with log. Advised pt to bring log each visit.     Hx T18: pt had low risk aneuploidy screen, plan targeted u/s and scheduled.     Hx umbilical cord cyst: not noted today, pt has targeted u/s scheduled.     Pt alpha thal carrier: partner tested and result pending. Will review at f/u visit.     rtc 2 weeks

## 2023-03-15 ENCOUNTER — HOSPITAL ENCOUNTER (OUTPATIENT)
Dept: DIABETES SERVICES | Facility: HOSPITAL | Age: 28
Discharge: HOME | End: 2023-03-15
Admitting: OBSTETRICS & GYNECOLOGY
Payer: COMMERCIAL

## 2023-03-15 PROCEDURE — G0108 DIAB MANAGE TRN  PER INDIV: HCPCS

## 2023-03-15 NOTE — NURSING NOTE
[facility]   Diabetes Management       Date:  3/15/2023    Patient:  Jessy Hope     MRN:  [id number]    Gestational age:  16w4d       Diagnosis:  Gestational Diabetes A1     Management Role:  Phase I:  Counseling and Education     Insulin dosing:   Enter insulin dosing  N/A- Goal today is begin BG checks.     Summary   The patient was seen for gestational diabetes education. Pt reports hx of GDM in last pregnancy. That pregnancy ended in FD per pt. Pt has brought new (Freestyle) BG meter. Give pt instructions for use. Pt reports not having rec'd refill lancets for meter. (This RN will call pt pharmacy.)  Meal planning and problem solving discussed.  Pt c/o regular episodes of nausea and this is limiting p.o. intake. Discuss ideas to improve intake and advise pt not to go more than four hours w/no food/snack. Patient verbalizes understanding.  Patient has been encouraged to call our office with questions or reach out to Dr. Marie. Patient has been given a blood glucose log sheet and verbalizes understanding of use.      Referring provider:  Liv Marie Md  [address] [place] [address]      Laura Peres RN, BSN, [facility][note 3]  3/15/2023  12:58 EDT

## 2023-03-16 ENCOUNTER — TELEPHONE (OUTPATIENT)
Dept: DIABETES SERVICES | Facility: HOSPITAL | Age: 28
End: 2023-03-16
Payer: COMMERCIAL

## 2023-03-20 ENCOUNTER — HOSPITAL ENCOUNTER (EMERGENCY)
Facility: HOSPITAL | Age: 28
Discharge: HOME | End: 2023-03-20
Attending: OBSTETRICS & GYNECOLOGY | Admitting: OBSTETRICS & GYNECOLOGY
Payer: COMMERCIAL

## 2023-03-20 ENCOUNTER — TELEPHONE (OUTPATIENT)
Dept: DIABETES SERVICES | Facility: HOSPITAL | Age: 28
End: 2023-03-20
Payer: COMMERCIAL

## 2023-03-20 VITALS
RESPIRATION RATE: 16 BRPM | HEART RATE: 115 BPM | SYSTOLIC BLOOD PRESSURE: 121 MMHG | DIASTOLIC BLOOD PRESSURE: 72 MMHG | TEMPERATURE: 98.8 F

## 2023-03-20 PROCEDURE — 99281 EMR DPT VST MAYX REQ PHY/QHP: CPT | Performed by: OBSTETRICS & GYNECOLOGY

## 2023-03-20 NOTE — OBED NOTES
[facility]  Jessy Hope  : 1995  MRN: [id number]  CSN: [id number]    OB ED Provider Note    Subjective   Chief Complaint   Patient presents with   • Fall     Pt fell down entire flight of stairs, rolling on her side.  Denies vaginal bleeding.  Some pain noted in her back     Jessy Hope is a 28 y.o. year old  with an Estimated Date of Delivery: 23 currently at 17w2d presenting with falling down a flight of stairs earlier today. She slipped on recently mopped stairs, then rolled down approximately 10 stairs. She had multiple points of impact including her abdomen.  She denies ROM or VB. FM has not been noted thus far in her pregnancy.    Prenatal care has been with Dr. Marie.  It has been complicated by GDM, low lying placenta, and recurrent UTI..    OB History    Para Term  AB Living   3 2 1 1 0 1   SAB IAB Ectopic Molar Multiple Live Births   0 0 0 0 0 1      # Outcome Date GA Lbr Slick/2nd Weight Sex Delivery Anes PTL Lv   3 Current            2  2018        FD      Complications: Stillborn, abnormal   1 Term 06/19/15 39w0d  3090 g (6 lb 13 oz) F Vag-Spont   ROCHELLE     Past Medical History:   Diagnosis Date   • Asthma    • Gestational diabetes      No past surgical history on file.  No current facility-administered medications for this encounter.    Allergies   Allergen Reactions   • Morphine Hives     Chest pain, SOB, hives     Social History    Tobacco Use      Smoking status: Never      Smokeless tobacco: Never    Review of Systems   Gastrointestinal: Positive for abdominal pain.   Musculoskeletal: Positive for back pain.   All other systems reviewed and are negative.        Objective   /72 (BP Location: Right arm, Patient Position: Sitting)   Pulse 115   Temp 98.8 °F (37.1 °C) (Oral)   Resp 16   LMP 2022 (Exact Date)   General: well developed; well nourished  no acute distress   Abdomen: soft, tender fundus; no masses  gravid    FHT's: 150       Cervix: was not checked.   Presentation: not appreciated   Contractions: not monitored   Chest: Unlabored respirations    CV:  Tachycardic, RR   Ext:   No C/C/E   Back: CVA tenderness is deferred bilateral        Prenatal Labs  Lab Results   Component Value Date    HGB 11.1 (L) 03/06/2023    RUBELLAABIGG 2.01 01/17/2023    HEPBSAG Negative 01/17/2023    ABORH O Positive 10/10/2018    ABSCRN Negative 02/04/2023    TFS3BWR6 Non Reactive 01/17/2023    HEPCVIRUSABY <0.1 01/17/2023     (H) 03/06/2023    URINECX >100,000 CFU/mL Escherichia coli (A) 02/28/2023    CHLAMNAA Negative 01/17/2023    NGONORRHON Negative 01/17/2023       Current Labs Reviewed   Prenatal Panel:   Lab Results   Component Value Date    HGB 11.1 (L) 03/06/2023    RUBELLAABIGG 2.01 01/17/2023    HEPBSAG Negative 01/17/2023    ABORH O Positive 10/10/2018    ABSCRN Negative 02/04/2023    TFU7SPF2 Non Reactive 01/17/2023    HEPCVIRUSABY <0.1 01/17/2023     (H) 03/06/2023    URINECX >100,000 CFU/mL Escherichia coli (A) 02/28/2023    CHLAMNAA Negative 01/17/2023    NGONORRHON Negative 01/17/2023          Assessment   1. IUP at 17w2d  2. Fall down stairs, initial evaluation- no VB, +FHT. I explained that since the fetus is previable, even if abruption occurred at this gestational age, there is nothing we can do from an obstetrical point of view.  She has no focal discomfort other than her back and fundus so declines further evaluation.     Plan   1. D/C home with instructions to return for worsening symptoms, acute changes. Keep regularly scheduled prenatal appointments.      Leonela Cunningham MD  3/20/2023  16:37 EDT

## 2023-03-29 ENCOUNTER — TELEPHONE (OUTPATIENT)
Dept: OBSTETRICS AND GYNECOLOGY | Facility: CLINIC | Age: 28
End: 2023-03-29
Payer: COMMERCIAL

## 2023-03-29 ENCOUNTER — ROUTINE PRENATAL (OUTPATIENT)
Dept: OBSTETRICS AND GYNECOLOGY | Age: 28
End: 2023-03-29
Payer: COMMERCIAL

## 2023-03-29 VITALS — DIASTOLIC BLOOD PRESSURE: 76 MMHG | SYSTOLIC BLOOD PRESSURE: 120 MMHG | WEIGHT: 258 LBS | BODY MASS INDEX: 38.1 KG/M2

## 2023-03-29 DIAGNOSIS — O23.40 URINARY TRACT INFECTION IN MOTHER DURING PREGNANCY, ANTEPARTUM: ICD-10-CM

## 2023-03-29 DIAGNOSIS — O24.410 DIET CONTROLLED GESTATIONAL DIABETES MELLITUS (GDM), ANTEPARTUM: ICD-10-CM

## 2023-03-29 DIAGNOSIS — Z3A.18 18 WEEKS GESTATION OF PREGNANCY: Primary | ICD-10-CM

## 2023-03-29 LAB
GLUCOSE UR STRIP-MCNC: NEGATIVE MG/DL
PROT UR STRIP-MCNC: NEGATIVE MG/DL

## 2023-03-29 RX ORDER — BLOOD-GLUCOSE METER
KIT MISCELLANEOUS
COMMUNITY
Start: 2023-03-08

## 2023-03-29 RX ORDER — ASPIRIN 81 MG/1
81 TABLET, CHEWABLE ORAL DAILY
Qty: 30 TABLET | Refills: 6 | Status: SHIPPED | OUTPATIENT
Start: 2023-03-29

## 2023-03-29 NOTE — PROGRESS NOTES
MATERNAL FETAL MEDICINE Consult Note    Dear Dr. Marie,     Thank you for your kind referral of Jessy Hope.  As you know, she is a 28 y.o.   at  18  6/7 weeks gestation (Estimated Date of Delivery: 23). This is a consult.      Her antepartum course is complicated by:  GDM  Hx baby w T18  Umbilical cord cyst in 1st trimester, not seen today    Aneuploidy Screening:      HPI: Today, she denies headache, blurry vision, RUQ pain. No vaginal bleeding, no contractions.     Review of History:  Past Medical History:   Diagnosis Date   • Asthma    • Gestational diabetes      History reviewed. No pertinent surgical history.      Social History     Socioeconomic History   • Marital status: Single   Tobacco Use   • Smoking status: Never     Passive exposure: Never   • Smokeless tobacco: Never   Vaping Use   • Vaping Use: Never used   Substance and Sexual Activity   • Alcohol use: Not Currently   • Drug use: Never   • Sexual activity: Yes     Partners: Male     Birth control/protection: None     Family History   Problem Relation Age of Onset   • Diabetes Mother    • Hypertension Mother    • Diabetes Father    • Hypertension Father    • Diabetes Brother    • Leukemia Brother    • Breast cancer Paternal Grandmother    • Ovarian cancer Neg Hx    • Uterine cancer Neg Hx    • Colon cancer Neg Hx       Allergies   Allergen Reactions   • Morphine Hives     Chest pain, SOB, hives      Current Outpatient Medications on File Prior to Visit   Medication Sig Dispense Refill   • Acetaminophen (TYLENOL 8 HOUR PO) Take  by mouth.     • aspirin 81 MG chewable tablet Chew 1 tablet Daily. 30 tablet 6   • Blood Glucose Monitoring Suppl (FreeStyle Lite) w/Device kit      • glucose blood test strip Use as instructed to test glucose 4 to 7 times daily 200 each 12   • glucose monitor monitoring kit 1 each As Needed (use to test glucose 4 times daily). 1 each 0   • Lancets 30G misc 1 Device 1 (One) Time As Needed (glucose  "monitoring) for up to 1 dose. For 4 to 7 times daily 200 each 12   • nitrofurantoin, macrocrystal-monohydrate, (Macrobid) 100 MG capsule Take 1 capsule by mouth Every Night. 30 capsule 7   • Prenatal Vit-Fe Sulfate-FA-DHA (Prenatal Vitamin/Min +DHA) 27-0.8-200 MG capsule Take 1 tablet by mouth Daily. 30 capsule 11   • vitamin B-6 (PYRIDOXINE) 25 MG tablet Take 1 tablet by mouth 3 (Three) Times a Day. 90 tablet 2     No current facility-administered medications on file prior to visit.        Past obstetric, gynecological, medical, surgical, family and social history reviewed.  Relevant lab work and imaging reviewed.    Review of systems  Constitutional:  denies fever, chills, malaise.   ENT/Mouth:  denies sore throat, tinnitis  Eyes: denies vision changes/pain  CV:  denies chest pain  Respiratory:  denies cough/SOB  GI:  denies N/V, diarrhea, abdominal pain.    :   denies dysuria  Skin:  denies lesions or pruritis   Neuro:  denies weakness, focal neurologic symptoms    Vitals:    23 1030   BP: 127/75   BP Location: Right arm   Patient Position: Sitting   Pulse: 104   Resp: 18   Temp: 98.4 °F (36.9 °C)   TempSrc: Temporal   Weight: 118 kg (260 lb 6.4 oz)   Height: 175.3 cm (69\")       PHYSICAL EXAM   GENERAL: Not in acute distress, AAOx3, pleasant  CARDIO: regular rate and rhythm  PULM: symmetric chest rise, speaking in complete sentences without difficulty  NEURO: awake, alert and oriented to person, place, and time  ABDOMINAL: No fundal tenderness, no rebound or guarding, gravid  EXTREMITIES: no bilateral lower extremity edema/tenderness  SKIN: Warm, well-perfused      ULTRASOUND   Please view full ultrasound note on Imaging tab in ViewPoint.  Breech presentation  Anterior placenta, not low lying.   MVP 5.2 cm, which is normal.     g (AC 62%)  All anatomy appears normal.      ASSESSMENT/COUNSELIN y.o.   at  18  6/7 weeks gestation (Estimated Date of Delivery: 23).     -Pregnancy  [ " X ] stable  [   ] improving [  ] worsening    Diagnoses and all orders for this visit:    1. Diet controlled gestational diabetes mellitus (GDM), antepartum (Primary)    Other orders  -     metFORMIN ER (GLUCOPHAGE-XR) 500 MG 24 hr tablet; Take 1 tablet by mouth Daily With Breakfast & Dinner.  Dispense: 60 tablet; Refill: 6         GDM  Counseling: I explained to her that patients with diabetes have a variety of pregnancy related risks that are related to their level of glycemic control.  We discussed at length the increased risks related to diabetes including but not limited to congenital anomalies, fetal growth disorders (FGR or LGA), indicated or spontaneous  birth, macrosomia, polyhydramnios, birth trauma,  complications (jaundice, hypoglycemia, NICU admit) and stillbirth with uncontrolled glucose.       We reviewed the results of her glycemic profiling, which reveals ok control with some mild fasting elevations.  She is on no meds. Anticipate will need to start metformin next week, so have called in so will be ready for pickup.        We discussed diet recommendations, and goal pre-prandial values below 90 and 1-hour post-prandial values below 120 to optimize her pregnancy outcome as many studies have demonstrated that this is the normal range for pregnant women without diabetes.  She will send sugars on .  We discussed meal timing and eating every 2-3 hours with 30g carbs at breakfast, 45 for lunch and dinner, and 10-15g snacks in between and at bedtime.      FH trisomy 18:  Anatomy normal today and low risk NIPT.        Recommendations:  -Needs ordered 24 hour urine, CMP, CBC, Uric Acid, LDH for baseline pre-e labs at next OB visit  -Continue ADA diet with carbohydrate counting.  Has seen diabetic counselors  -Continue glucose monitoring 6-7 times daily as instructed.  -Continue medications: likely metformin next week  -Ultrasound for fetal growth every 4 weeks  - testing at  32 weeks gestation  -Continue routine prenatal care with primary ob team   -At present goal for delivery is 39 weeks       Follow-up: No follow up with MFM scheduled, but I am happy to see for follow up at request of primary obstetrician    Thank you for the consult and opportunity to care for this patient.  Please feel free to reach out with any questions or concerns.      I spent 30 minutes caring for this patient on this date of service. This time includes time spent by me in the following activities: preparing for the visit, reviewing tests, obtaining and/or reviewing a separately obtained history, performing a medically appropriate examination and/or evaluation, counseling and educating the patient/family/caregiver and independently interpreting results and communicating that information with the patient/family/caregiver with greater than 50% spent in counseling and coordination of care.       I spent 5 minutes on the separately reported service of US imaging not included in the time used to support the E/M service also reported today.      Nayeli Woodward MD FACOG  Maternal Fetal Medicine-[facility]  Office: [phone]  [email or web address]

## 2023-03-29 NOTE — TELEPHONE ENCOUNTER
Call made to Jessy to review [facility] diabetes management. Pt currently reports checking blood sugar 4 times a fasting: fasting and 2 hours after meals. Not currently on medications for diabetes management. Reviewed with Jessy [facility] recommendations of checking blood sugar 7 times a day: before meals, one hour after meals and before a bedtime snack. Pt open to this change. [facility] message sent to patient with M glucose log attached and detailed explanation of glucose and carb intake goals with meals. Will discuss more at appointment.  Encouraged pt to start checking sugars 7 times a day before appointment with Dr. Woodward and to bring in her current glucose logs for evaluation. Pt agreeable. Will provide pt with [facility] diabetes folder with diabetes education, hypoglycemia packet and additional glucose logs at upcoming appointment. Jessy reports she has already met with Diabetic Education and had a good discussion with that team. Encouraged Jessy to call [facility] office with any questions prior to her appointment. [facility] looks forward to seeing patient on 3/31 at 10AM.

## 2023-03-29 NOTE — PROGRESS NOTES
CC: ob visit  HPI: Pt presents for routine OB visit. She denies any issues today. She denies dysuria or vag bleeding.   ROS:  : neg for dysuria or vag bleeding  GI: pos for GERD, neg for n/v  O: fh= 19 cm, FHT's 154 bpm; ext: no edema or cords    A/p: GDM: reviewed log and about 1/2 of fasting values are elevated and most pp near range but several over. Discussed diet education and pt denies any questions. Placed referral to [facility] for diabetic management as insulin can be considered with current values and will likely need as pregnancy progresses. Pt has appt this week and notes understanding. Recommend baby asa daily for preeclampsia prevention given risks of early GDM and increased BMI    18 weeks: requested delivery records from  for review    Hx UTI: pt finished course of bactrim and is taking macrobid prophylaxis, ucx requested today. Pyelo warnings reviewed    Hx trisomy 18 and umbilical cord cyst first trim: pt has targeted u/s and consult this week.     Low lying placenta: pt denies bleeding, has f/u u/s this week    Alpha thal carrier: spouse is neg    Plan f/u 2 weeks or prn.

## 2023-03-31 ENCOUNTER — OFFICE VISIT (OUTPATIENT)
Dept: OBSTETRICS AND GYNECOLOGY | Facility: CLINIC | Age: 28
End: 2023-03-31
Payer: COMMERCIAL

## 2023-03-31 ENCOUNTER — HOSPITAL ENCOUNTER (OUTPATIENT)
Dept: ULTRASOUND IMAGING | Facility: HOSPITAL | Age: 28
Discharge: HOME | End: 2023-03-31
Admitting: OBSTETRICS & GYNECOLOGY
Payer: COMMERCIAL

## 2023-03-31 VITALS
DIASTOLIC BLOOD PRESSURE: 75 MMHG | TEMPERATURE: 98.4 F | WEIGHT: 260.4 LBS | BODY MASS INDEX: 38.57 KG/M2 | HEART RATE: 104 BPM | HEIGHT: 69 IN | SYSTOLIC BLOOD PRESSURE: 127 MMHG | RESPIRATION RATE: 18 BRPM

## 2023-03-31 DIAGNOSIS — Z82.79 FAMILY HISTORY OF TRISOMY 18: ICD-10-CM

## 2023-03-31 DIAGNOSIS — O24.410 DIET CONTROLLED GESTATIONAL DIABETES MELLITUS (GDM), ANTEPARTUM: Primary | ICD-10-CM

## 2023-03-31 DIAGNOSIS — O36.8990 UMBILICAL CORD CYST DURING PREGNANCY, ANTEPARTUM: ICD-10-CM

## 2023-03-31 LAB
BACTERIA UR CULT: NORMAL
BACTERIA UR CULT: NORMAL
GLUCOSE BLDC GLUCOMTR-MCNC: 159 MG/DL (ref 70–130)

## 2023-03-31 PROCEDURE — 76811 OB US DETAILED SNGL FETUS: CPT | Performed by: OBSTETRICS & GYNECOLOGY

## 2023-03-31 PROCEDURE — 76811 OB US DETAILED SNGL FETUS: CPT

## 2023-03-31 PROCEDURE — 82962 GLUCOSE BLOOD TEST: CPT

## 2023-03-31 RX ORDER — METFORMIN HYDROCHLORIDE 500 MG/1
500 TABLET, EXTENDED RELEASE ORAL
Qty: 60 TABLET | Refills: 6 | Status: SHIPPED | OUTPATIENT
Start: 2023-03-31

## 2023-03-31 NOTE — PROGRESS NOTES
Pt reports that she is doing well and denies vaginal bleeding, cramping, contractions or LOF at this time. Reports active fetal movement. Reviewed when to call OB office or present to L&D for evaluation with symptoms such as decreased fetal movement, vaginal bleeding, LOF or ctxs. Pt verbalized understanding. Denies HA, visual changes or epigastric pain. Denies any additional complaints at time of appointment. Next OB appointment scheduled for 04/14    Vitals:    03/31/23 1030   BP: 127/75   Pulse: 104   Resp: 18   Temp: 98.4 °F (36.9 °C)

## 2023-03-31 NOTE — PROGRESS NOTES
Pt given MFM folder with diabetes packet, hypoglycemia packet and extra glucose logs. Reviewed eating schedule and carb goals with patient. Jessy reports it is hard for her to eat 3 meals a day + snacks d/t feeling nausea and most times not having an appetite. Encouraged compliance with meal timing/snacks as much as possible for best glucose control. Not currently on medications at this time. Glucose logs brought to appointment for Dr. Woodward to review. Pt has continued 4 times a day pattern: fasting and 2 hour PP. Reviewed MFM glucose logs and checking blood sugar 7 times a day: before meals, one hour after meals and before bedtime snack.

## 2023-04-05 ENCOUNTER — TELEPHONE (OUTPATIENT)
Dept: OBSTETRICS AND GYNECOLOGY | Facility: CLINIC | Age: 28
End: 2023-04-05
Payer: COMMERCIAL

## 2023-04-05 DIAGNOSIS — O24.419 GESTATIONAL DIABETES MELLITUS (GDM), ANTEPARTUM, GESTATIONAL DIABETES METHOD OF CONTROL UNSPECIFIED: Primary | ICD-10-CM

## 2023-04-05 NOTE — TELEPHONE ENCOUNTER
Call made to Jessy after weekly glucose log evaluation. Pt not on any medication at this time. Per Dr. Woodward's recommendations pt to start taking Metformin XR 500mg with breakfast and dinner. Pt in agreement with plan.   Pt notes that she saw diabetic education but has not had insulin teaching and hands on learning with insulin pens or drawing up insulin into syringes. Will place additional referral for insulin teaching. Pt aware. Will review blood sugar logs next on 4/12.

## 2023-04-07 ENCOUNTER — TELEPHONE (OUTPATIENT)
Dept: DIABETES SERVICES | Facility: HOSPITAL | Age: 28
End: 2023-04-07
Payer: COMMERCIAL

## 2023-04-14 ENCOUNTER — ROUTINE PRENATAL (OUTPATIENT)
Dept: OBSTETRICS AND GYNECOLOGY | Age: 28
End: 2023-04-14
Payer: COMMERCIAL

## 2023-04-14 ENCOUNTER — HOSPITAL ENCOUNTER (OUTPATIENT)
Dept: DIABETES SERVICES | Facility: HOSPITAL | Age: 28
Discharge: HOME | End: 2023-04-14
Admitting: OBSTETRICS & GYNECOLOGY
Payer: COMMERCIAL

## 2023-04-14 VITALS — DIASTOLIC BLOOD PRESSURE: 78 MMHG | WEIGHT: 256.6 LBS | SYSTOLIC BLOOD PRESSURE: 122 MMHG | BODY MASS INDEX: 37.89 KG/M2

## 2023-04-14 DIAGNOSIS — Z3A.20 20 WEEKS GESTATION OF PREGNANCY: ICD-10-CM

## 2023-04-14 DIAGNOSIS — O24.410 DIET CONTROLLED GESTATIONAL DIABETES MELLITUS (GDM), ANTEPARTUM: Primary | ICD-10-CM

## 2023-04-14 LAB
GLUCOSE UR STRIP-MCNC: NEGATIVE MG/DL
PROT UR STRIP-MCNC: NEGATIVE MG/DL

## 2023-04-14 PROCEDURE — G0108 DIAB MANAGE TRN  PER INDIV: HCPCS

## 2023-04-14 NOTE — NURSING NOTE
[facility]   Diabetes Management       Date:  2023    Patient:  Jessy Hope     MRN:  [id number]    Gestational age:  20w6d       Diagnosis:  Gestational Diabetes A2    Management Role:  Phase III: Education, Glucose monitoring, Insulin management by MFM    Insulin dosing:   N/a- no insulin rxs found.     Summary   29 y/o returned to [facility] office for insulin instructions. This pt known to this RN from previous appointment for ed. Pt reports not checking BGs for ~ one week citing stressors at home. Provide active listening. Discuss importance of BG checks to guide tx. Pt reports taking metformin but inconsistently. Explain role of DM medications in pregnancy.   Demo to pt how to draw up insulin from a vial as well as correct use of insulin pens. Pt able to teach back w/demo insulin pen. Pt reports that both her mother and her grandmother take insulin injections. Pt's dtr has learned how to assist pt's mother with insulin administration. Encourage pt to utilize resources and/or family assistance to manage stress at this time.   Pt c/o decreased appetite and describes inconsistent meal schedule and snacking. Review importance of meal planning and encourage pt not to wait longer than 4 hrs w/no meal/snack. Pt verbalizes understanding.   Encourage pt to reach out to me for follow up questions.     Referring provider:  Nayeli Woodward Md  [address] [address]      Laura Peres, RN, BSN, [facility]  2023  15:52 EDT

## 2023-04-14 NOTE — PROGRESS NOTES
CC: OB visit  HPI: pt presents for OB visit. She did not bring her log and notes she has not been monitoring her glucose values. She also notes she is not taking metformin. She denies bleeding/leaking fluid. She has felt fetal movement. She missed her diabetes education appt.     ROS:  Neuro: neg for headache  GI: neg for abd pain  : neg for bleeding/pelvic pain  O: fh= 21 cm, FHT's 134 bpm; ext: no edema or cords    A/p: GDM: reviewed importance of compliance with GDM testing and management. Discussed risk of FDIU increased with poorly controlled diabetes. Pt notes understanding. Forwarded message to  to r/s diabetes education. Recommend she start monitoring glucose values and bring log to each appt for review. Recommend compliance with metformin given by [facility]. F/u 2 weeks.     Baseline preeclamptic labs recommended by [facility] and ordered. Pt instructed on 24 hour urine. She reports she is taking baby asa daily.    Hx umbilical cord cyst and T18 prior preg: normal targeted u/s    Hx UTI: pt notes she is taking macrobid nightly. ucx was normal last visit

## 2023-04-15 LAB
ALBUMIN SERPL-MCNC: 4.2 G/DL (ref 3.9–5)
ALBUMIN/GLOB SERPL: 1.5 {RATIO} (ref 1.2–2.2)
ALP SERPL-CCNC: 51 IU/L (ref 44–121)
ALT SERPL-CCNC: 19 IU/L (ref 0–32)
AST SERPL-CCNC: 14 IU/L (ref 0–40)
BASOPHILS # BLD AUTO: 0 X10E3/UL (ref 0–0.2)
BASOPHILS NFR BLD AUTO: 0 %
BILIRUB SERPL-MCNC: 0.3 MG/DL (ref 0–1.2)
BUN SERPL-MCNC: 8 MG/DL (ref 6–20)
BUN/CREAT SERPL: 14 (ref 9–23)
CALCIUM SERPL-MCNC: 9.3 MG/DL (ref 8.7–10.2)
CHLORIDE SERPL-SCNC: 102 MMOL/L (ref 96–106)
CO2 SERPL-SCNC: 21 MMOL/L (ref 20–29)
CREAT SERPL-MCNC: 0.57 MG/DL (ref 0.57–1)
EGFRCR SERPLBLD CKD-EPI 2021: 128 ML/MIN/1.73
EOSINOPHIL # BLD AUTO: 0.1 X10E3/UL (ref 0–0.4)
EOSINOPHIL NFR BLD AUTO: 1 %
ERYTHROCYTE [DISTWIDTH] IN BLOOD BY AUTOMATED COUNT: 12.1 % (ref 11.7–15.4)
GLOBULIN SER CALC-MCNC: 2.8 G/DL (ref 1.5–4.5)
GLUCOSE SERPL-MCNC: 83 MG/DL (ref 70–99)
HCT VFR BLD AUTO: 36.4 % (ref 34–46.6)
HGB BLD-MCNC: 11.8 G/DL (ref 11.1–15.9)
IMM GRANULOCYTES # BLD AUTO: 0 X10E3/UL (ref 0–0.1)
IMM GRANULOCYTES NFR BLD AUTO: 1 %
LDH SERPL L TO P-CCNC: 170 IU/L (ref 119–226)
LYMPHOCYTES # BLD AUTO: 1.4 X10E3/UL (ref 0.7–3.1)
LYMPHOCYTES NFR BLD AUTO: 18 %
MCH RBC QN AUTO: 27.7 PG (ref 26.6–33)
MCHC RBC AUTO-ENTMCNC: 32.4 G/DL (ref 31.5–35.7)
MCV RBC AUTO: 85 FL (ref 79–97)
MONOCYTES # BLD AUTO: 0.5 X10E3/UL (ref 0.1–0.9)
MONOCYTES NFR BLD AUTO: 6 %
NEUTROPHILS # BLD AUTO: 6.2 X10E3/UL (ref 1.4–7)
NEUTROPHILS NFR BLD AUTO: 74 %
PLATELET # BLD AUTO: 374 X10E3/UL (ref 150–450)
POTASSIUM SERPL-SCNC: 3.7 MMOL/L (ref 3.5–5.2)
PROT SERPL-MCNC: 7 G/DL (ref 6–8.5)
PROT UR-MCNC: NORMAL MG/DL
RBC # BLD AUTO: 4.26 X10E6/UL (ref 3.77–5.28)
SODIUM SERPL-SCNC: 137 MMOL/L (ref 134–144)
SPECIMEN STATUS: NORMAL
URATE SERPL-MCNC: 3.5 MG/DL (ref 2.6–6.2)
WBC # BLD AUTO: 8.2 X10E3/UL (ref 3.4–10.8)

## 2023-04-17 ENCOUNTER — DOCUMENTATION (OUTPATIENT)
Dept: OBSTETRICS AND GYNECOLOGY | Facility: CLINIC | Age: 28
End: 2023-04-17
Payer: COMMERCIAL

## 2023-04-19 LAB
PROT 24H UR-MRATE: 78.9 MG/24HOURS (ref 0–150)
PROT UR-MCNC: 13.6 MG/DL

## 2023-04-27 NOTE — PROGRESS NOTES
MATERNAL FETAL MEDICINE Consult Note    Dear Dr. Marie,     Thank you for your kind referral of Jessy Hope.  As you know, she is a 28 y.o.   at  22  6/7 weeks gestation (Estimated Date of Delivery: 23). This is a consult.      Her antepartum course is complicated by:  GDM  Hx baby w T18  Umbilical cord cyst in 1st trimester, not seen today    HPI: Today, she denies headache, blurry vision, RUQ pain. No vaginal bleeding, no contractions.     Review of History:  Past Medical History:   Diagnosis Date   • Asthma    • Gestational diabetes      History reviewed. No pertinent surgical history.      Social History     Socioeconomic History   • Marital status: Single   Tobacco Use   • Smoking status: Never     Passive exposure: Never   • Smokeless tobacco: Never   Vaping Use   • Vaping Use: Never used   Substance and Sexual Activity   • Alcohol use: Not Currently   • Drug use: Never   • Sexual activity: Yes     Partners: Male     Birth control/protection: None     Family History   Problem Relation Age of Onset   • Diabetes Mother    • Hypertension Mother    • Diabetes Father    • Hypertension Father    • Diabetes Brother    • Leukemia Brother    • Breast cancer Paternal Grandmother    • Ovarian cancer Neg Hx    • Uterine cancer Neg Hx    • Colon cancer Neg Hx       Allergies   Allergen Reactions   • Morphine Hives     Chest pain, SOB, hives      Current Outpatient Medications on File Prior to Visit   Medication Sig Dispense Refill   • Acetaminophen (TYLENOL 8 HOUR PO) Take  by mouth.     • aspirin 81 MG chewable tablet Chew 1 tablet Daily. 30 tablet 6   • Blood Glucose Monitoring Suppl (FreeStyle Lite) w/Device kit      • glucose blood test strip Use as instructed to test glucose 4 to 7 times daily 200 each 12   • glucose monitor monitoring kit 1 each As Needed (use to test glucose 4 times daily). 1 each 0   • Lancets 30G misc 1 Device 1 (One) Time As Needed (glucose monitoring) for up to 1 dose. For 4  "to 7 times daily 200 each 12   • metFORMIN ER (GLUCOPHAGE-XR) 500 MG 24 hr tablet Take 1 tablet by mouth Daily With Breakfast & Dinner. 60 tablet 6   • nitrofurantoin, macrocrystal-monohydrate, (Macrobid) 100 MG capsule Take 1 capsule by mouth Every Night. 30 capsule 7   • Prenatal Vit-Fe Sulfate-FA-DHA (Prenatal Vitamin/Min +DHA) 27-0.8-200 MG capsule Take 1 tablet by mouth Daily. 30 capsule 11   • vitamin B-6 (PYRIDOXINE) 25 MG tablet Take 1 tablet by mouth 3 (Three) Times a Day. 90 tablet 2     No current facility-administered medications on file prior to visit.        Past obstetric, gynecological, medical, surgical, family and social history reviewed.  Relevant lab work and imaging reviewed.    Review of systems  Constitutional:  denies fever, chills, malaise.   ENT/Mouth:  denies sore throat, tinnitis  Eyes: denies vision changes/pain  CV:  denies chest pain  Respiratory:  denies cough/SOB  GI:  denies N/V, diarrhea, abdominal pain.    :   denies dysuria  Skin:  denies lesions or pruritis   Neuro:  denies weakness, focal neurologic symptoms    Vitals:    23 1022   BP: 118/65   BP Location: Right arm   Patient Position: Sitting   Pulse: 97   Resp: 16   Temp: 98 °F (36.7 °C)   TempSrc: Temporal   Weight: 118 kg (260 lb 6.4 oz)   Height: 175.3 cm (69\")       PHYSICAL EXAM   GENERAL: Not in acute distress, AAOx3, pleasant  CARDIO: regular rate and rhythm  PULM: symmetric chest rise, speaking in complete sentences without difficulty  NEURO: awake, alert and oriented to person, place, and time  ABDOMINAL: No fundal tenderness, no rebound or guarding, gravid  EXTREMITIES: no bilateral lower extremity edema/tenderness  SKIN: Warm, well-perfused      ULTRASOUND   Please view full ultrasound note on Imaging tab in ViewPoint.  Cephalic presentation.  Anterior placenta.   g (37%, AC 46%)  MVP 5.9 cm, which is normal.  All anatomy appears normal.      ASSESSMENT/COUNSELIN y.o.   at  22  6/7 " weeks gestation (Estimated Date of Delivery: 23).     -Pregnancy  [ X ] stable  [   ] improving [  ] worsening    Diagnoses and all orders for this visit:    1. Gestational diabetes mellitus (GDM), antepartum, gestational diabetes method of control unspecified (Primary)         GDM  Previously counseled:  Counseling: I explained to her that patients with diabetes have a variety of pregnancy related risks that are related to their level of glycemic control.  We discussed at length the increased risks related to diabetes including but not limited to congenital anomalies, fetal growth disorders (FGR or LGA), indicated or spontaneous  birth, macrosomia, polyhydramnios, birth trauma,  complications (jaundice, hypoglycemia, NICU admit) and stillbirth with uncontrolled glucose.       She did not bring her most recent log.  She is on metformin 1000 mg BID.  We may need to add glyburide or talk about insulin depending on those numbers.  Encouraged to eat snacks and eat appropriately or we cannot appropriately treat her sugars      We discussed diet recommendations, and goal pre-prandial values below 90 and 1-hour post-prandial values below 120 to optimize her pregnancy outcome as many studies have demonstrated that this is the normal range for pregnant women without diabetes.  She will send sugars on .  We discussed meal timing and eating every 2-3 hours with 30g carbs at breakfast, 45 for lunch and dinner, and 10-15g snacks in between and at bedtime.      FH trisomy 18:  Anatomy normal today and low risk NIPT.        Recommendations:  -PIH baseline labs noted and normal  -Continue ADA diet with carbohydrate counting.  Has seen diabetic counselors  -Continue glucose monitoring 6-7 times daily as instructed.  -Continue medications: on Metformin  -Ultrasound for fetal growth every 4 weeks  - testing at 32 weeks gestation  -Continue routine prenatal care with primary ob team   -At present  goal for delivery is 39 weeks       Follow-up: 4 weeks growth, send sugars weekly.     Thank you for the consult and opportunity to care for this patient.  Please feel free to reach out with any questions or concerns.      I spent 15 minutes caring for this patient on this date of service. This time includes time spent by me in the following activities: preparing for the visit, reviewing tests, obtaining and/or reviewing a separately obtained history, performing a medically appropriate examination and/or evaluation, counseling and educating the patient/family/caregiver and independently interpreting results and communicating that information with the patient/family/caregiver with greater than 50% spent in counseling and coordination of care.       I spent 5 minutes on the separately reported service of US imaging not included in the time used to support the E/M service also reported today.      Nayeli Woodward MD FACOG  Maternal Fetal Medicine-[facility]  Office: [phone]  [email or web address]

## 2023-04-28 ENCOUNTER — OFFICE VISIT (OUTPATIENT)
Dept: OBSTETRICS AND GYNECOLOGY | Facility: CLINIC | Age: 28
End: 2023-04-28
Payer: COMMERCIAL

## 2023-04-28 ENCOUNTER — TRANSCRIBE ORDERS (OUTPATIENT)
Dept: ULTRASOUND IMAGING | Facility: HOSPITAL | Age: 28
End: 2023-04-28
Payer: COMMERCIAL

## 2023-04-28 ENCOUNTER — HOSPITAL ENCOUNTER (OUTPATIENT)
Dept: ULTRASOUND IMAGING | Facility: HOSPITAL | Age: 28
Discharge: HOME | End: 2023-04-28
Payer: COMMERCIAL

## 2023-04-28 VITALS
HEART RATE: 97 BPM | BODY MASS INDEX: 38.57 KG/M2 | RESPIRATION RATE: 16 BRPM | TEMPERATURE: 98 F | DIASTOLIC BLOOD PRESSURE: 65 MMHG | WEIGHT: 260.4 LBS | SYSTOLIC BLOOD PRESSURE: 118 MMHG | HEIGHT: 69 IN

## 2023-04-28 DIAGNOSIS — O24.410 DIET CONTROLLED GESTATIONAL DIABETES MELLITUS (GDM), ANTEPARTUM: Primary | ICD-10-CM

## 2023-04-28 DIAGNOSIS — O24.419 GESTATIONAL DIABETES MELLITUS (GDM), ANTEPARTUM, GESTATIONAL DIABETES METHOD OF CONTROL UNSPECIFIED: Primary | ICD-10-CM

## 2023-04-28 LAB — GLUCOSE BLDC GLUCOMTR-MCNC: 100 MG/DL (ref 70–130)

## 2023-04-28 PROCEDURE — 82948 REAGENT STRIP/BLOOD GLUCOSE: CPT

## 2023-04-28 PROCEDURE — 76816 OB US FOLLOW-UP PER FETUS: CPT

## 2023-04-28 NOTE — LETTER
2023     Liv Marie MD  [address] [facility] [address]    Patient: Jessy Hope   YOB: 1995   Date of Visit: 2023       Dear Liv Marie MD,    Thank you for referring Jessy Hope to me for evaluation. Below is a copy of my consult note.    If you have questions, please do not hesitate to call me. I look forward to following Jessy along with you.         Sincerely,        Nayeli Woodward MD    MATERNAL FETAL MEDICINE Consult Note    Dear Dr. Marie,     Thank you for your kind referral of Jessy Hope.  As you know, she is a 28 y.o.   at  22  6/7 weeks gestation (Estimated Date of Delivery: 23). This is a consult.      Her antepartum course is complicated by:  GDM  Hx baby w T18  Umbilical cord cyst in 1st trimester, not seen today    HPI: Today, she denies headache, blurry vision, RUQ pain. No vaginal bleeding, no contractions.     Review of History:  Past Medical History:   Diagnosis Date   • Asthma    • Gestational diabetes      History reviewed. No pertinent surgical history.      Social History     Socioeconomic History   • Marital status: Single   Tobacco Use   • Smoking status: Never     Passive exposure: Never   • Smokeless tobacco: Never   Vaping Use   • Vaping Use: Never used   Substance and Sexual Activity   • Alcohol use: Not Currently   • Drug use: Never   • Sexual activity: Yes     Partners: Male     Birth control/protection: None     Family History   Problem Relation Age of Onset   • Diabetes Mother    • Hypertension Mother    • Diabetes Father    • Hypertension Father    • Diabetes Brother    • Leukemia Brother    • Breast cancer Paternal Grandmother    • Ovarian cancer Neg Hx    • Uterine cancer Neg Hx    • Colon cancer Neg Hx       Allergies   Allergen Reactions   • Morphine Hives     Chest pain, SOB, hives      Current Outpatient Medications on File Prior to Visit   Medication Sig Dispense Refill   • Acetaminophen  "(TYLENOL 8 HOUR PO) Take  by mouth.     • aspirin 81 MG chewable tablet Chew 1 tablet Daily. 30 tablet 6   • Blood Glucose Monitoring Suppl (FreeStyle Lite) w/Device kit      • glucose blood test strip Use as instructed to test glucose 4 to 7 times daily 200 each 12   • glucose monitor monitoring kit 1 each As Needed (use to test glucose 4 times daily). 1 each 0   • Lancets 30G misc 1 Device 1 (One) Time As Needed (glucose monitoring) for up to 1 dose. For 4 to 7 times daily 200 each 12   • metFORMIN ER (GLUCOPHAGE-XR) 500 MG 24 hr tablet Take 1 tablet by mouth Daily With Breakfast & Dinner. 60 tablet 6   • nitrofurantoin, macrocrystal-monohydrate, (Macrobid) 100 MG capsule Take 1 capsule by mouth Every Night. 30 capsule 7   • Prenatal Vit-Fe Sulfate-FA-DHA (Prenatal Vitamin/Min +DHA) 27-0.8-200 MG capsule Take 1 tablet by mouth Daily. 30 capsule 11   • vitamin B-6 (PYRIDOXINE) 25 MG tablet Take 1 tablet by mouth 3 (Three) Times a Day. 90 tablet 2     No current facility-administered medications on file prior to visit.        Past obstetric, gynecological, medical, surgical, family and social history reviewed.  Relevant lab work and imaging reviewed.    Review of systems  Constitutional:  denies fever, chills, malaise.   ENT/Mouth:  denies sore throat, tinnitis  Eyes: denies vision changes/pain  CV:  denies chest pain  Respiratory:  denies cough/SOB  GI:  denies N/V, diarrhea, abdominal pain.    :   denies dysuria  Skin:  denies lesions or pruritis   Neuro:  denies weakness, focal neurologic symptoms    Vitals:    04/28/23 1022   BP: 118/65   BP Location: Right arm   Patient Position: Sitting   Pulse: 97   Resp: 16   Temp: 98 °F (36.7 °C)   TempSrc: Temporal   Weight: 118 kg (260 lb 6.4 oz)   Height: 175.3 cm (69\")       PHYSICAL EXAM   GENERAL: Not in acute distress, AAOx3, pleasant  CARDIO: regular rate and rhythm  PULM: symmetric chest rise, speaking in complete sentences without difficulty  NEURO: awake, alert " and oriented to person, place, and time  ABDOMINAL: No fundal tenderness, no rebound or guarding, gravid  EXTREMITIES: no bilateral lower extremity edema/tenderness  SKIN: Warm, well-perfused      ULTRASOUND   Please view full ultrasound note on Imaging tab in ViewPoint.  Cephalic presentation.  Anterior placenta.   g (37%, AC 46%)  MVP 5.9 cm, which is normal.  All anatomy appears normal.      ASSESSMENT/COUNSELIN y.o.   at  22  6/7 weeks gestation (Estimated Date of Delivery: 23).     -Pregnancy  [ X ] stable  [   ] improving [  ] worsening    Diagnoses and all orders for this visit:    1. Gestational diabetes mellitus (GDM), antepartum, gestational diabetes method of control unspecified (Primary)         GDM  Previously counseled:  Counseling: I explained to her that patients with diabetes have a variety of pregnancy related risks that are related to their level of glycemic control.  We discussed at length the increased risks related to diabetes including but not limited to congenital anomalies, fetal growth disorders (FGR or LGA), indicated or spontaneous  birth, macrosomia, polyhydramnios, birth trauma,  complications (jaundice, hypoglycemia, NICU admit) and stillbirth with uncontrolled glucose.       She did not bring her most recent log.  She is on metformin 1000 mg BID.  We may need to add glyburide or talk about insulin depending on those numbers.  Encouraged to eat snacks and eat appropriately or we cannot appropriately treat her sugars      We discussed diet recommendations, and goal pre-prandial values below 90 and 1-hour post-prandial values below 120 to optimize her pregnancy outcome as many studies have demonstrated that this is the normal range for pregnant women without diabetes.  She will send sugars on .  We discussed meal timing and eating every 2-3 hours with 30g carbs at breakfast, 45 for lunch and dinner, and 10-15g snacks in between and at  bedtime.      FH trisomy 18:  Anatomy normal today and low risk NIPT.        Recommendations:  -PIH baseline labs noted and normal  -Continue ADA diet with carbohydrate counting.  Has seen diabetic counselors  -Continue glucose monitoring 6-7 times daily as instructed.  -Continue medications: on Metformin  -Ultrasound for fetal growth every 4 weeks  - testing at 32 weeks gestation  -Continue routine prenatal care with primary ob team   -At present goal for delivery is 39 weeks       Follow-up: 4 weeks growth, send sugars weekly.     Thank you for the consult and opportunity to care for this patient.  Please feel free to reach out with any questions or concerns.      I spent 15 minutes caring for this patient on this date of service. This time includes time spent by me in the following activities: preparing for the visit, reviewing tests, obtaining and/or reviewing a separately obtained history, performing a medically appropriate examination and/or evaluation, counseling and educating the patient/family/caregiver and independently interpreting results and communicating that information with the patient/family/caregiver with greater than 50% spent in counseling and coordination of care.       I spent 5 minutes on the separately reported service of US imaging not included in the time used to support the E/M service also reported today.      Nayeli Woodward MD FACOG  Maternal Fetal Medicine-[facility]  Office: [phone]  [email or web address]

## 2023-04-28 NOTE — PROGRESS NOTES
Pt reports that she is doing well and denies vaginal bleeding, cramping, contractions or LOF at this time. Reports active fetal movement. Reviewed when to call OB office or present to L&D for evaluation with symptoms such as decreased fetal movement, vaginal bleeding, LOF or ctxs. Pt verbalized understanding. Denies HA, visual changes or epigastric pain. Denies any additional complaints at time of appointment. Next OB appointment scheduled for 05/01    Vitals:    04/28/23 1022   BP: 118/65   Pulse: 97   Resp: 16   Temp: 98 °F (36.7 °C)

## 2023-05-05 ENCOUNTER — TELEPHONE (OUTPATIENT)
Dept: OBSTETRICS AND GYNECOLOGY | Age: 28
End: 2023-05-05
Payer: COMMERCIAL

## 2023-05-05 RX ORDER — FAMOTIDINE 20 MG/1
20 TABLET, FILM COATED ORAL 2 TIMES DAILY
Qty: 30 TABLET | Refills: 5 | Status: SHIPPED | OUTPATIENT
Start: 2023-05-05 | End: 2024-05-04

## 2023-05-05 RX ORDER — ONDANSETRON 4 MG/1
4 TABLET, ORALLY DISINTEGRATING ORAL EVERY 6 HOURS PRN
Qty: 60 TABLET | Refills: 5 | Status: SHIPPED | OUTPATIENT
Start: 2023-05-05 | End: 2023-08-28

## 2023-05-05 NOTE — TELEPHONE ENCOUNTER
Please scheduled pt for next available visit, OK with NP. She will also need visit with me the week I return from vacation.

## 2023-05-08 ENCOUNTER — HOSPITAL ENCOUNTER (EMERGENCY)
Facility: HOSPITAL | Age: 28
Discharge: HOME | End: 2023-05-08
Attending: OBSTETRICS & GYNECOLOGY | Admitting: OBSTETRICS & GYNECOLOGY
Payer: COMMERCIAL

## 2023-05-08 VITALS
OXYGEN SATURATION: 99 % | SYSTOLIC BLOOD PRESSURE: 119 MMHG | TEMPERATURE: 98.6 F | HEART RATE: 93 BPM | DIASTOLIC BLOOD PRESSURE: 77 MMHG | RESPIRATION RATE: 16 BRPM

## 2023-05-08 LAB
ALBUMIN SERPL-MCNC: 3.7 G/DL (ref 3.5–5.2)
ALBUMIN/GLOB SERPL: 1.4 G/DL
ALP SERPL-CCNC: 47 U/L (ref 39–117)
ALT SERPL W P-5'-P-CCNC: 21 U/L (ref 1–33)
ANION GAP SERPL CALCULATED.3IONS-SCNC: 9.3 MMOL/L (ref 5–15)
AST SERPL-CCNC: 16 U/L (ref 1–32)
BACTERIA UR QL AUTO: ABNORMAL /HPF
BASOPHILS # BLD AUTO: 0.01 10*3/MM3 (ref 0–0.2)
BASOPHILS NFR BLD AUTO: 0.1 % (ref 0–1.5)
BILIRUB SERPL-MCNC: 0.3 MG/DL (ref 0–1.2)
BILIRUB UR QL STRIP: NEGATIVE
BUN SERPL-MCNC: 7 MG/DL (ref 6–20)
BUN/CREAT SERPL: 14.9 (ref 7–25)
CALCIUM SPEC-SCNC: 8.7 MG/DL (ref 8.6–10.5)
CHLORIDE SERPL-SCNC: 106 MMOL/L (ref 98–107)
CLARITY UR: ABNORMAL
CO2 SERPL-SCNC: 20.7 MMOL/L (ref 22–29)
COLOR UR: YELLOW
CREAT SERPL-MCNC: 0.47 MG/DL (ref 0.57–1)
DEPRECATED RDW RBC AUTO: 39.2 FL (ref 37–54)
EGFRCR SERPLBLD CKD-EPI 2021: 134 ML/MIN/1.73
EOSINOPHIL # BLD AUTO: 0.1 10*3/MM3 (ref 0–0.4)
EOSINOPHIL NFR BLD AUTO: 1.1 % (ref 0.3–6.2)
ERYTHROCYTE [DISTWIDTH] IN BLOOD BY AUTOMATED COUNT: 12.3 % (ref 12.3–15.4)
GLOBULIN UR ELPH-MCNC: 2.6 GM/DL
GLUCOSE SERPL-MCNC: 108 MG/DL (ref 65–99)
GLUCOSE UR STRIP-MCNC: NEGATIVE MG/DL
HCT VFR BLD AUTO: 34.5 % (ref 34–46.6)
HGB BLD-MCNC: 11.3 G/DL (ref 12–15.9)
HGB UR QL STRIP.AUTO: NEGATIVE
HYALINE CASTS UR QL AUTO: ABNORMAL /LPF
IMM GRANULOCYTES # BLD AUTO: 0.04 10*3/MM3 (ref 0–0.05)
IMM GRANULOCYTES NFR BLD AUTO: 0.4 % (ref 0–0.5)
KETONES UR QL STRIP: ABNORMAL
LEUKOCYTE ESTERASE UR QL STRIP.AUTO: ABNORMAL
LYMPHOCYTES # BLD AUTO: 1.28 10*3/MM3 (ref 0.7–3.1)
LYMPHOCYTES NFR BLD AUTO: 14.3 % (ref 19.6–45.3)
MCH RBC QN AUTO: 28.1 PG (ref 26.6–33)
MCHC RBC AUTO-ENTMCNC: 32.8 G/DL (ref 31.5–35.7)
MCV RBC AUTO: 85.8 FL (ref 79–97)
MONOCYTES # BLD AUTO: 0.5 10*3/MM3 (ref 0.1–0.9)
MONOCYTES NFR BLD AUTO: 5.6 % (ref 5–12)
NEUTROPHILS NFR BLD AUTO: 7.01 10*3/MM3 (ref 1.7–7)
NEUTROPHILS NFR BLD AUTO: 78.5 % (ref 42.7–76)
NITRITE UR QL STRIP: POSITIVE
NRBC BLD AUTO-RTO: 0 /100 WBC (ref 0–0.2)
PH UR STRIP.AUTO: 6.5 [PH] (ref 5–8)
PLATELET # BLD AUTO: 322 10*3/MM3 (ref 140–450)
PMV BLD AUTO: 8.8 FL (ref 6–12)
POTASSIUM SERPL-SCNC: 3.4 MMOL/L (ref 3.5–5.2)
PROT SERPL-MCNC: 6.3 G/DL (ref 6–8.5)
PROT UR QL STRIP: ABNORMAL
RBC # BLD AUTO: 4.02 10*6/MM3 (ref 3.77–5.28)
RBC # UR STRIP: ABNORMAL /HPF
REF LAB TEST METHOD: ABNORMAL
SODIUM SERPL-SCNC: 136 MMOL/L (ref 136–145)
SP GR UR STRIP: 1.02 (ref 1–1.03)
SQUAMOUS #/AREA URNS HPF: ABNORMAL /HPF
UROBILINOGEN UR QL STRIP: ABNORMAL
WBC # UR STRIP: ABNORMAL /HPF
WBC NRBC COR # BLD: 8.94 10*3/MM3 (ref 3.4–10.8)

## 2023-05-08 PROCEDURE — 25010000002 METOCLOPRAMIDE PER 10 MG: Performed by: OBSTETRICS & GYNECOLOGY

## 2023-05-08 PROCEDURE — 25010000002 DIPHENHYDRAMINE PER 50 MG: Performed by: OBSTETRICS & GYNECOLOGY

## 2023-05-08 PROCEDURE — 96375 TX/PRO/DX INJ NEW DRUG ADDON: CPT | Performed by: OBSTETRICS & GYNECOLOGY

## 2023-05-08 PROCEDURE — 96374 THER/PROPH/DIAG INJ IV PUSH: CPT | Performed by: OBSTETRICS & GYNECOLOGY

## 2023-05-08 PROCEDURE — 25010000002 ONDANSETRON PER 1 MG: Performed by: OBSTETRICS & GYNECOLOGY

## 2023-05-08 PROCEDURE — 80053 COMPREHEN METABOLIC PANEL: CPT | Performed by: OBSTETRICS & GYNECOLOGY

## 2023-05-08 PROCEDURE — 99283 EMERGENCY DEPT VISIT LOW MDM: CPT | Performed by: OBSTETRICS & GYNECOLOGY

## 2023-05-08 PROCEDURE — 85025 COMPLETE CBC W/AUTO DIFF WBC: CPT | Performed by: OBSTETRICS & GYNECOLOGY

## 2023-05-08 PROCEDURE — 81001 URINALYSIS AUTO W/SCOPE: CPT | Performed by: OBSTETRICS & GYNECOLOGY

## 2023-05-08 RX ORDER — DIPHENHYDRAMINE HYDROCHLORIDE 50 MG/ML
25 INJECTION INTRAMUSCULAR; INTRAVENOUS ONCE
Status: COMPLETED | OUTPATIENT
Start: 2023-05-08 | End: 2023-05-08

## 2023-05-08 RX ORDER — METOCLOPRAMIDE HYDROCHLORIDE 5 MG/ML
10 INJECTION INTRAMUSCULAR; INTRAVENOUS ONCE
Status: COMPLETED | OUTPATIENT
Start: 2023-05-08 | End: 2023-05-08

## 2023-05-08 RX ORDER — SODIUM CHLORIDE 0.9 % (FLUSH) 0.9 %
10 SYRINGE (ML) INJECTION EVERY 12 HOURS SCHEDULED
Status: DISCONTINUED | OUTPATIENT
Start: 2023-05-08 | End: 2023-05-08 | Stop reason: HOSPADM

## 2023-05-08 RX ORDER — SODIUM CHLORIDE 0.9 % (FLUSH) 0.9 %
10 SYRINGE (ML) INJECTION AS NEEDED
Status: DISCONTINUED | OUTPATIENT
Start: 2023-05-08 | End: 2023-05-08 | Stop reason: HOSPADM

## 2023-05-08 RX ORDER — ONDANSETRON 2 MG/ML
4 INJECTION INTRAMUSCULAR; INTRAVENOUS ONCE
Status: COMPLETED | OUTPATIENT
Start: 2023-05-08 | End: 2023-05-08

## 2023-05-08 RX ADMIN — ONDANSETRON 4 MG: 2 INJECTION INTRAMUSCULAR; INTRAVENOUS at 02:44

## 2023-05-08 RX ADMIN — DIPHENHYDRAMINE HYDROCHLORIDE 25 MG: 50 INJECTION, SOLUTION INTRAMUSCULAR; INTRAVENOUS at 02:38

## 2023-05-08 RX ADMIN — SODIUM CHLORIDE, POTASSIUM CHLORIDE, SODIUM LACTATE AND CALCIUM CHLORIDE 1000 ML: 600; 310; 30; 20 INJECTION, SOLUTION INTRAVENOUS at 03:27

## 2023-05-08 RX ADMIN — SODIUM CHLORIDE, POTASSIUM CHLORIDE, SODIUM LACTATE AND CALCIUM CHLORIDE 1000 ML: 600; 310; 30; 20 INJECTION, SOLUTION INTRAVENOUS at 02:23

## 2023-05-08 RX ADMIN — METOCLOPRAMIDE 10 MG: 5 INJECTION, SOLUTION INTRAMUSCULAR; INTRAVENOUS at 02:40

## 2023-05-08 NOTE — NURSING NOTE
Patient discharged ambulatory in stable condition. Patient discharge with verbal and written education. Patient educated on when to return to the hospital, to keep appointments, about vaginal bleeding/LOF/contractions, and fetal movement. Patient verbalized understanding.

## 2023-05-08 NOTE — OBED NOTES
[facility] Note OB    Patient Name: Jessy Hope  YOB: 1995  MRN: [id number]  Admission Date: 2023  1:49 AM  Date of Service: 2023    Chief Complaint:N/V      Subjective     Jessy Hope is a 28 y.o. female  at 24w2d with Estimated Date of Delivery: 23 who presents with the chief complaint listed above. Pt reports has not been able to keep anything down for 3 days. She has had hyperemesis but it had been getting a bit better. Has only gained 6 lbs during pregnancy. No change in BMs or fever, no sick contacts     She sees Liv Marie MD for her prenatal care. Her pregnancy has been complicated by: UTI, obese, GDM, asthma    She describes fetal movement as normal.  She denies rupture of membranes.  She denies vaginal bleeding. She is not feeling contractions.        Objective   Patient Active Problem List    Diagnosis    • Vaginal bleeding affecting early pregnancy [O20.9]    • Urinary tract infection in mother during pregnancy, antepartum [O23.40]    • ASCUS with positive high risk HPV cervical [R87.610, R87.810]    • Umbilical cord cyst during pregnancy, antepartum [O36.8990]    • Family history of trisomy 18 [Z82.79]    • Increased BMI [R63.8]    • Diet controlled gestational diabetes mellitus (GDM), antepartum [O24.410]    • Obesity affecting pregnancy, antepartum [O99.210]         OB History    Para Term  AB Living   3 2 1 1 0 1   SAB IAB Ectopic Molar Multiple Live Births   0 0 0 0 0 1      # Outcome Date GA Lbr Slick/2nd Weight Sex Delivery Anes PTL Lv   3 Current            2  2018        FD      Complications: Stillborn, abnormal   1 Term 06/19/15 39w6d  3090 g (6 lb 13 oz) F Vag-Spont Pud N ROCHELLE        Past Medical History:   Diagnosis Date   • Asthma    • Gestational diabetes        No past surgical history on file.    No current facility-administered medications on file prior to encounter.     Current Outpatient Medications on File Prior to  Encounter   Medication Sig Dispense Refill   • Acetaminophen (TYLENOL 8 HOUR PO) Take  by mouth.     • aspirin 81 MG chewable tablet Chew 1 tablet Daily. 30 tablet 6   • Blood Glucose Monitoring Suppl (FreeStyle Lite) w/Device kit      • famotidine (Pepcid) 20 MG tablet Take 1 tablet by mouth 2 (Two) Times a Day. 30 tablet 5   • glucose blood test strip Use as instructed to test glucose 4 to 7 times daily 200 each 12   • glucose monitor monitoring kit 1 each As Needed (use to test glucose 4 times daily). 1 each 0   • Lancets 30G misc 1 Device 1 (One) Time As Needed (glucose monitoring) for up to 1 dose. For 4 to 7 times daily 200 each 12   • metFORMIN ER (GLUCOPHAGE-XR) 500 MG 24 hr tablet Take 1 tablet by mouth Daily With Breakfast & Dinner. 60 tablet 6   • nitrofurantoin, macrocrystal-monohydrate, (Macrobid) 100 MG capsule Take 1 capsule by mouth Every Night. 30 capsule 7   • ondansetron ODT (ZOFRAN-ODT) 4 MG disintegrating tablet Place 1 tablet on the tongue Every 6 (Six) Hours As Needed for Nausea or Vomiting for up to 115 days. 60 tablet 5   • Prenatal Vit-Fe Sulfate-FA-DHA (Prenatal Vitamin/Min +DHA) 27-0.8-200 MG capsule Take 1 tablet by mouth Daily. 30 capsule 11   • vitamin B-6 (PYRIDOXINE) 25 MG tablet Take 1 tablet by mouth 3 (Three) Times a Day. 90 tablet 2       Allergies   Allergen Reactions   • Morphine Hives     Chest pain, SOB, hives       Family History   Problem Relation Age of Onset   • Diabetes Mother    • Hypertension Mother    • Diabetes Father    • Hypertension Father    • Diabetes Brother    • Leukemia Brother    • Breast cancer Paternal Grandmother    • Ovarian cancer Neg Hx    • Uterine cancer Neg Hx    • Colon cancer Neg Hx        Social History     Socioeconomic History   • Marital status: Single   Tobacco Use   • Smoking status: Never     Passive exposure: Never   • Smokeless tobacco: Never   Vaping Use   • Vaping Use: Never used   Substance and Sexual Activity   • Alcohol use: Not  Currently   • Drug use: Never   • Sexual activity: Yes     Partners: Male     Birth control/protection: None           Review of Systems   Constitutional: Negative for chills and fever.   HENT: Negative.    Eyes: Negative for photophobia and visual disturbance.   Respiratory: Negative for shortness of breath.    Cardiovascular: Negative for chest pain.   Gastrointestinal: Positive for nausea and vomiting.   Psychiatric/Behavioral: The patient is not nervous/anxious.           PHYSICAL EXAM:      VITAL SIGNS:  There were no vitals filed for this visit.         FHT'S:   140 with moderate variabilty                                     PHYSICAL EXAM:      General: well developed; well nourished  no acute distress   Heart: Not performed.   Lungs   breathing is unlabored   Abdomen: Gravid and non tender     Extremities: trace edema, DTRs 1 plus, no clonus       Cervix: was not checked.        Contractions:   none                    LABS AND TESTING ORDERED:  1. Uterine and fetal monitoring  2. Urinalysis  3. CBC, CMP    LAB RESULTS:    No results found for this or any previous visit (from the past 24 hour(s)).    Lab Results   Component Value Date    ABO O 02/04/2023    RH Positive 02/04/2023       No results found for: STREPGPB              External Prenatal Results     Pregnancy Outside Results - Transcribed From Office Records - See Scanned Records For Details     Test Value Date Time    ABO  O  02/04/23 2320    Rh  Positive  02/04/23 2320    Antibody Screen  Negative  02/04/23 2320       Negative  01/17/23 1354    Varicella IgG ^ had infection  02/07/23     Rubella  2.01 index 01/17/23 1354    Hgb  11.8 g/dL 04/14/23 0939       11.1 g/dL 03/06/23 1026       10.9 g/dL 03/01/23 0441       12.9 g/dL 02/28/23 2137       11.8 g/dL 02/05/23 0010       12.5 g/dL 01/19/23 0940       12.4 g/dL 01/17/23 1354      ^ 12.0 g/dL 12/30/22 1501       12.0 g/dL 12/18/22 0339       12.6 g/dL 12/17/22 1432    Hct  36.4 % 04/14/23 0939        33.3 % 23 1026       32.1 % 23 0441       39.0 % 23 2137       34.4 % 23 0010       38.5 % 23 0940       36.9 % 23 1354      ^ 34.9 % 22 1501       35.3 % 22 0339       38.3 % 22 1432    Glucose Fasting GTT       Glucose Tolerance Test 1 hour       Glucose Tolerance Test 3 hour       Gonorrhea (discrete)  Negative  23 1324    Chlamydia (discrete)  Negative  23 1324    RPR  Non Reactive  23 1354    VDRL       Syphilis Antibody ^ <0.10 (index_val) 10/10/18 1043    HBsAg  Negative  23 1354    Herpes Simplex Virus PCR       Herpes Simplex VIrus Culture       HIV  Non Reactive  23 1354    Hep C RNA Quant PCR       Hep C Antibody  <0.1 s/co ratio 23 1354    AFP  16.3 ng/mL 23 1026    Group B Strep       GBS Susceptibility to Clindamycin       GBS Susceptibility to Erythromycin       Fetal Fibronectin       Genetic Testing, Maternal Blood             Drug Screening     Test Value Date Time    Urine Drug Screen       Amphetamine Screen ^ Negative (arb'U) 10/10/18 1043    Barbiturate Screen       Benzodiazepine Screen       Methadone Screen       Phencyclidine Screen       Opiates Screen ^ Negative  10/10/18 1043    THC Screen       Cocaine Screen       Propoxyphene Screen       Buprenorphine Screen       Methamphetamine Screen       Oxycodone Screen       Tricyclic Antidepressants Screen             Legend    ^: Historical                          Impression:   @ 24w2d .  Final Diagnosis: hyperemesis gravidarum     Plan:  1. IVF, IV benadryl, Reglan, zofran  2. Plan of care has been reviewed with patient along with risks, benefits of treatment.   All questions have been answered. Call health care provider for any further concerns and keep office appointments.  3.Hydration precautions, replace electrolytes      I discussed the patients findings and my recommendations with patient, family and nursing staff      Conchita JEAN-BAPTISTE  MD Chao  5/8/2023  02:02 EDT

## 2023-05-08 NOTE — NURSING NOTE
"RN spoke with MD. Dr. Guidry notified that patient was having cramping that patient reported she feels with her chronic UTI's. MD stated to check patient. Patient declined cervical exam and stated \"it wasn't necessary.\" MD stated that patient could be taken off continuous FHR monitoring, but keep TOCO on.   "

## 2023-05-09 ENCOUNTER — ROUTINE PRENATAL (OUTPATIENT)
Dept: OBSTETRICS AND GYNECOLOGY | Age: 28
End: 2023-05-09
Payer: COMMERCIAL

## 2023-05-09 VITALS — DIASTOLIC BLOOD PRESSURE: 78 MMHG | WEIGHT: 258 LBS | BODY MASS INDEX: 38.1 KG/M2 | SYSTOLIC BLOOD PRESSURE: 122 MMHG

## 2023-05-09 DIAGNOSIS — R12 HEARTBURN: ICD-10-CM

## 2023-05-09 DIAGNOSIS — O24.410 DIET CONTROLLED GESTATIONAL DIABETES MELLITUS (GDM), ANTEPARTUM: ICD-10-CM

## 2023-05-09 DIAGNOSIS — R39.9 UTI SYMPTOMS: ICD-10-CM

## 2023-05-09 DIAGNOSIS — O99.210 OBESITY AFFECTING PREGNANCY, ANTEPARTUM: ICD-10-CM

## 2023-05-09 DIAGNOSIS — Z3A.24 24 WEEKS GESTATION OF PREGNANCY: Primary | ICD-10-CM

## 2023-05-09 DIAGNOSIS — R63.8 INCREASED BMI: ICD-10-CM

## 2023-05-09 LAB
BILIRUB BLD-MCNC: ABNORMAL MG/DL
CLARITY, POC: ABNORMAL
COLOR UR: ABNORMAL
GLUCOSE UR STRIP-MCNC: NEGATIVE MG/DL
KETONES UR QL: ABNORMAL
LEUKOCYTE EST, POC: ABNORMAL
NITRITE UR-MCNC: POSITIVE MG/ML
PH UR: 5.5 [PH] (ref 5–8)
PROT UR STRIP-MCNC: ABNORMAL MG/DL
RBC # UR STRIP: ABNORMAL /UL
SP GR UR: 1.02 (ref 1–1.03)
UROBILINOGEN UR QL: ABNORMAL

## 2023-05-09 RX ORDER — SULFAMETHOXAZOLE AND TRIMETHOPRIM 800; 160 MG/1; MG/1
1 TABLET ORAL 2 TIMES DAILY
Qty: 14 TABLET | Refills: 0 | Status: SHIPPED | OUTPATIENT
Start: 2023-05-09 | End: 2023-05-16

## 2023-05-09 RX ORDER — OMEPRAZOLE 20 MG/1
20 CAPSULE, DELAYED RELEASE ORAL DAILY
Qty: 30 CAPSULE | Refills: 1 | Status: SHIPPED | OUTPATIENT
Start: 2023-05-09 | End: 2024-05-08

## 2023-05-09 NOTE — PROGRESS NOTES
Chief Complaint   Patient presents with   • Routine Prenatal Visit     24 week ob visit c/o pepcid is not working, and feeling like she has a uti.       HPI: 28 y.o.  at 24w3d gestation  She is dealing with HB sx's  pepcid is not working  Will try prilosec  Also disc diet changes-no carbonation, spicy foods or eating 2 hours prior to bedtime  UA looks suspicious so bactrim started, previously used and worked for previous uti  Is taking macrobid daily  Does drink adequate amount of water daily  She is GDM-reviewed labs and ultimately wnl other than 1 slightly elevated am fasting (100)  She does see diabetic counsellor  Weight is stable, BP wnl at 122/78  Good FM  Has appt with MFM on the  f/u here in 4 wks and plan tdap      Vitals:    23 1318   BP: 122/78   Weight: 117 kg (258 lb)       ROS:  GI:  Negative  : na  Pulmonary: Negative     A/P  1. Intrauterine pregnancy at 24w3d   2. Pregnancy Risk:  HIGH RISK    Diagnoses and all orders for this visit:    1. 24 weeks gestation of pregnancy (Primary)  -     POC Urinalysis Dipstick, Multipro    2. Diet controlled gestational diabetes mellitus (GDM), antepartum    3. Increased BMI    4. Obesity affecting pregnancy, antepartum    5. UTI symptoms  -     Urine Culture - Urine, Urine, Random Void    6. Heartburn    Other orders  -     omeprazole (priLOSEC) 20 MG capsule; Take 1 capsule by mouth Daily.  Dispense: 30 capsule; Refill: 1  -     sulfamethoxazole-trimethoprim (Bactrim DS) 800-160 MG per tablet; Take 1 tablet by mouth 2 (Two) Times a Day for 7 days.  Dispense: 14 tablet; Refill: 0        -----------------------  PLAN:   Return in about 4 weeks (around 2023) for tdap.      ERMIAS Balbuena  2023 14:42 EDT

## 2023-05-10 ENCOUNTER — TELEPHONE (OUTPATIENT)
Dept: OBSTETRICS AND GYNECOLOGY | Facility: CLINIC | Age: 28
End: 2023-05-10
Payer: COMMERCIAL

## 2023-05-12 ENCOUNTER — TELEPHONE (OUTPATIENT)
Dept: OBSTETRICS AND GYNECOLOGY | Facility: CLINIC | Age: 28
End: 2023-05-12
Payer: COMMERCIAL

## 2023-05-12 LAB
BACTERIA UR CULT: ABNORMAL
BACTERIA UR CULT: ABNORMAL
OTHER ANTIBIOTIC SUSC ISLT: ABNORMAL

## 2023-05-12 NOTE — TELEPHONE ENCOUNTER
Call placed to Jessy to obtain blood sugar logs for Dr. Woodward to review. No answer at this time and voicemail left.  Next M appointment is scheduled 5/19.

## 2023-05-14 NOTE — PROGRESS NOTES
MATERNAL FETAL MEDICINE Consult Note    Dear Dr. Marie,     Thank you for your kind referral of Jessy Hope.  As you know, she is a 28 y.o.   at  25 6/7 weeks gestation (Estimated Date of Delivery: 23). This is a consult.      Her antepartum course is complicated by:  GDM  Hx baby w T18  Umbilical cord cyst in 1st trimester, not seen today    HPI: Today, she denies headache, blurry vision, RUQ pain. No vaginal bleeding, no contractions.     Review of History:  Past Medical History:   Diagnosis Date   • Asthma    • Gestational diabetes      History reviewed. No pertinent surgical history.      Social History     Socioeconomic History   • Marital status: Single   Tobacco Use   • Smoking status: Never     Passive exposure: Never   • Smokeless tobacco: Never   Vaping Use   • Vaping Use: Never used   Substance and Sexual Activity   • Alcohol use: Not Currently   • Drug use: Never   • Sexual activity: Yes     Partners: Male     Birth control/protection: None     Family History   Problem Relation Age of Onset   • Diabetes Mother    • Hypertension Mother    • Diabetes Father    • Hypertension Father    • Diabetes Brother    • Leukemia Brother    • Breast cancer Paternal Grandmother    • Ovarian cancer Neg Hx    • Uterine cancer Neg Hx    • Colon cancer Neg Hx       Allergies   Allergen Reactions   • Morphine Hives     Chest pain, SOB, hives      Current Outpatient Medications on File Prior to Visit   Medication Sig Dispense Refill   • Acetaminophen (TYLENOL 8 HOUR PO) Take  by mouth.     • aspirin 81 MG chewable tablet Chew 1 tablet Daily. 30 tablet 6   • Blood Glucose Monitoring Suppl (FreeStyle Lite) w/Device kit      • glucose blood test strip Use as instructed to test glucose 4 to 7 times daily 200 each 12   • glucose monitor monitoring kit 1 each As Needed (use to test glucose 4 times daily). 1 each 0   • Lancets 30G misc 1 Device 1 (One) Time As Needed (glucose monitoring) for up to 1 dose. For 4 to  "7 times daily 200 each 12   • metFORMIN ER (GLUCOPHAGE-XR) 500 MG 24 hr tablet Take 1 tablet by mouth Daily With Breakfast & Dinner. 60 tablet 6   • nitrofurantoin, macrocrystal-monohydrate, (Macrobid) 100 MG capsule Take 1 capsule by mouth Every Night. 30 capsule 7   • omeprazole (priLOSEC) 20 MG capsule Take 1 capsule by mouth Daily. 30 capsule 1   • ondansetron ODT (ZOFRAN-ODT) 4 MG disintegrating tablet Place 1 tablet on the tongue Every 6 (Six) Hours As Needed for Nausea or Vomiting for up to 115 days. 60 tablet 5   • Prenatal Vit-Fe Sulfate-FA-DHA (Prenatal Vitamin/Min +DHA) 27-0.8-200 MG capsule Take 1 tablet by mouth Daily. 30 capsule 11   • vitamin B-6 (PYRIDOXINE) 25 MG tablet Take 1 tablet by mouth 3 (Three) Times a Day. 90 tablet 2     No current facility-administered medications on file prior to visit.        Past obstetric, gynecological, medical, surgical, family and social history reviewed.  Relevant lab work and imaging reviewed.    Review of systems  Constitutional:  denies fever, chills, malaise.   ENT/Mouth:  denies sore throat, tinnitis  Eyes: denies vision changes/pain  CV:  denies chest pain  Respiratory:  denies cough/SOB  GI:  denies N/V, diarrhea, abdominal pain.    :   denies dysuria  Skin:  denies lesions or pruritis   Neuro:  denies weakness, focal neurologic symptoms    Vitals:    05/19/23 0817   BP: 118/73   BP Location: Right arm   Patient Position: Sitting   Pulse: 103   Resp: 16   Temp: 98.6 °F (37 °C)   TempSrc: Temporal   Weight: 116 kg (256 lb 12.8 oz)   Height: 175.3 cm (69\")       PHYSICAL EXAM   GENERAL: Not in acute distress, AAOx3, pleasant  CARDIO: regular rate and rhythm  PULM: symmetric chest rise, speaking in complete sentences without difficulty  NEURO: awake, alert and oriented to person, place, and time  ABDOMINAL: No fundal tenderness, no rebound or guarding, gravid  EXTREMITIES: no bilateral lower extremity edema/tenderness  SKIN: Warm, " well-perfused      ULTRASOUND   Please view full ultrasound note on Imaging tab in [employer].  Cephalic presentation.  Anterior placenta.  CHARAN 23 cm, which is high normal.    g (51% AC 61%)  Active fetus  Normal limited anatomy.    ASSESSMENT/COUNSELIN y.o.   at  25 6/7 weeks gestation (Estimated Date of Delivery: 23).     -Pregnancy  [ X ] stable  [   ] improving [  ] worsening    Diagnoses and all orders for this visit:    1. Gestational diabetes mellitus (GDM), antepartum, gestational diabetes method of control unspecified (Primary)    Other orders  -     Continuous Blood Gluc  (Dexcom G6 ) device; 1 Device 1 (One) Time for 1 dose.  Dispense: 1 each; Refill: 1  -     Continuous Blood Gluc Transmit (Dexcom G6 Transmitter) misc; 1 Device Every 30 (Thirty) Days.  Dispense: 30 each; Refill: 8  -     Continuous Blood Gluc Sensor (Dexcom G6 Sensor); Every 10 (Ten) Days.  Dispense: 60 each; Refill: 6  -     metFORMIN ER (GLUCOPHAGE-XR) 500 MG 24 hr tablet; Take 2 tablets by mouth 2 (Two) Times a Day.  Dispense: 60 tablet; Refill: 7         GDMA2  Previously counseled:  Counseling: I explained to her that patients with diabetes have a variety of pregnancy related risks that are related to their level of glycemic control.  We discussed at length the increased risks related to diabetes including but not limited to congenital anomalies, fetal growth disorders (FGR or LGA), indicated or spontaneous  birth, macrosomia, polyhydramnios, birth trauma,  complications (jaundice, hypoglycemia, NICU admit) and stillbirth with uncontrolled glucose.       She did not bring her most recent log.  She is on metformin 500 mg BID and we will now add 1000 BID.  We discussed her snacks and eating protein with her afternoon snack.  She is eating candy for snack and discussed eating a 15 gram carb snack with protein such as peanut butter and crackers.  She understands    We discussed  diet recommendations, and goal pre-prandial values below 90 and 1-hour post-prandial values below 120 to optimize her pregnancy outcome as many studies have demonstrated that this is the normal range for pregnant women without diabetes.  She will send sugars on .  We discussed meal timing and eating every 2-3 hours with 30g carbs at breakfast, 45 for lunch and dinner, and 10-15g snacks in between and at bedtime.      A1c was 5.8 so no ECHO ordered.  Her heart views have been normal.  Would recommend 2 hour GTT at postpartum visit to rule out T2DM.  Has had a GI bug but able to eat and do better now.      FH trisomy 18:  Anatomy normal today and low risk NIPT.        Recommendations:  -PIH baseline labs noted and normal  -Continue ADA diet with carbohydrate counting.  Has seen diabetic counselors  -Continue glucose monitoring 6-7 times daily as instructed.  -Continue medications: on Metformin  -Ultrasound for fetal growth every 4 weeks  - testing at 32 weeks gestation--will likely trade off weekly with Dr. Marie   -Continue routine prenatal care with primary ob team   -At present goal for delivery is 39 weeks       Follow-up: 4 weeks growth, send sugars weekly.     Thank you for the consult and opportunity to care for this patient.  Please feel free to reach out with any questions or concerns.      I spent 10 minutes caring for this patient on this date of service. This time includes time spent by me in the following activities: preparing for the visit, reviewing tests, obtaining and/or reviewing a separately obtained history, performing a medically appropriate examination and/or evaluation, counseling and educating the patient/family/caregiver and independently interpreting results and communicating that information with the patient/family/caregiver with greater than 50% spent in counseling and coordination of care.       I spent 5 minutes on the separately reported service of US imaging not  included in the time used to support the E/M service also reported today.      Nayeli Woodward MD [facility]  [facility]  Office: [phone]  [email or web address]

## 2023-05-19 ENCOUNTER — TRANSCRIBE ORDERS (OUTPATIENT)
Dept: ULTRASOUND IMAGING | Facility: HOSPITAL | Age: 28
End: 2023-05-19
Payer: COMMERCIAL

## 2023-05-19 ENCOUNTER — OFFICE VISIT (OUTPATIENT)
Dept: OBSTETRICS AND GYNECOLOGY | Facility: CLINIC | Age: 28
End: 2023-05-19
Payer: COMMERCIAL

## 2023-05-19 ENCOUNTER — HOSPITAL ENCOUNTER (OUTPATIENT)
Dept: ULTRASOUND IMAGING | Facility: HOSPITAL | Age: 28
Discharge: HOME | End: 2023-05-19
Payer: COMMERCIAL

## 2023-05-19 VITALS
RESPIRATION RATE: 16 BRPM | SYSTOLIC BLOOD PRESSURE: 118 MMHG | TEMPERATURE: 98.6 F | DIASTOLIC BLOOD PRESSURE: 73 MMHG | BODY MASS INDEX: 38.03 KG/M2 | HEIGHT: 69 IN | HEART RATE: 103 BPM | WEIGHT: 256.8 LBS

## 2023-05-19 DIAGNOSIS — O24.419 GESTATIONAL DIABETES MELLITUS (GDM), ANTEPARTUM, GESTATIONAL DIABETES METHOD OF CONTROL UNSPECIFIED: Primary | ICD-10-CM

## 2023-05-19 DIAGNOSIS — O24.410 DIET CONTROLLED GESTATIONAL DIABETES MELLITUS (GDM), ANTEPARTUM: Primary | ICD-10-CM

## 2023-05-19 DIAGNOSIS — O24.410 DIET CONTROLLED GESTATIONAL DIABETES MELLITUS (GDM), ANTEPARTUM: ICD-10-CM

## 2023-05-19 LAB — GLUCOSE BLDC GLUCOMTR-MCNC: 102 MG/DL (ref 70–130)

## 2023-05-19 PROCEDURE — 76819 FETAL BIOPHYS PROFIL W/O NST: CPT

## 2023-05-19 PROCEDURE — 76816 OB US FOLLOW-UP PER FETUS: CPT

## 2023-05-19 PROCEDURE — 82948 REAGENT STRIP/BLOOD GLUCOSE: CPT

## 2023-05-19 RX ORDER — PROCHLORPERAZINE 25 MG/1
SUPPOSITORY RECTAL
Qty: 60 EACH | Refills: 6 | Status: SHIPPED | OUTPATIENT
Start: 2023-05-19

## 2023-05-19 RX ORDER — PROCHLORPERAZINE 25 MG/1
1 SUPPOSITORY RECTAL
Qty: 30 EACH | Refills: 8 | Status: SHIPPED | OUTPATIENT
Start: 2023-05-19

## 2023-05-19 RX ORDER — PROCHLORPERAZINE 25 MG/1
1 SUPPOSITORY RECTAL ONCE
Qty: 1 EACH | Refills: 1 | Status: SHIPPED | OUTPATIENT
Start: 2023-05-19 | End: 2023-05-19

## 2023-05-19 RX ORDER — METFORMIN HYDROCHLORIDE 500 MG/1
1000 TABLET, EXTENDED RELEASE ORAL 2 TIMES DAILY
Qty: 60 TABLET | Refills: 7 | Status: SHIPPED | OUTPATIENT
Start: 2023-05-19

## 2023-05-19 NOTE — LETTER
May 19, 2023     Liv Marie MD  [address] [facility] [address]    Patient: Jessy Hope   YOB: 1995   Date of Visit: 2023       Dear Liv Marie MD,    Thank you for referring Jessy Hope to me for evaluation. Below is a copy of my consult note.    If you have questions, please do not hesitate to call me. I look forward to following Jessy along with you.         Sincerely,        Nayeli Woodward MD      MATERNAL FETAL MEDICINE Consult Note    Dear Dr. Marie,     Thank you for your kind referral of Jessy Hope.  As you know, she is a 28 y.o.   at  25 6/7 weeks gestation (Estimated Date of Delivery: 23). This is a consult.      Her antepartum course is complicated by:  GDM  Hx baby w T18  Umbilical cord cyst in 1st trimester, not seen today    HPI: Today, she denies headache, blurry vision, RUQ pain. No vaginal bleeding, no contractions.     Review of History:  Past Medical History:   Diagnosis Date   • Asthma    • Gestational diabetes      History reviewed. No pertinent surgical history.      Social History     Socioeconomic History   • Marital status: Single   Tobacco Use   • Smoking status: Never     Passive exposure: Never   • Smokeless tobacco: Never   Vaping Use   • Vaping Use: Never used   Substance and Sexual Activity   • Alcohol use: Not Currently   • Drug use: Never   • Sexual activity: Yes     Partners: Male     Birth control/protection: None     Family History   Problem Relation Age of Onset   • Diabetes Mother    • Hypertension Mother    • Diabetes Father    • Hypertension Father    • Diabetes Brother    • Leukemia Brother    • Breast cancer Paternal Grandmother    • Ovarian cancer Neg Hx    • Uterine cancer Neg Hx    • Colon cancer Neg Hx       Allergies   Allergen Reactions   • Morphine Hives     Chest pain, SOB, hives      Current Outpatient Medications on File Prior to Visit   Medication Sig Dispense Refill   • Acetaminophen  (TYLENOL 8 HOUR PO) Take  by mouth.     • aspirin 81 MG chewable tablet Chew 1 tablet Daily. 30 tablet 6   • Blood Glucose Monitoring Suppl (FreeStyle Lite) w/Device kit      • glucose blood test strip Use as instructed to test glucose 4 to 7 times daily 200 each 12   • glucose monitor monitoring kit 1 each As Needed (use to test glucose 4 times daily). 1 each 0   • Lancets 30G misc 1 Device 1 (One) Time As Needed (glucose monitoring) for up to 1 dose. For 4 to 7 times daily 200 each 12   • metFORMIN ER (GLUCOPHAGE-XR) 500 MG 24 hr tablet Take 1 tablet by mouth Daily With Breakfast & Dinner. 60 tablet 6   • nitrofurantoin, macrocrystal-monohydrate, (Macrobid) 100 MG capsule Take 1 capsule by mouth Every Night. 30 capsule 7   • omeprazole (priLOSEC) 20 MG capsule Take 1 capsule by mouth Daily. 30 capsule 1   • ondansetron ODT (ZOFRAN-ODT) 4 MG disintegrating tablet Place 1 tablet on the tongue Every 6 (Six) Hours As Needed for Nausea or Vomiting for up to 115 days. 60 tablet 5   • Prenatal Vit-Fe Sulfate-FA-DHA (Prenatal Vitamin/Min +DHA) 27-0.8-200 MG capsule Take 1 tablet by mouth Daily. 30 capsule 11   • vitamin B-6 (PYRIDOXINE) 25 MG tablet Take 1 tablet by mouth 3 (Three) Times a Day. 90 tablet 2     No current facility-administered medications on file prior to visit.        Past obstetric, gynecological, medical, surgical, family and social history reviewed.  Relevant lab work and imaging reviewed.    Review of systems  Constitutional:  denies fever, chills, malaise.   ENT/Mouth:  denies sore throat, tinnitis  Eyes: denies vision changes/pain  CV:  denies chest pain  Respiratory:  denies cough/SOB  GI:  denies N/V, diarrhea, abdominal pain.    :   denies dysuria  Skin:  denies lesions or pruritis   Neuro:  denies weakness, focal neurologic symptoms    Vitals:    05/19/23 0817   BP: 118/73   BP Location: Right arm   Patient Position: Sitting   Pulse: 103   Resp: 16   Temp: 98.6 °F (37 °C)   TempSrc: Temporal  "  Weight: 116 kg (256 lb 12.8 oz)   Height: 175.3 cm (69\")       PHYSICAL EXAM   GENERAL: Not in acute distress, AAOx3, pleasant  CARDIO: regular rate and rhythm  PULM: symmetric chest rise, speaking in complete sentences without difficulty  NEURO: awake, alert and oriented to person, place, and time  ABDOMINAL: No fundal tenderness, no rebound or guarding, gravid  EXTREMITIES: no bilateral lower extremity edema/tenderness  SKIN: Warm, well-perfused      ULTRASOUND   Please view full ultrasound note on Imaging tab in ViewPoint.  Cephalic presentation.  Anterior placenta.  CHARAN 23 cm, which is high normal.    g (51% AC 61%)  Active fetus  Normal limited anatomy.    ASSESSMENT/COUNSELIN y.o.   at  25 6/7 weeks gestation (Estimated Date of Delivery: 23).     -Pregnancy  [ X ] stable  [   ] improving [  ] worsening    Diagnoses and all orders for this visit:    1. Gestational diabetes mellitus (GDM), antepartum, gestational diabetes method of control unspecified (Primary)    Other orders  -     Continuous Blood Gluc  (Dexcom G6 ) device; 1 Device 1 (One) Time for 1 dose.  Dispense: 1 each; Refill: 1  -     Continuous Blood Gluc Transmit (Dexcom G6 Transmitter) misc; 1 Device Every 30 (Thirty) Days.  Dispense: 30 each; Refill: 8  -     Continuous Blood Gluc Sensor (Dexcom G6 Sensor); Every 10 (Ten) Days.  Dispense: 60 each; Refill: 6  -     metFORMIN ER (GLUCOPHAGE-XR) 500 MG 24 hr tablet; Take 2 tablets by mouth 2 (Two) Times a Day.  Dispense: 60 tablet; Refill: 7         GDMA2  Previously counseled:  Counseling: I explained to her that patients with diabetes have a variety of pregnancy related risks that are related to their level of glycemic control.  We discussed at length the increased risks related to diabetes including but not limited to congenital anomalies, fetal growth disorders (FGR or LGA), indicated or spontaneous  birth, macrosomia, polyhydramnios, birth " trauma,  complications (jaundice, hypoglycemia, NICU admit) and stillbirth with uncontrolled glucose.       She did not bring her most recent log.  She is on metformin 500 mg BID and we will now add 1000 BID.  We discussed her snacks and eating protein with her afternoon snack.  She is eating candy for snack and discussed eating a 15 gram carb snack with protein such as peanut butter and crackers.  She understands    We discussed diet recommendations, and goal pre-prandial values below 90 and 1-hour post-prandial values below 120 to optimize her pregnancy outcome as many studies have demonstrated that this is the normal range for pregnant women without diabetes.  She will send sugars on .  We discussed meal timing and eating every 2-3 hours with 30g carbs at breakfast, 45 for lunch and dinner, and 10-15g snacks in between and at bedtime.      A1c was 5.8 so no ECHO ordered.  Her heart views have been normal.  Would recommend 2 hour GTT at postpartum visit to rule out T2DM.  Has had a GI bug but able to eat and do better now.      FH trisomy 18:  Anatomy normal today and low risk NIPT.        Recommendations:  -PIH baseline labs noted and normal  -Continue ADA diet with carbohydrate counting.  Has seen diabetic counselors  -Continue glucose monitoring 6-7 times daily as instructed.  -Continue medications: on Metformin  -Ultrasound for fetal growth every 4 weeks  - testing at 32 weeks gestation--will likely trade off weekly with Dr. Marie   -Continue routine prenatal care with primary ob team   -At present goal for delivery is 39 weeks       Follow-up: 4 weeks growth, send sugars weekly.     Thank you for the consult and opportunity to care for this patient.  Please feel free to reach out with any questions or concerns.      I spent 10 minutes caring for this patient on this date of service. This time includes time spent by me in the following activities: preparing for the visit, reviewing  tests, obtaining and/or reviewing a separately obtained history, performing a medically appropriate examination and/or evaluation, counseling and educating the patient/family/caregiver and independently interpreting results and communicating that information with the patient/family/caregiver with greater than 50% spent in counseling and coordination of care.       I spent 5 minutes on the separately reported service of US imaging not included in the time used to support the E/M service also reported today.      Nayeli Woodward MD [facility]  Maternal Fetal Medicine-[facility]  Office: [phone]  [email or web address]

## 2023-05-19 NOTE — PROGRESS NOTES
Pt reports that she is doing well and denies vaginal bleeding, cramping, contractions or LOF at this time. Reports active fetal movement. Reviewed when to call OB office or present to L&D for evaluation with symptoms such as decreased fetal movement, vaginal bleeding, LOF or ctxs. Pt verbalized understanding. Denies HA, visual changes or epigastric pain. Denies any additional complaints at time of appointment. Next OB appointment scheduled for 06/05    Vitals:    05/19/23 0817   BP: 118/73   Pulse: 103   Resp: 16   Temp: 98.6 °F (37 °C)

## 2023-05-24 ENCOUNTER — TELEPHONE (OUTPATIENT)
Dept: OBSTETRICS AND GYNECOLOGY | Facility: CLINIC | Age: 28
End: 2023-05-24
Payer: COMMERCIAL

## 2023-05-26 ENCOUNTER — TELEPHONE (OUTPATIENT)
Dept: OBSTETRICS AND GYNECOLOGY | Facility: CLINIC | Age: 28
End: 2023-05-26
Payer: COMMERCIAL

## 2023-06-01 ENCOUNTER — REFERRAL TRIAGE (OUTPATIENT)
Dept: LABOR AND DELIVERY | Facility: HOSPITAL | Age: 28
End: 2023-06-01

## 2023-06-02 ENCOUNTER — TELEPHONE (OUTPATIENT)
Dept: OBSTETRICS AND GYNECOLOGY | Facility: CLINIC | Age: 28
End: 2023-06-02

## 2023-06-04 ENCOUNTER — HOSPITAL ENCOUNTER (EMERGENCY)
Facility: HOSPITAL | Age: 28
Discharge: HOME | End: 2023-06-04
Attending: OBSTETRICS & GYNECOLOGY | Admitting: OBSTETRICS & GYNECOLOGY
Payer: COMMERCIAL

## 2023-06-04 ENCOUNTER — HOSPITAL ENCOUNTER (OUTPATIENT)
Facility: HOSPITAL | Age: 28
End: 2023-06-04
Admitting: OBSTETRICS & GYNECOLOGY
Payer: COMMERCIAL

## 2023-06-04 VITALS
HEART RATE: 98 BPM | DIASTOLIC BLOOD PRESSURE: 73 MMHG | RESPIRATION RATE: 15 BRPM | BODY MASS INDEX: 37.92 KG/M2 | SYSTOLIC BLOOD PRESSURE: 103 MMHG | TEMPERATURE: 98.8 F | HEIGHT: 69 IN | WEIGHT: 256 LBS

## 2023-06-04 LAB
A1 MICROGLOB PLACENTAL VAG QL: NEGATIVE
BACTERIA UR QL AUTO: ABNORMAL /HPF
BILIRUB UR QL STRIP: NEGATIVE
CLARITY UR: ABNORMAL
COLOR UR: ABNORMAL
GLUCOSE BLDC GLUCOMTR-MCNC: 73 MG/DL (ref 70–130)
GLUCOSE UR STRIP-MCNC: NEGATIVE MG/DL
HGB UR QL STRIP.AUTO: NEGATIVE
HYALINE CASTS UR QL AUTO: ABNORMAL /LPF
KETONES UR QL STRIP: ABNORMAL
LEUKOCYTE ESTERASE UR QL STRIP.AUTO: ABNORMAL
NITRITE UR QL STRIP: NEGATIVE
PH UR STRIP.AUTO: 6 [PH] (ref 5–8)
PROT UR QL STRIP: ABNORMAL
RBC # UR STRIP: ABNORMAL /HPF
REF LAB TEST METHOD: ABNORMAL
SP GR UR STRIP: 1.03 (ref 1–1.03)
SQUAMOUS #/AREA URNS HPF: ABNORMAL /HPF
UROBILINOGEN UR QL STRIP: ABNORMAL
WBC # UR STRIP: ABNORMAL /HPF

## 2023-06-04 PROCEDURE — 84112 EVAL AMNIOTIC FLUID PROTEIN: CPT | Performed by: OBSTETRICS & GYNECOLOGY

## 2023-06-04 PROCEDURE — 82948 REAGENT STRIP/BLOOD GLUCOSE: CPT

## 2023-06-04 PROCEDURE — 87086 URINE CULTURE/COLONY COUNT: CPT | Performed by: OBSTETRICS & GYNECOLOGY

## 2023-06-04 PROCEDURE — 81001 URINALYSIS AUTO W/SCOPE: CPT | Performed by: OBSTETRICS & GYNECOLOGY

## 2023-06-04 PROCEDURE — 99284 EMERGENCY DEPT VISIT MOD MDM: CPT | Performed by: OBSTETRICS & GYNECOLOGY

## 2023-06-04 PROCEDURE — 59025 FETAL NON-STRESS TEST: CPT

## 2023-06-04 NOTE — OBED NOTES
"[facility]  Jessy Hope  : 1995  MRN: [id number]  CSN: [id number]    OB ED Provider Note    Subjective   Chief Complaint   Patient presents with    Contractions    Rupture of Membranes     Pt stated she has had contractions on and off today, was sitting on bed at 1830 and felt a gush of clear fluid and leaking after. Pos fetal  movement.      Jessy Hope is a 28 y.o. year old  with an Estimated Date of Delivery: 23 currently at 28w1d presenting with a gush of clear fluid while sitting down at 1830. Since that time, she has had a small amount of fluid trickle out.  She denies CTX or VB. FM is present.    Prenatal care has been with Dr. Marie.  It has been complicated by GDM - A2 and umbilical cord cyst. She has a history of a trisomy 18 delivery . She is on macrobid prophylaxis for recurrent UTI.    OB History    Para Term  AB Living   3 2 1 1 0 1   SAB IAB Ectopic Molar Multiple Live Births   0 0 0 0 0 1      # Outcome Date GA Lbr Slick/2nd Weight Sex Delivery Anes PTL Lv   3 Current            2  2018        FD      Complications: Stillborn, abnormal   1 Term 06/19/15 39w6d  3090 g (6 lb 13 oz) F Vag-Spont Pud N ROCHELLE     Past Medical History:   Diagnosis Date    Asthma     Gestational diabetes     Urogenital trichomoniasis      No past surgical history on file.  No current facility-administered medications for this encounter.    Allergies   Allergen Reactions    Morphine Hives     Chest pain, SOB, hives     Social History    Tobacco Use      Smoking status: Never      Smokeless tobacco: Never    Review of Systems   Genitourinary:  Positive for vaginal discharge.   All other systems reviewed and are negative.      Objective   BP 98/65 (BP Location: Right arm, Patient Position: Lying)   Pulse 93   Temp 98.8 °F (37.1 °C) (Oral)   Resp 15   Ht 175.3 cm (69\")   Wt 116 kg (256 lb)   LMP 2022 (Exact Date)   BMI 37.80 kg/m²   General: well developed; well " nourished  no acute distress   Abdomen: soft, non-tender; no masses  gravid    FHT's: reactive and category 1      Cervix: was checked (by RN): 0 cm / 50 % / -3   Presentation: transverse lie   Contractions: none   Chest: Unlabored respirations    CV:  RRR   Ext:   No C/C/E   Back: CVA tenderness is deferred bilateral        Prenatal Labs  Lab Results   Component Value Date    HGB 11.3 (L) 05/08/2023    RUBELLAABIGG 2.01 01/17/2023    HEPBSAG Negative 01/17/2023    ABORH O Positive 10/10/2018    ABSCRN Negative 02/04/2023    YQE3OOH7 Non Reactive 01/17/2023    HEPCVIRUSABY <0.1 01/17/2023     (H) 03/06/2023    URINECX Final report (A) 05/09/2023    CHLAMNAA Negative 01/17/2023    NGONORRHON Negative 01/17/2023       Current Labs Reviewed   UA:    Lab Results   Component Value Date    SQUAMEPIUA 3-6 (A) 06/04/2023    SPECGRAVUR 1.027 06/04/2023    KETONESU Trace (A) 06/04/2023    BLOODU Negative 06/04/2023    LEUKOCYTESUR Trace (A) 06/04/2023    NITRITEU Negative 06/04/2023    RBCUA None Seen 06/04/2023    WBCUA 0-2 06/04/2023    BACTERIA 1+ (A) 06/04/2023     ROM+ negative  Accucheck 70     Assessment   IUP at 28w1d  No evidence of PPROM  GDM- euglycemic     Plan   D/C home with PTL precautions.  Keep regularly scheduled prenatal appointments.  Return for worsening symptoms, acute changes.      Leonela Cunningham MD  6/4/2023  19:59 EDT

## 2023-06-05 ENCOUNTER — ROUTINE PRENATAL (OUTPATIENT)
Dept: OBSTETRICS AND GYNECOLOGY | Age: 28
End: 2023-06-05
Payer: COMMERCIAL

## 2023-06-05 VITALS — SYSTOLIC BLOOD PRESSURE: 124 MMHG | BODY MASS INDEX: 37.42 KG/M2 | WEIGHT: 253.4 LBS | DIASTOLIC BLOOD PRESSURE: 88 MMHG

## 2023-06-05 DIAGNOSIS — Z3A.28 28 WEEKS GESTATION OF PREGNANCY: ICD-10-CM

## 2023-06-05 DIAGNOSIS — O99.210 OBESITY AFFECTING PREGNANCY, ANTEPARTUM: Primary | ICD-10-CM

## 2023-06-05 DIAGNOSIS — Z13.0 SCREENING FOR IRON DEFICIENCY ANEMIA: ICD-10-CM

## 2023-06-05 PROBLEM — O24.415 GESTATIONAL DIABETES MELLITUS (GDM) CONTROLLED ON ORAL HYPOGLYCEMIC DRUG, ANTEPARTUM: Status: ACTIVE | Noted: 2018-10-13

## 2023-06-05 PROBLEM — O36.8990 UMBILICAL CORD CYST DURING PREGNANCY, ANTEPARTUM: Status: RESOLVED | Noted: 2023-01-17 | Resolved: 2023-06-05

## 2023-06-05 LAB
BACTERIA SPEC AEROBE CULT: NO GROWTH
GLUCOSE UR STRIP-MCNC: NEGATIVE MG/DL
PROT UR STRIP-MCNC: NEGATIVE MG/DL

## 2023-06-05 PROCEDURE — 90715 TDAP VACCINE 7 YRS/> IM: CPT | Performed by: OBSTETRICS & GYNECOLOGY

## 2023-06-05 PROCEDURE — 99213 OFFICE O/P EST LOW 20 MIN: CPT | Performed by: OBSTETRICS & GYNECOLOGY

## 2023-06-05 PROCEDURE — 90471 IMMUNIZATION ADMIN: CPT | Performed by: OBSTETRICS & GYNECOLOGY

## 2023-06-05 NOTE — PROGRESS NOTES
CC: OB visit  HPI: pt presents for routine visit. She reports she went to [facility] for possible leakage yesterday. They did not find evidence of ROM and she denies further leaking fluid. She did not bring her glucose log with her today. She reports she is taking metformin 1000 mg in AM and PM. She reports active fetal movement.     ROS:  Neuro: neg for severe headache or vision changes  : neg for ongoing leakage of fluid, neg for reg ctx or bleeding.     O: fh = 29 cm, FHT;s 158 bpm  Ext: no edema or cords    A/p: GDM: pt did not bring her glucose log. MFM is managing metformin. She notes fasting values are in 70-80's. She notes values after values after meals are between . Pt has f/u u/s booked with MFM next week. She had appropriate growth noted on 5/19 at [facility]. Recommend compliance with regular visits. She will need weekly BPP 32+ weeks. Plan is to stagger with MFM.     28 weeks: reviewed PTL warnings. Advised daily fmc's. Recommend tdap and pt agrees.     Increased bmi: pt is taking baby asa, reviewed preeclamptic warnings    Hx UTI: pt reports she is taking macrobid suppression. Ucx from [facility] is no growth to date. Reviewed pyelo warnings    Slight anemia with last hgb: check cbc/ferritin today    F/u 2 weeks or prn.

## 2023-06-06 LAB
BASOPHILS # BLD AUTO: 0.02 10*3/MM3 (ref 0–0.2)
BASOPHILS NFR BLD AUTO: 0.3 % (ref 0–1.5)
EOSINOPHIL # BLD AUTO: 0.09 10*3/MM3 (ref 0–0.4)
EOSINOPHIL NFR BLD AUTO: 1.1 % (ref 0.3–6.2)
ERYTHROCYTE [DISTWIDTH] IN BLOOD BY AUTOMATED COUNT: 12.6 % (ref 12.3–15.4)
FERRITIN SERPL-MCNC: 23.7 NG/ML (ref 13–150)
HCT VFR BLD AUTO: 35.8 % (ref 34–46.6)
HGB BLD-MCNC: 12.1 G/DL (ref 12–15.9)
IMM GRANULOCYTES # BLD AUTO: 0.03 10*3/MM3 (ref 0–0.05)
IMM GRANULOCYTES NFR BLD AUTO: 0.4 % (ref 0–0.5)
LYMPHOCYTES # BLD AUTO: 1.67 10*3/MM3 (ref 0.7–3.1)
LYMPHOCYTES NFR BLD AUTO: 21.2 % (ref 19.6–45.3)
MCH RBC QN AUTO: 28.5 PG (ref 26.6–33)
MCHC RBC AUTO-ENTMCNC: 33.8 G/DL (ref 31.5–35.7)
MCV RBC AUTO: 84.2 FL (ref 79–97)
MONOCYTES # BLD AUTO: 0.46 10*3/MM3 (ref 0.1–0.9)
MONOCYTES NFR BLD AUTO: 5.8 % (ref 5–12)
NEUTROPHILS # BLD AUTO: 5.62 10*3/MM3 (ref 1.7–7)
NEUTROPHILS NFR BLD AUTO: 71.2 % (ref 42.7–76)
NRBC BLD AUTO-RTO: 0 /100 WBC (ref 0–0.2)
PLATELET # BLD AUTO: 330 10*3/MM3 (ref 140–450)
RBC # BLD AUTO: 4.25 10*6/MM3 (ref 3.77–5.28)
WBC # BLD AUTO: 7.89 10*3/MM3 (ref 3.4–10.8)

## 2023-06-09 ENCOUNTER — PATIENT OUTREACH (OUTPATIENT)
Dept: LABOR AND DELIVERY | Facility: HOSPITAL | Age: 28
End: 2023-06-09
Payer: COMMERCIAL

## 2023-06-09 ENCOUNTER — TELEPHONE (OUTPATIENT)
Dept: OBSTETRICS AND GYNECOLOGY | Facility: CLINIC | Age: 28
End: 2023-06-09
Payer: COMMERCIAL

## 2023-06-09 NOTE — OUTREACH NOTE
[facility]  Unable to Reach       Questions/Answers      Flowsheet Row Responses   Pending Outreach Confirm Patient Interest   Call Attempt First   Outcome Left message   Next Call Attempt Date 06/12/23            Maty CLIFFORD - RN  Maternity Nurse Navigator    6/9/2023, 10:00 EDT

## 2023-06-09 NOTE — TELEPHONE ENCOUNTER
"Call placed to Jessy to discuss blood sugar logs. Jessy reports that she has been checking her blood sugars but she has not been writing them down. Reports that she has been going through a lot but states her glucose values have been \".\" Jessy notes that she has been able to eat better and is continuing to take her Metformin 100mg BID. Encouraged patient to write down her glucose values consistency starting now until next appointment on 6/16 for Dr. Woodward to review with her. Pt acknowledged understanding and states she will try to write them down. [facility] has not received blood sugar logs on patient since 5/19.   "

## 2023-06-12 ENCOUNTER — PATIENT OUTREACH (OUTPATIENT)
Dept: LABOR AND DELIVERY | Facility: HOSPITAL | Age: 28
End: 2023-06-12
Payer: COMMERCIAL

## 2023-06-12 NOTE — PROGRESS NOTES
MATERNAL FETAL MEDICINE Consult Note    Dear Dr. Marie,     Thank you for your kind referral of Jessy Hope.  As you know, she is a 28 y.o.   at  29 6/7 weeks gestation (Estimated Date of Delivery: 23). This is a consult.      Her antepartum course is complicated by:  GDMA2 on orals  Hx baby w T18  Umbilical cord cyst in 1st trimester, not seen today    HPI: Today, she denies headache, blurry vision, RUQ pain. No vaginal bleeding, no contractions.     Review of History:  Past Medical History:   Diagnosis Date    Asthma     Gestational diabetes     Urogenital trichomoniasis      History reviewed. No pertinent surgical history.      Social History     Socioeconomic History    Marital status: Single   Tobacco Use    Smoking status: Never     Passive exposure: Never    Smokeless tobacco: Never   Vaping Use    Vaping Use: Never used   Substance and Sexual Activity    Alcohol use: Not Currently    Drug use: Never    Sexual activity: Yes     Partners: Male     Birth control/protection: None     Family History   Problem Relation Age of Onset    Diabetes Mother     Hypertension Mother     Diabetes Father     Hypertension Father     Diabetes Brother     Leukemia Brother     Breast cancer Paternal Grandmother     Ovarian cancer Neg Hx     Uterine cancer Neg Hx     Colon cancer Neg Hx       Allergies   Allergen Reactions    Morphine Hives     Chest pain, SOB, hives      Current Outpatient Medications on File Prior to Visit   Medication Sig Dispense Refill    Acetaminophen (TYLENOL 8 HOUR PO) Take  by mouth.      aspirin 81 MG chewable tablet Chew 1 tablet Daily. 30 tablet 6    Blood Glucose Monitoring Suppl (FreeStyle Lite) w/Device kit       Continuous Blood Gluc Sensor (Dexcom G6 Sensor) Every 10 (Ten) Days. 60 each 6    Continuous Blood Gluc Transmit (Dexcom G6 Transmitter) misc 1 Device Every 30 (Thirty) Days. 30 each 8    glucose blood test strip Use as instructed to test glucose 4 to 7 times daily 200  "each 12    glucose monitor monitoring kit 1 each As Needed (use to test glucose 4 times daily). 1 each 0    Lancets 30G misc 1 Device 1 (One) Time As Needed (glucose monitoring) for up to 1 dose. For 4 to 7 times daily 200 each 12    metFORMIN ER (GLUCOPHAGE-XR) 500 MG 24 hr tablet Take 2 tablets by mouth 2 (Two) Times a Day. 60 tablet 7    nitrofurantoin, macrocrystal-monohydrate, (Macrobid) 100 MG capsule Take 1 capsule by mouth Every Night. 30 capsule 7    omeprazole (priLOSEC) 20 MG capsule Take 1 capsule by mouth Daily. 30 capsule 1    ondansetron ODT (ZOFRAN-ODT) 4 MG disintegrating tablet Place 1 tablet on the tongue Every 6 (Six) Hours As Needed for Nausea or Vomiting for up to 115 days. 60 tablet 5    Prenatal Vit-Fe Sulfate-FA-DHA (Prenatal Vitamin/Min +DHA) 27-0.8-200 MG capsule Take 1 tablet by mouth Daily. 30 capsule 11    vitamin B-6 (PYRIDOXINE) 25 MG tablet Take 1 tablet by mouth 3 (Three) Times a Day. 90 tablet 2    metFORMIN ER (GLUCOPHAGE-XR) 500 MG 24 hr tablet Take 1 tablet by mouth Daily With Breakfast & Dinner. (Patient not taking: Reported on 6/16/2023) 60 tablet 6     No current facility-administered medications on file prior to visit.        Past obstetric, gynecological, medical, surgical, family and social history reviewed.  Relevant lab work and imaging reviewed.    Review of systems  Constitutional:  denies fever, chills, malaise.   ENT/Mouth:  denies sore throat, tinnitis  Eyes: denies vision changes/pain  CV:  denies chest pain  Respiratory:  denies cough/SOB  GI:  denies N/V, diarrhea, abdominal pain.    :   denies dysuria  Skin:  denies lesions or pruritis   Neuro:  denies weakness, focal neurologic symptoms    Vitals:    06/16/23 1121   BP: 127/71   BP Location: Right arm   Patient Position: Sitting   Pulse: 95   Resp: 16   Temp: 97.5 °F (36.4 °C)   TempSrc: Temporal   Weight: 117 kg (258 lb 6.4 oz)   Height: 175.3 cm (69\")       PHYSICAL EXAM   GENERAL: Not in acute distress, " AAOx3, pleasant  CARDIO: regular rate and rhythm  PULM: symmetric chest rise, speaking in complete sentences without difficulty  NEURO: awake, alert and oriented to person, place, and time  ABDOMINAL: No fundal tenderness, no rebound or guarding, gravid  EXTREMITIES: no bilateral lower extremity edema/tenderness  SKIN: Warm, well-perfused      ULTRASOUND   Please view full ultrasound note on Imaging tab in ViewPoint.  Cephalic presentation.  Anterior placenta.  CHARAN 23.6 cm, which is high normal.   EFW 1576 g (59%, AC 69%)  BPP     ASSESSMENT/COUNSELIN y.o.   at  29 6/7 weeks gestation (Estimated Date of Delivery: 23).     -Pregnancy  [ X ] stable  [   ] improving [  ] worsening    Diagnoses and all orders for this visit:    1. Gestational diabetes mellitus (GDM), antepartum, gestational diabetes method of control unspecified (Primary)  -     [facility]; Standing    Other orders  -     glyburide (DIAbeta) 1.25 MG tablet; Take 1 tablet by mouth Daily With Breakfast.  Dispense: 30 tablet; Refill: 5       GDMA2  Previously counseled:  Counseling: I explained to her that patients with diabetes have a variety of pregnancy related risks that are related to their level of glycemic control.  We discussed at length the increased risks related to diabetes including but not limited to congenital anomalies, fetal growth disorders (FGR or LGA), indicated or spontaneous  birth, macrosomia, polyhydramnios, birth trauma,  complications (jaundice, hypoglycemia, NICU admit) and stillbirth with uncontrolled glucose.       She did bring her most recent log.  She is on [id number] BID.  I added glyburide 1.25 mg in AM with breakfast.  We discussed her snacks and eating protein with her afternoon snack.  Discussed hypoglycemia precautions.  Her fastings are okay overall so will just do glyburide during the day to help with preprandials.     We discussed diet recommendations,  and goal pre-prandial values below 90 and 1-hour post-prandial values below 120 to optimize her pregnancy outcome as many studies have demonstrated that this is the normal range for pregnant women without diabetes.  She will send sugars on .  We discussed meal timing and eating every 2-3 hours with 30g carbs at breakfast, 45 for lunch and dinner, and 10-15g snacks in between and at bedtime.      A1c was 5.8 so no ECHO ordered.  Her heart views have been normal.  Would recommend 2 hour GTT at postpartum visit to rule out T2DM.  Has had a GI bug but able to eat and do better now.      FH trisomy 18:  Anatomy normal today and low risk NIPT.        Recommendations:  -PIH baseline labs noted and normal  -Continue ADA diet with carbohydrate counting.  Has seen diabetic counselors  -Continue glucose monitoring 6-7 times daily as instructed.  -Continue medications: on Metformin--added glyburide in AM  -Ultrasound for fetal growth every 4 weeks  - testing at 32 weeks gestation--needs to be scheduled on off weeks with me with Dr. Marie.    -Continue routine prenatal care with primary ob team   -At present goal for delivery is 39 weeks       Follow-up: 3weeks growth, send sugars weekly. After that will be every other week    Thank you for the consult and opportunity to care for this patient.  Please feel free to reach out with any questions or concerns.      I spent 10 minutes caring for this patient on this date of service. This time includes time spent by me in the following activities: preparing for the visit, reviewing tests, obtaining and/or reviewing a separately obtained history, performing a medically appropriate examination and/or evaluation, counseling and educating the patient/family/caregiver and independently interpreting results and communicating that information with the patient/family/caregiver with greater than 50% spent in counseling and coordination of care.       I spent 5 minutes on the  separately reported service of US imaging not included in the time used to support the E/M service also reported today.      Nayeli Woodward MD [facility]  Maternal Fetal Medicine-[facility]  Office: [phone]  [email or web address]

## 2023-06-12 NOTE — OUTREACH NOTE
[facility]  Enrollment    Current Estimated Gestational Age: 29w2d    Questions/Answers    Flowsheet Row Responses   Would like to participate? 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 Yes   Dentist Name Sandra Day   Resources Presently Utilizing: WIC (Women, Infant, Children)   Maternal Warning Signs Provided   Other Education Insurance benefits/Incentives, WIC Benefits          Learning Assessment    Flowsheet Row Responses   Relationship Patient   Learner Name Jessy   Does the learner have any barriers to learning? No Barriers   What is the preferred language of the learner for medical teaching? English   Is an  required? No   How does the learner prefer to learn new concepts? 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 ok   May I ask you questions about your substance use? Yes   Other Comment denies   Supplies ready for baby Car Seat, Clothing, Crib   Resource/Environmental Concerns None   Do you have any questions related to your care experience, your pregnancy, plans for delivery, any concerns, etc? No   Other Education Insurance benefits/Incentives, WIC Benefits        Intake completed today. Pt states that she is doing ok. It has been stressful to manage the pregnancy and diabetes while working. The  LOULOU is not currently involved or supportive. She currently lives with her sister and reports stable housing and reliable transportation. She reports some depressive emotions related to the situation with the FOB and her pregnancy loss. Discussed therapy options and sent resources. She also describes that the nursing staff was not responsive or supportive during her inpatient stay for her most recent delivery. Suggested a  and sent resources. She has most necessary infant items, reviewed ordering a breast pump. Reviewed maternal warning signs and fetal kick counts. F/u in one month.      Maty Fields RN  Maternity Nurse Navigator    6/12/2023, 14:44 EDT

## 2023-06-16 ENCOUNTER — HOSPITAL ENCOUNTER (OUTPATIENT)
Dept: ULTRASOUND IMAGING | Facility: HOSPITAL | Age: 28
Discharge: HOME | End: 2023-06-16
Payer: COMMERCIAL

## 2023-06-16 ENCOUNTER — OFFICE VISIT (OUTPATIENT)
Dept: OBSTETRICS AND GYNECOLOGY | Facility: CLINIC | Age: 28
End: 2023-06-16
Payer: COMMERCIAL

## 2023-06-16 VITALS
SYSTOLIC BLOOD PRESSURE: 127 MMHG | RESPIRATION RATE: 16 BRPM | WEIGHT: 258.4 LBS | TEMPERATURE: 97.5 F | HEIGHT: 69 IN | HEART RATE: 95 BPM | DIASTOLIC BLOOD PRESSURE: 71 MMHG | BODY MASS INDEX: 38.27 KG/M2

## 2023-06-16 DIAGNOSIS — O24.419 GESTATIONAL DIABETES MELLITUS (GDM), ANTEPARTUM, GESTATIONAL DIABETES METHOD OF CONTROL UNSPECIFIED: Primary | ICD-10-CM

## 2023-06-16 DIAGNOSIS — O24.410 DIET CONTROLLED GESTATIONAL DIABETES MELLITUS (GDM), ANTEPARTUM: ICD-10-CM

## 2023-06-16 LAB — GLUCOSE BLDC GLUCOMTR-MCNC: 96 MG/DL (ref 70–130)

## 2023-06-16 PROCEDURE — 76819 FETAL BIOPHYS PROFIL W/O NST: CPT

## 2023-06-16 PROCEDURE — 76816 OB US FOLLOW-UP PER FETUS: CPT

## 2023-06-16 PROCEDURE — 82948 REAGENT STRIP/BLOOD GLUCOSE: CPT

## 2023-06-16 RX ORDER — GLYBURIDE 1.25 MG/1
1.25 TABLET ORAL
Qty: 30 TABLET | Refills: 5 | Status: SHIPPED | OUTPATIENT
Start: 2023-06-16

## 2023-06-16 NOTE — LETTER
2023       No Recipients    Patient: Jessy Hope   YOB: 1995   Date of Visit: 2023       Dear Liv Marie MD:    Thank you for referring Jessy Hope to me for evaluation. Below are the relevant portions of my assessment and plan of care.    Encounter Diagnosis and Orders:  Diagnoses and all orders for this visit:    1. Gestational diabetes mellitus (GDM), antepartum, gestational diabetes method of control unspecified (Primary)  -     [facility]; Standing    Other orders  -     glyburide (DIAbeta) 1.25 MG tablet; Take 1 tablet by mouth Daily With Breakfast.  Dispense: 30 tablet; Refill: 5        If you have questions, please do not hesitate to call me. I look forward to following Jessy along with you.         Sincerely,        Nayeli Woodward MD      MATERNAL FETAL MEDICINE Consult Note    Dear Dr. Marie,     Thank you for your kind referral of Jessy Hope.  As you know, she is a 28 y.o.   at  29 6/7 weeks gestation (Estimated Date of Delivery: 23). This is a consult.      Her antepartum course is complicated by:  GDMA2 on orals  Hx baby w T18  Umbilical cord cyst in 1st trimester, not seen today    HPI: Today, she denies headache, blurry vision, RUQ pain. No vaginal bleeding, no contractions.     Review of History:  Past Medical History:   Diagnosis Date   • Asthma    • Gestational diabetes    • Urogenital trichomoniasis      History reviewed. No pertinent surgical history.      Social History     Socioeconomic History   • Marital status: Single   Tobacco Use   • Smoking status: Never     Passive exposure: Never   • Smokeless tobacco: Never   Vaping Use   • Vaping Use: Never used   Substance and Sexual Activity   • Alcohol use: Not Currently   • Drug use: Never   • Sexual activity: Yes     Partners: Male     Birth control/protection: None     Family History   Problem Relation Age of Onset   • Diabetes Mother    • Hypertension  Mother    • Diabetes Father    • Hypertension Father    • Diabetes Brother    • Leukemia Brother    • Breast cancer Paternal Grandmother    • Ovarian cancer Neg Hx    • Uterine cancer Neg Hx    • Colon cancer Neg Hx       Allergies   Allergen Reactions   • Morphine Hives     Chest pain, SOB, hives      Current Outpatient Medications on File Prior to Visit   Medication Sig Dispense Refill   • Acetaminophen (TYLENOL 8 HOUR PO) Take  by mouth.     • aspirin 81 MG chewable tablet Chew 1 tablet Daily. 30 tablet 6   • Blood Glucose Monitoring Suppl (FreeStyle Lite) w/Device kit      • Continuous Blood Gluc Sensor (Dexcom G6 Sensor) Every 10 (Ten) Days. 60 each 6   • Continuous Blood Gluc Transmit (Dexcom G6 Transmitter) misc 1 Device Every 30 (Thirty) Days. 30 each 8   • glucose blood test strip Use as instructed to test glucose 4 to 7 times daily 200 each 12   • glucose monitor monitoring kit 1 each As Needed (use to test glucose 4 times daily). 1 each 0   • Lancets 30G misc 1 Device 1 (One) Time As Needed (glucose monitoring) for up to 1 dose. For 4 to 7 times daily 200 each 12   • metFORMIN ER (GLUCOPHAGE-XR) 500 MG 24 hr tablet Take 2 tablets by mouth 2 (Two) Times a Day. 60 tablet 7   • nitrofurantoin, macrocrystal-monohydrate, (Macrobid) 100 MG capsule Take 1 capsule by mouth Every Night. 30 capsule 7   • omeprazole (priLOSEC) 20 MG capsule Take 1 capsule by mouth Daily. 30 capsule 1   • ondansetron ODT (ZOFRAN-ODT) 4 MG disintegrating tablet Place 1 tablet on the tongue Every 6 (Six) Hours As Needed for Nausea or Vomiting for up to 115 days. 60 tablet 5   • Prenatal Vit-Fe Sulfate-FA-DHA (Prenatal Vitamin/Min +DHA) 27-0.8-200 MG capsule Take 1 tablet by mouth Daily. 30 capsule 11   • vitamin B-6 (PYRIDOXINE) 25 MG tablet Take 1 tablet by mouth 3 (Three) Times a Day. 90 tablet 2   • metFORMIN ER (GLUCOPHAGE-XR) 500 MG 24 hr tablet Take 1 tablet by mouth Daily With Breakfast & Dinner. (Patient not taking: Reported on  "2023) 60 tablet 6     No current facility-administered medications on file prior to visit.        Past obstetric, gynecological, medical, surgical, family and social history reviewed.  Relevant lab work and imaging reviewed.    Review of systems  Constitutional:  denies fever, chills, malaise.   ENT/Mouth:  denies sore throat, tinnitis  Eyes: denies vision changes/pain  CV:  denies chest pain  Respiratory:  denies cough/SOB  GI:  denies N/V, diarrhea, abdominal pain.    :   denies dysuria  Skin:  denies lesions or pruritis   Neuro:  denies weakness, focal neurologic symptoms    Vitals:    23 1121   BP: 127/71   BP Location: Right arm   Patient Position: Sitting   Pulse: 95   Resp: 16   Temp: 97.5 °F (36.4 °C)   TempSrc: Temporal   Weight: 117 kg (258 lb 6.4 oz)   Height: 175.3 cm (69\")       PHYSICAL EXAM   GENERAL: Not in acute distress, AAOx3, pleasant  CARDIO: regular rate and rhythm  PULM: symmetric chest rise, speaking in complete sentences without difficulty  NEURO: awake, alert and oriented to person, place, and time  ABDOMINAL: No fundal tenderness, no rebound or guarding, gravid  EXTREMITIES: no bilateral lower extremity edema/tenderness  SKIN: Warm, well-perfused      ULTRASOUND   Please view full ultrasound note on Imaging tab in ViewPoint.  Cephalic presentation.  Anterior placenta.  CHARAN 23.6 cm, which is high normal.   EFW 1576 g (59%, AC 69%)  BPP 8/    ASSESSMENT/COUNSELIN y.o.   at  29 6/7 weeks gestation (Estimated Date of Delivery: 23).     -Pregnancy  [ X ] stable  [   ] improving [  ] worsening    Diagnoses and all orders for this visit:    1. Gestational diabetes mellitus (GDM), antepartum, gestational diabetes method of control unspecified (Primary)  -     [facility]; Standing    Other orders  -     glyburide (DIAbeta) 1.25 MG tablet; Take 1 tablet by mouth Daily With Breakfast.  Dispense: 30 tablet; Refill: 5       GDMA2  Previously " counseled:  Counseling: I explained to her that patients with diabetes have a variety of pregnancy related risks that are related to their level of glycemic control.  We discussed at length the increased risks related to diabetes including but not limited to congenital anomalies, fetal growth disorders (FGR or LGA), indicated or spontaneous  birth, macrosomia, polyhydramnios, birth trauma,  complications (jaundice, hypoglycemia, NICU admit) and stillbirth with uncontrolled glucose.       She did bring her most recent log.  She is on [id number] BID.  I added glyburide 1.25 mg in AM with breakfast.  We discussed her snacks and eating protein with her afternoon snack.  Discussed hypoglycemia precautions.  Her fastings are okay overall so will just do glyburide during the day to help with preprandials.     We discussed diet recommendations, and goal pre-prandial values below 90 and 1-hour post-prandial values below 120 to optimize her pregnancy outcome as many studies have demonstrated that this is the normal range for pregnant women without diabetes.  She will send sugars on .  We discussed meal timing and eating every 2-3 hours with 30g carbs at breakfast, 45 for lunch and dinner, and 10-15g snacks in between and at bedtime.      A1c was 5.8 so no ECHO ordered.  Her heart views have been normal.  Would recommend 2 hour GTT at postpartum visit to rule out T2DM.  Has had a GI bug but able to eat and do better now.      FH trisomy 18:  Anatomy normal today and low risk NIPT.        Recommendations:  -PIH baseline labs noted and normal  -Continue ADA diet with carbohydrate counting.  Has seen diabetic counselors  -Continue glucose monitoring 6-7 times daily as instructed.  -Continue medications: on Metformin--added glyburide in AM  -Ultrasound for fetal growth every 4 weeks  - testing at 32 weeks gestation--needs to be scheduled on off weeks with me with Dr. Marie.    -Continue routine  prenatal care with primary ob team   -At present goal for delivery is 39 weeks       Follow-up: 3weeks growth, send sugars weekly. After that will be every other week    Thank you for the consult and opportunity to care for this patient.  Please feel free to reach out with any questions or concerns.      I spent 10 minutes caring for this patient on this date of service. This time includes time spent by me in the following activities: preparing for the visit, reviewing tests, obtaining and/or reviewing a separately obtained history, performing a medically appropriate examination and/or evaluation, counseling and educating the patient/family/caregiver and independently interpreting results and communicating that information with the patient/family/caregiver with greater than 50% spent in counseling and coordination of care.       I spent 5 minutes on the separately reported service of US imaging not included in the time used to support the E/M service also reported today.      Nayeli Woodward MD FACOG  Maternal Fetal Medicine-[facility]  Office: [phone]  [email or web address]

## 2023-06-16 NOTE — PROGRESS NOTES
Pt reports that she is doing well and denies vaginal bleeding, cramping, contractions or LOF at this time. Reports active fetal movement. Reviewed when to call OB office or present to L&D for evaluation with symptoms such as decreased fetal movement, vaginal bleeding, LOF or ctxs. Pt verbalized understanding. Denies HA, visual changes or epigastric pain. Denies any additional complaints at time of appointment.     Vitals:    06/16/23 1121   BP: 127/71   Pulse: 95   Resp: 16   Temp: 97.5 °F (36.4 °C)

## 2023-06-20 PROBLEM — O20.9 VAGINAL BLEEDING AFFECTING EARLY PREGNANCY: Status: RESOLVED | Noted: 2023-02-07 | Resolved: 2023-06-20

## 2023-06-27 PROBLEM — O40.3XX0 POLYHYDRAMNIOS IN THIRD TRIMESTER: Status: ACTIVE | Noted: 2023-06-27

## 2023-07-14 PROBLEM — Z34.90 PREGNANCY: Status: ACTIVE | Noted: 2023-07-14

## 2023-07-26 ENCOUNTER — HOSPITAL ENCOUNTER (EMERGENCY)
Facility: HOSPITAL | Age: 28
Discharge: HOME | End: 2023-07-26
Attending: OBSTETRICS & GYNECOLOGY | Admitting: OBSTETRICS & GYNECOLOGY
Payer: COMMERCIAL

## 2023-07-26 VITALS
SYSTOLIC BLOOD PRESSURE: 107 MMHG | RESPIRATION RATE: 18 BRPM | TEMPERATURE: 98.4 F | BODY MASS INDEX: 39.43 KG/M2 | HEIGHT: 69 IN | HEART RATE: 92 BPM | WEIGHT: 266.2 LBS | DIASTOLIC BLOOD PRESSURE: 63 MMHG | OXYGEN SATURATION: 100 %

## 2023-07-26 LAB
BACTERIA UR QL AUTO: ABNORMAL /HPF
BILIRUB UR QL STRIP: NEGATIVE
CLARITY UR: ABNORMAL
COLOR UR: YELLOW
GLUCOSE UR STRIP-MCNC: NEGATIVE MG/DL
HGB UR QL STRIP.AUTO: NEGATIVE
HYALINE CASTS UR QL AUTO: ABNORMAL /LPF
KETONES UR QL STRIP: ABNORMAL
LEUKOCYTE ESTERASE UR QL STRIP.AUTO: ABNORMAL
NITRITE UR QL STRIP: NEGATIVE
PH UR STRIP.AUTO: 6 [PH] (ref 5–8)
PROT UR QL STRIP: ABNORMAL
RBC # UR STRIP: ABNORMAL /HPF
REF LAB TEST METHOD: ABNORMAL
SP GR UR STRIP: 1.02 (ref 1–1.03)
SQUAMOUS #/AREA URNS HPF: ABNORMAL /HPF
UROBILINOGEN UR QL STRIP: ABNORMAL
WBC # UR STRIP: ABNORMAL /HPF

## 2023-07-26 PROCEDURE — 81001 URINALYSIS AUTO W/SCOPE: CPT | Performed by: OBSTETRICS & GYNECOLOGY

## 2023-07-26 PROCEDURE — 59025 FETAL NON-STRESS TEST: CPT

## 2023-07-26 PROCEDURE — 87086 URINE CULTURE/COLONY COUNT: CPT | Performed by: OBSTETRICS & GYNECOLOGY

## 2023-07-26 PROCEDURE — 99284 EMERGENCY DEPT VISIT MOD MDM: CPT | Performed by: OBSTETRICS & GYNECOLOGY

## 2023-07-27 LAB — BACTERIA SPEC AEROBE CULT: NORMAL

## 2023-07-27 NOTE — OBED NOTES
[facility] Note OB    Patient Name: Jessy Hope  YOB: 1995  MRN: [id number]  Admission Date: 2023  7:21 PM  Date of Service: 2023    Chief Complaint: Contractions (OBED_ pt reports having contractions starting at 0200 and have increased in intensity and frequency throughout the day. Curently pt reports contractions have spaced out since leaving work at 1630 but she did notice 1 episode of pink spotting at that same time. +FM; denies LOF.)        Subjective     Jessy Hope is a 28 y.o. female  at 35w4d with Estimated Date of Delivery: 23 who presents with the chief complaint listed above. Pt denies recent intercourse, cervical exam or abdominal trauma     She sees Liv Marie MD for her prenatal care. Her pregnancy has been complicated by:  UTIs, GDM, Has, asthma, anemia, GERD, obese, polyhydramnious    She describes fetal movement as normal.  She denies rupture of membranes.  She denies active vaginal bleeding. She is feeling contractions.        Objective   Patient Active Problem List    Diagnosis     Pregnancy [Z34.90]     Polyhydramnios in third trimester [O40.3XX0]     Urinary tract infection in mother during pregnancy, antepartum [O23.40]     ASCUS with positive high risk HPV cervical [R87.610, R87.810]     Family history of trisomy 18 [Z82.79]     Increased BMI [R63.8]     Gestational diabetes mellitus (GDM) controlled on oral hypoglycemic drug, antepartum [O24.415]     Obesity affecting pregnancy, antepartum [O99.210]         OB History    Para Term  AB Living   3 2 1 1 0 1   SAB IAB Ectopic Molar Multiple Live Births   0 0 0 0 0 1      # Outcome Date GA Lbr Slick/2nd Weight Sex Delivery Anes PTL Lv   3 Current            2  2018        FD      Complications: Stillborn, abnormal   1 Term 06/19/15 39w6d  3090 g (6 lb 13 oz) F Vag-Spont Pud N ROCHELLE        Past Medical History:   Diagnosis Date    Asthma     GERD (gastroesophageal reflux  disease)     Gestational diabetes     Headache 2023    with current pregnancy    Urogenital trichomoniasis        History reviewed. No pertinent surgical history.    No current facility-administered medications on file prior to encounter.     Current Outpatient Medications on File Prior to Encounter   Medication Sig Dispense Refill    Acetaminophen (TYLENOL 8 HOUR PO) Take  by mouth.      aspirin 81 MG chewable tablet Chew 1 tablet Daily. 30 tablet 6    magnesium oxide (MAG-OX) 400 MG tablet Take 1 tablet by mouth Daily. 60 tablet 6    metFORMIN ER (GLUCOPHAGE-XR) 500 MG 24 hr tablet Take 2 tablets by mouth 2 (Two) Times a Day. 60 tablet 7    nitrofurantoin, macrocrystal-monohydrate, (Macrobid) 100 MG capsule Take 1 capsule by mouth Every Night. 30 capsule 7    omeprazole (priLOSEC) 20 MG capsule Take 1 capsule by mouth Daily. 30 capsule 1    Prenatal Vit-Fe Sulfate-FA-DHA (Prenatal Vitamin/Min +DHA) 27-0.8-200 MG capsule Take 1 tablet by mouth Daily. 30 capsule 11    vitamin B-6 (PYRIDOXINE) 25 MG tablet Take 1 tablet by mouth 3 (Three) Times a Day. 90 tablet 2    Blood Glucose Monitoring Suppl (FreeStyle Lite) w/Device kit       Continuous Blood Gluc Sensor (Dexcom G6 Sensor) Every 10 (Ten) Days. (Patient not taking: Reported on 7/7/2023) 60 each 6    Continuous Blood Gluc Transmit (Dexcom G6 Transmitter) misc 1 Device Every 30 (Thirty) Days. (Patient not taking: Reported on 7/7/2023) 30 each 8    glucose blood test strip Use as instructed to test glucose 4 to 7 times daily 200 each 12    glucose monitor monitoring kit 1 each As Needed (use to test glucose 4 times daily). 1 each 0    glyburide (DIAbeta) 1.25 MG tablet Take 1 tablet by mouth Daily With Breakfast. (Patient not taking: Reported on 7/21/2023) 30 tablet 5    Lancets 30G misc 1 Device 1 (One) Time As Needed (glucose monitoring) for up to 1 dose. For 4 to 7 times daily 200 each 12    ondansetron ODT (ZOFRAN-ODT) 4 MG disintegrating tablet Place 1 tablet  "on the tongue Every 6 (Six) Hours As Needed for Nausea or Vomiting for up to 115 days. (Patient not taking: Reported on 7/21/2023) 60 tablet 5       Allergies   Allergen Reactions    Morphine Hives     Chest pain, SOB, hives       Family History   Problem Relation Age of Onset    Diabetes Mother     Hypertension Mother     Diabetes Father     Hypertension Father     Diabetes Brother     Leukemia Brother     Breast cancer Paternal Grandmother     Ovarian cancer Neg Hx     Uterine cancer Neg Hx     Colon cancer Neg Hx        Social History     Socioeconomic History    Marital status: Single   Tobacco Use    Smoking status: Never     Passive exposure: Never    Smokeless tobacco: Never   Vaping Use    Vaping Use: Never used   Substance and Sexual Activity    Alcohol use: Not Currently    Drug use: Never    Sexual activity: Yes     Partners: Male     Birth control/protection: None           Review of Systems   Constitutional:  Negative for chills and fever.   HENT: Negative.     Eyes:  Negative for photophobia and visual disturbance.   Respiratory:  Negative for shortness of breath.    Cardiovascular:  Negative for chest pain.   Gastrointestinal:  Positive for abdominal pain. Negative for nausea.   Genitourinary:  Positive for vaginal discharge.   Psychiatric/Behavioral:  The patient is not nervous/anxious.         PHYSICAL EXAM:      VITAL SIGNS:  Vitals:    07/26/23 1924 07/26/23 1938 07/26/23 2005   BP:  110/68    BP Location:  Right arm    Patient Position:  Sitting    Pulse:  98    Resp:  18    Temp:  98.4 °F (36.9 °C)    TempSrc:  Oral    SpO2:  99%    Weight: 121 kg (266 lb 3.2 oz)     Height:   175.3 cm (69\")            FHT'S:                       Baseline:         Fetal HR Baseline: normal range                   Beats/min:       Fetal HR (beats/min): 140        Variability:        Fetal HR Variability: moderate (amplitude range 6 to 25 bpm)  Accelerations:  Fetal HR Accelerations: greater than/equal to 15 bpm, " lasting at least 15 seconds  Decelerations:  Fetal HR Decelerations: absent      Interpretation:  reassuring and category 1                                     PHYSICAL EXAM:      General: well developed; well nourished  no acute distress   Heart: Not performed.   Lungs   breathing is unlabored   Abdomen: Gravid and non tender     Extremities: trace edema, DTRs 1 plus, no clonus       Cervix: Per RN, no blood  Cervical Dilation (cm): 0-1  Cervical Effacement: 30%  Fetal Station: -3  Cervical Consistency: medium  Cervical Position: posterior     Contractions:   irregular                    LABS AND TESTING ORDERED:  Uterine and fetal monitoring  Urinalysis  Serial cervical exams    LAB RESULTS:    Recent Results (from the past 24 hour(s))   Urinalysis With Culture If Indicated - Urine, Clean Catch    Collection Time: 07/26/23  7:59 PM    Specimen: Urine, Clean Catch   Result Value Ref Range    Color, UA Yellow Yellow, Straw    Appearance, UA Cloudy (A) Clear    pH, UA 6.0 5.0 - 8.0    Specific Gravity, UA 1.017 1.005 - 1.030    Glucose, UA Negative Negative    Ketones, UA Trace (A) Negative    Bilirubin, UA Negative Negative    Blood, UA Negative Negative    Protein, UA Trace (A) Negative    Leuk Esterase, UA Small (1+) (A) Negative    Nitrite, UA Negative Negative    Urobilinogen, UA 1.0 E.U./dL 0.2 - 1.0 E.U./dL       Lab Results   Component Value Date    ABO O 02/04/2023    RH Positive 02/04/2023       No results found for: STREPGPB              External Prenatal Results       Pregnancy Outside Results - Transcribed From Office Records - See Scanned Records For Details       Test Value Date Time    ABO  O  02/04/23 2320    Rh  Positive  02/04/23 2320    Antibody Screen  Negative  02/04/23 2320       Negative  01/17/23 1354    Varicella IgG ^ had infection  02/07/23     Rubella  2.01 index 01/17/23 1354    Hgb  10.4 g/dL 07/14/23 1343       12.1 g/dL 06/05/23 0946       11.3 g/dL 05/08/23 0225       11.8 g/dL  23 0939       11.1 g/dL 23 1026       10.9 g/dL 23 0441       12.9 g/dL 23 2137       11.8 g/dL 23 0010       12.5 g/dL 23 0940       12.4 g/dL 23 1354      ^ 12.0 g/dL 22 1501       12.0 g/dL 22 0339       12.6 g/dL 22 1432    Hct  30.1 % 23 1343       35.8 % 23 0946       34.5 % 23 0225       36.4 % 23 0939       33.3 % 23 1026       32.1 % 23 0441       39.0 % 23 2137       34.4 % 23 0010       38.5 % 23 0940       36.9 % 23 1354      ^ 34.9 % 22 1501       35.3 % 22 0339       38.3 % 22 1432    Glucose Fasting GTT       Glucose Tolerance Test 1 hour       Glucose Tolerance Test 3 hour       Gonorrhea (discrete)  Negative  23 1324    Chlamydia (discrete)  Negative  23 1324    RPR  Non Reactive  23 1354    VDRL       Syphilis Antibody ^ <0.10 (index_val) 10/10/18 1043    HBsAg  Negative  23 1354    Herpes Simplex Virus PCR       Herpes Simplex VIrus Culture       HIV  Non Reactive  23 1354    Hep C RNA Quant PCR       Hep C Antibody  <0.1 s/co ratio 23 1354    AFP  16.3 ng/mL 23 1026    Group B Strep       GBS Susceptibility to Clindamycin       GBS Susceptibility to Erythromycin       Fetal Fibronectin       Genetic Testing, Maternal Blood                 Drug Screening       Test Value Date Time    Urine Drug Screen       Amphetamine Screen ^ Negative (arb'U) 10/10/18 1043    Barbiturate Screen       Benzodiazepine Screen       Methadone Screen       Phencyclidine Screen       Opiates Screen ^ Negative  10/10/18 1043    THC Screen       Cocaine Screen       Propoxyphene Screen       Buprenorphine Screen       Methamphetamine Screen       Oxycodone Screen       Tricyclic Antidepressants Screen                 Legend    ^: Historical                              Impression:   @ 35w4d .  Final Diagnosis: pt report vaginal spotting,  no active bleeding, OB exam benign    Plan:  1. Discharge to home.    2. Plan of care has been reviewed with patient along with risks, benefits of treatment.   All questions have been answered. Call health care provider for any further concerns and keep office appointments.  3. Labor precautions , bleeding precautions. Comfort measures      I discussed the patients findings and my recommendations with patient, family, and nursing staff      Conchita Guidry MD  7/26/2023  20:28 EDT

## 2023-07-28 ENCOUNTER — ROUTINE PRENATAL (OUTPATIENT)
Dept: OBSTETRICS AND GYNECOLOGY | Age: 28
End: 2023-07-28
Payer: COMMERCIAL

## 2023-07-28 VITALS — WEIGHT: 267 LBS | BODY MASS INDEX: 39.43 KG/M2 | DIASTOLIC BLOOD PRESSURE: 74 MMHG | SYSTOLIC BLOOD PRESSURE: 114 MMHG

## 2023-07-28 DIAGNOSIS — Z36.85 SCREENING, ANTENATAL, FOR STREPTOCOCCUS B: ICD-10-CM

## 2023-07-28 DIAGNOSIS — Z3A.35 35 WEEKS GESTATION OF PREGNANCY: Primary | ICD-10-CM

## 2023-07-28 LAB
GLUCOSE UR STRIP-MCNC: ABNORMAL MG/DL
PROT UR STRIP-MCNC: NEGATIVE MG/DL

## 2023-07-28 NOTE — PROGRESS NOTES
CC: OB visit and u/s  HPI: pt presents for routine visit. She went to L&D for ctx on Wednesday. She was stable without signs of labor. She notes active fetal movement and denies ctx since that time.    ROS:  Neuro: neg for headache or vision changes  Pulm: neg for soa  GI: neg for abd pain  : neg for reg ctx, vag bleeding/leaking fluid      U/s: BPP 8/8, ankur 14  Glucose log reviewed: fasting 72 to 92; 1 hr pp highest 144, most in range except after dinner    Fh= 35 cm, FHT's 124 bpm  Ext: no edema or cords  Cervix: ft/50/-3    A/p: GDM: glucose reasonably well controlled with metformin. She is managed by MFM. Will continue daily fmc's and weekly BPP    Recurrent UTI: stable on macrobid suppression    35 weeks: GBS done, reviewed labor warnings.

## 2023-07-30 LAB — GP B STREP DNA SPEC QL NAA+PROBE: NEGATIVE

## 2023-08-01 ENCOUNTER — HOSPITAL ENCOUNTER (INPATIENT)
Facility: HOSPITAL | Age: 28
LOS: 3 days | Discharge: HOME | End: 2023-08-04
Attending: OBSTETRICS & GYNECOLOGY | Admitting: OBSTETRICS & GYNECOLOGY
Payer: COMMERCIAL

## 2023-08-01 ENCOUNTER — ANESTHESIA EVENT (OUTPATIENT)
Dept: LABOR AND DELIVERY | Facility: HOSPITAL | Age: 28
End: 2023-08-01
Payer: COMMERCIAL

## 2023-08-01 ENCOUNTER — ANESTHESIA (OUTPATIENT)
Dept: LABOR AND DELIVERY | Facility: HOSPITAL | Age: 28
End: 2023-08-01
Payer: COMMERCIAL

## 2023-08-01 ENCOUNTER — ROUTINE PRENATAL (OUTPATIENT)
Dept: OBSTETRICS AND GYNECOLOGY | Age: 28
End: 2023-08-01
Payer: COMMERCIAL

## 2023-08-01 VITALS — WEIGHT: 267.8 LBS | BODY MASS INDEX: 39.55 KG/M2 | DIASTOLIC BLOOD PRESSURE: 92 MMHG | SYSTOLIC BLOOD PRESSURE: 128 MMHG

## 2023-08-01 DIAGNOSIS — O36.8390 FETAL HEART RATE DECELERATIONS AFFECTING MANAGEMENT OF MOTHER: ICD-10-CM

## 2023-08-01 DIAGNOSIS — O40.3XX1 POLYHYDRAMNIOS IN THIRD TRIMESTER COMPLICATION, FETUS 1 OF MULTIPLE GESTATION: ICD-10-CM

## 2023-08-01 DIAGNOSIS — Z3A.36 36 WEEKS GESTATION OF PREGNANCY: Primary | ICD-10-CM

## 2023-08-01 DIAGNOSIS — O28.3 ABNORMAL ANTENATAL ULTRASOUND: ICD-10-CM

## 2023-08-01 LAB
ABO GROUP BLD: NORMAL
ALBUMIN SERPL-MCNC: 3.7 G/DL (ref 3.5–5.2)
ALBUMIN/GLOB SERPL: 1.3 G/DL
ALP SERPL-CCNC: 69 U/L (ref 39–117)
ALT SERPL W P-5'-P-CCNC: 10 U/L (ref 1–33)
ANION GAP SERPL CALCULATED.3IONS-SCNC: 14.2 MMOL/L (ref 5–15)
AST SERPL-CCNC: 11 U/L (ref 1–32)
ATMOSPHERIC PRESS: 751.1 MMHG
ATMOSPHERIC PRESS: 753.7 MMHG
BASE EXCESS BLDCOA CALC-SCNC: -0.5 MMOL/L (ref -2–2)
BASE EXCESS BLDCOV CALC-SCNC: -2.3 MMOL/L (ref -30–30)
BASOPHILS # BLD AUTO: 0.02 10*3/MM3 (ref 0–0.2)
BASOPHILS NFR BLD AUTO: 0.3 % (ref 0–1.5)
BDY SITE: ABNORMAL
BDY SITE: ABNORMAL
BILIRUB SERPL-MCNC: 0.3 MG/DL (ref 0–1.2)
BLD GP AB SCN SERPL QL: NEGATIVE
BUN SERPL-MCNC: 6 MG/DL (ref 6–20)
BUN/CREAT SERPL: 11.3 (ref 7–25)
CALCIUM SPEC-SCNC: 8.9 MG/DL (ref 8.6–10.5)
CHLORIDE SERPL-SCNC: 102 MMOL/L (ref 98–107)
CO2 SERPL-SCNC: 18.8 MMOL/L (ref 22–29)
COLLECT TME SMN: ABNORMAL
CREAT SERPL-MCNC: 0.53 MG/DL (ref 0.57–1)
CREAT UR-MCNC: 148.5 MG/DL
DEPRECATED RDW RBC AUTO: 37.4 FL (ref 37–54)
EGFRCR SERPLBLD CKD-EPI 2021: 130.2 ML/MIN/1.73
EOSINOPHIL # BLD AUTO: 0.06 10*3/MM3 (ref 0–0.4)
EOSINOPHIL NFR BLD AUTO: 0.8 % (ref 0.3–6.2)
ERYTHROCYTE [DISTWIDTH] IN BLOOD BY AUTOMATED COUNT: 12.6 % (ref 12.3–15.4)
GAS FLOW AIRWAY: 2 LPM
GAS FLOW AIRWAY: 2 LPM
GLOBULIN UR ELPH-MCNC: 2.8 GM/DL
GLUCOSE BLDC GLUCOMTR-MCNC: 110 MG/DL (ref 70–130)
GLUCOSE SERPL-MCNC: 147 MG/DL (ref 65–99)
GLUCOSE UR STRIP-MCNC: NEGATIVE MG/DL
HCO3 BLDCOA-SCNC: 27.2 MMOL/L (ref 22–28)
HCO3 BLDCOV-SCNC: 23.6 MMOL/L
HCT VFR BLD AUTO: 29.7 % (ref 34–46.6)
HGB BLD-MCNC: 10.2 G/DL (ref 12–15.9)
IMM GRANULOCYTES # BLD AUTO: 0.05 10*3/MM3 (ref 0–0.05)
IMM GRANULOCYTES NFR BLD AUTO: 0.7 % (ref 0–0.5)
LYMPHOCYTES # BLD AUTO: 1.33 10*3/MM3 (ref 0.7–3.1)
LYMPHOCYTES NFR BLD AUTO: 17.6 % (ref 19.6–45.3)
MCH RBC QN AUTO: 28 PG (ref 26.6–33)
MCHC RBC AUTO-ENTMCNC: 34.3 G/DL (ref 31.5–35.7)
MCV RBC AUTO: 81.6 FL (ref 79–97)
MODALITY: ABNORMAL
MODALITY: ABNORMAL
MONOCYTES # BLD AUTO: 0.32 10*3/MM3 (ref 0.1–0.9)
MONOCYTES NFR BLD AUTO: 4.2 % (ref 5–12)
NEUTROPHILS NFR BLD AUTO: 5.79 10*3/MM3 (ref 1.7–7)
NEUTROPHILS NFR BLD AUTO: 76.4 % (ref 42.7–76)
NOTE: ABNORMAL
NOTE: ABNORMAL
NRBC BLD AUTO-RTO: 0 /100 WBC (ref 0–0.2)
PCO2 BLDCOA: 54.7 MMHG (ref 43–63)
PCO2 BLDCOV: 43.3 MM HG (ref 35–51.3)
PH BLDCOA: 7.31 PH UNITS (ref 7.18–7.34)
PH BLDCOV: 7.34 PH UNITS (ref 7.26–7.4)
PLATELET # BLD AUTO: 329 10*3/MM3 (ref 140–450)
PMV BLD AUTO: 8.9 FL (ref 6–12)
PO2 BLDCOA: <21.2 MMHG (ref 12–26)
PO2 BLDCOV: 38.1 MM HG (ref 19–39)
POTASSIUM SERPL-SCNC: 3.5 MMOL/L (ref 3.5–5.2)
PROT ?TM UR-MCNC: 17.8 MG/DL
PROT SERPL-MCNC: 6.5 G/DL (ref 6–8.5)
PROT UR STRIP-MCNC: ABNORMAL MG/DL
PROT/CREAT UR: 119.9 MG/G CREA (ref 0–200)
RBC # BLD AUTO: 3.64 10*6/MM3 (ref 3.77–5.28)
RH BLD: POSITIVE
SAO2 % BLDCOA: 19.2 % (ref 92–99)
SAO2 % BLDCOA: 68.9 % (ref 92–99)
SAO2 % BLDCOV: ABNORMAL %
SODIUM SERPL-SCNC: 135 MMOL/L (ref 136–145)
T&S EXPIRATION DATE: NORMAL
WBC NRBC COR # BLD: 7.57 10*3/MM3 (ref 3.4–10.8)

## 2023-08-01 PROCEDURE — 25010000002 EPINEPHRINE 1 MG/ML SOLUTION 30 ML VIAL: Performed by: OBSTETRICS & GYNECOLOGY

## 2023-08-01 PROCEDURE — 25010000002 FENTANYL CITRATE (PF) 100 MCG/2ML SOLUTION: Performed by: ANESTHESIOLOGY

## 2023-08-01 PROCEDURE — 82803 BLOOD GASES ANY COMBINATION: CPT

## 2023-08-01 PROCEDURE — 25010000002 PROPOFOL 10 MG/ML EMULSION: Performed by: ANESTHESIOLOGY

## 2023-08-01 PROCEDURE — 25010000002 CEFAZOLIN PER 500 MG: Performed by: OBSTETRICS & GYNECOLOGY

## 2023-08-01 PROCEDURE — 84156 ASSAY OF PROTEIN URINE: CPT | Performed by: OBSTETRICS & GYNECOLOGY

## 2023-08-01 PROCEDURE — 25010000002 CLONIDINE PER 1 MG: Performed by: OBSTETRICS & GYNECOLOGY

## 2023-08-01 PROCEDURE — 25010000002 SUCCINYLCHOLINE PER 20 MG: Performed by: ANESTHESIOLOGY

## 2023-08-01 PROCEDURE — 80053 COMPREHEN METABOLIC PANEL: CPT | Performed by: OBSTETRICS & GYNECOLOGY

## 2023-08-01 PROCEDURE — 86850 RBC ANTIBODY SCREEN: CPT | Performed by: OBSTETRICS & GYNECOLOGY

## 2023-08-01 PROCEDURE — 25010000002 ROPIVACAINE PER 1 MG: Performed by: OBSTETRICS & GYNECOLOGY

## 2023-08-01 PROCEDURE — 25010000002 ONDANSETRON PER 1 MG: Performed by: ANESTHESIOLOGY

## 2023-08-01 PROCEDURE — 88307 TISSUE EXAM BY PATHOLOGIST: CPT

## 2023-08-01 PROCEDURE — S0260 H&P FOR SURGERY: HCPCS | Performed by: OBSTETRICS & GYNECOLOGY

## 2023-08-01 PROCEDURE — 25010000002 MIDAZOLAM PER 1 MG: Performed by: ANESTHESIOLOGY

## 2023-08-01 PROCEDURE — 25010000002 FENTANYL CITRATE (PF) 50 MCG/ML SOLUTION: Performed by: ANESTHESIOLOGY

## 2023-08-01 PROCEDURE — 82570 ASSAY OF URINE CREATININE: CPT | Performed by: OBSTETRICS & GYNECOLOGY

## 2023-08-01 PROCEDURE — 25010000002 KETOROLAC TROMETHAMINE PER 15 MG: Performed by: OBSTETRICS & GYNECOLOGY

## 2023-08-01 PROCEDURE — 25010000002 HYDROMORPHONE PER 4 MG: Performed by: ANESTHESIOLOGY

## 2023-08-01 PROCEDURE — 85025 COMPLETE CBC W/AUTO DIFF WBC: CPT | Performed by: OBSTETRICS & GYNECOLOGY

## 2023-08-01 PROCEDURE — 59515 CESAREAN DELIVERY: CPT | Performed by: OBSTETRICS & GYNECOLOGY

## 2023-08-01 PROCEDURE — 86900 BLOOD TYPING SEROLOGIC ABO: CPT | Performed by: OBSTETRICS & GYNECOLOGY

## 2023-08-01 PROCEDURE — 86901 BLOOD TYPING SEROLOGIC RH(D): CPT | Performed by: OBSTETRICS & GYNECOLOGY

## 2023-08-01 PROCEDURE — 82948 REAGENT STRIP/BLOOD GLUCOSE: CPT

## 2023-08-01 PROCEDURE — 25010000002 DEXAMETHASONE SODIUM PHOSPHATE 20 MG/5ML SOLUTION: Performed by: ANESTHESIOLOGY

## 2023-08-01 PROCEDURE — 25010000002 ESMOLOL 100 MG/10ML SOLUTION: Performed by: ANESTHESIOLOGY

## 2023-08-01 PROCEDURE — 25010000002 SUGAMMADEX 200 MG/2ML SOLUTION: Performed by: ANESTHESIOLOGY

## 2023-08-01 PROCEDURE — 25010000002 MORPHINE PER 10 MG: Performed by: ANESTHESIOLOGY

## 2023-08-01 RX ORDER — CEFAZOLIN SODIUM IN 0.9 % NACL 3 G/100 ML
3000 INTRAVENOUS SOLUTION, PIGGYBACK (ML) INTRAVENOUS ONCE
Status: COMPLETED | OUTPATIENT
Start: 2023-08-01 | End: 2023-08-01

## 2023-08-01 RX ORDER — FAMOTIDINE 10 MG/ML
20 INJECTION, SOLUTION INTRAVENOUS ONCE AS NEEDED
Status: COMPLETED | OUTPATIENT
Start: 2023-08-01 | End: 2023-08-01

## 2023-08-01 RX ORDER — ONDANSETRON 2 MG/ML
4 INJECTION INTRAMUSCULAR; INTRAVENOUS ONCE AS NEEDED
Status: COMPLETED | OUTPATIENT
Start: 2023-08-01 | End: 2023-08-01

## 2023-08-01 RX ORDER — MIDAZOLAM HYDROCHLORIDE 1 MG/ML
INJECTION INTRAMUSCULAR; INTRAVENOUS AS NEEDED
Status: DISCONTINUED | OUTPATIENT
Start: 2023-08-01 | End: 2023-08-01 | Stop reason: SURG

## 2023-08-01 RX ORDER — KETOROLAC TROMETHAMINE 30 MG/ML
30 INJECTION, SOLUTION INTRAMUSCULAR; INTRAVENOUS ONCE
Status: COMPLETED | OUTPATIENT
Start: 2023-08-01 | End: 2023-08-01

## 2023-08-01 RX ORDER — OXYTOCIN/0.9 % SODIUM CHLORIDE 30/500 ML
999 PLASTIC BAG, INJECTION (ML) INTRAVENOUS ONCE
Status: COMPLETED | OUTPATIENT
Start: 2023-08-01 | End: 2023-08-01

## 2023-08-01 RX ORDER — DEXAMETHASONE SODIUM PHOSPHATE 4 MG/ML
INJECTION, SOLUTION INTRA-ARTICULAR; INTRALESIONAL; INTRAMUSCULAR; INTRAVENOUS; SOFT TISSUE AS NEEDED
Status: DISCONTINUED | OUTPATIENT
Start: 2023-08-01 | End: 2023-08-01 | Stop reason: SURG

## 2023-08-01 RX ORDER — ONDANSETRON 2 MG/ML
INJECTION INTRAMUSCULAR; INTRAVENOUS AS NEEDED
Status: DISCONTINUED | OUTPATIENT
Start: 2023-08-01 | End: 2023-08-01 | Stop reason: SURG

## 2023-08-01 RX ORDER — CARBOPROST TROMETHAMINE 250 UG/ML
250 INJECTION, SOLUTION INTRAMUSCULAR
Status: DISCONTINUED | OUTPATIENT
Start: 2023-08-01 | End: 2023-08-01 | Stop reason: HOSPADM

## 2023-08-01 RX ORDER — HYDROMORPHONE HYDROCHLORIDE 1 MG/ML
0.5 INJECTION, SOLUTION INTRAMUSCULAR; INTRAVENOUS; SUBCUTANEOUS
Status: DISCONTINUED | OUTPATIENT
Start: 2023-08-01 | End: 2023-08-04 | Stop reason: HOSPADM

## 2023-08-01 RX ORDER — MORPHINE SULFATE 1 MG/ML
INJECTION, SOLUTION EPIDURAL; INTRATHECAL; INTRAVENOUS AS NEEDED
Status: DISCONTINUED | OUTPATIENT
Start: 2023-08-01 | End: 2023-08-01 | Stop reason: SURG

## 2023-08-01 RX ORDER — ACETAMINOPHEN 500 MG
1000 TABLET ORAL ONCE
Status: COMPLETED | OUTPATIENT
Start: 2023-08-01 | End: 2023-08-01

## 2023-08-01 RX ORDER — ESMOLOL HYDROCHLORIDE 10 MG/ML
INJECTION INTRAVENOUS AS NEEDED
Status: DISCONTINUED | OUTPATIENT
Start: 2023-08-01 | End: 2023-08-01 | Stop reason: SURG

## 2023-08-01 RX ORDER — OXYTOCIN/0.9 % SODIUM CHLORIDE 30/500 ML
250 PLASTIC BAG, INJECTION (ML) INTRAVENOUS CONTINUOUS
Status: DISPENSED | OUTPATIENT
Start: 2023-08-01 | End: 2023-08-01

## 2023-08-01 RX ORDER — SUCCINYLCHOLINE CHLORIDE 20 MG/ML
INJECTION INTRAMUSCULAR; INTRAVENOUS AS NEEDED
Status: DISCONTINUED | OUTPATIENT
Start: 2023-08-01 | End: 2023-08-01 | Stop reason: SURG

## 2023-08-01 RX ORDER — SODIUM CHLORIDE, SODIUM LACTATE, POTASSIUM CHLORIDE, CALCIUM CHLORIDE 600; 310; 30; 20 MG/100ML; MG/100ML; MG/100ML; MG/100ML
125 INJECTION, SOLUTION INTRAVENOUS CONTINUOUS
Status: DISCONTINUED | OUTPATIENT
Start: 2023-08-01 | End: 2023-08-02

## 2023-08-01 RX ORDER — TRISODIUM CITRATE DIHYDRATE AND CITRIC ACID MONOHYDRATE 500; 334 MG/5ML; MG/5ML
30 SOLUTION ORAL ONCE
Status: COMPLETED | OUTPATIENT
Start: 2023-08-01 | End: 2023-08-01

## 2023-08-01 RX ORDER — NALOXONE HCL 0.4 MG/ML
0.2 VIAL (ML) INJECTION AS NEEDED
Status: DISCONTINUED | OUTPATIENT
Start: 2023-08-01 | End: 2023-08-04 | Stop reason: HOSPADM

## 2023-08-01 RX ORDER — OXYCODONE HYDROCHLORIDE 5 MG/1
5 TABLET ORAL EVERY 4 HOURS PRN
Status: DISCONTINUED | OUTPATIENT
Start: 2023-08-01 | End: 2023-08-04 | Stop reason: HOSPADM

## 2023-08-01 RX ORDER — MISOPROSTOL 200 UG/1
800 TABLET ORAL ONCE AS NEEDED
Status: DISCONTINUED | OUTPATIENT
Start: 2023-08-01 | End: 2023-08-01 | Stop reason: HOSPADM

## 2023-08-01 RX ORDER — OXYCODONE HYDROCHLORIDE 10 MG/1
10 TABLET ORAL EVERY 4 HOURS PRN
Status: DISCONTINUED | OUTPATIENT
Start: 2023-08-01 | End: 2023-08-04 | Stop reason: HOSPADM

## 2023-08-01 RX ORDER — KETOROLAC TROMETHAMINE 15 MG/ML
15 INJECTION, SOLUTION INTRAMUSCULAR; INTRAVENOUS EVERY 6 HOURS
Status: COMPLETED | OUTPATIENT
Start: 2023-08-02 | End: 2023-08-02

## 2023-08-01 RX ORDER — IBUPROFEN 600 MG/1
600 TABLET ORAL EVERY 6 HOURS
Status: DISCONTINUED | OUTPATIENT
Start: 2023-08-03 | End: 2023-08-04 | Stop reason: HOSPADM

## 2023-08-01 RX ORDER — METHYLERGONOVINE MALEATE 0.2 MG/ML
200 INJECTION INTRAVENOUS ONCE AS NEEDED
Status: DISCONTINUED | OUTPATIENT
Start: 2023-08-01 | End: 2023-08-01 | Stop reason: HOSPADM

## 2023-08-01 RX ORDER — FENTANYL CITRATE 50 UG/ML
INJECTION, SOLUTION INTRAMUSCULAR; INTRAVENOUS AS NEEDED
Status: DISCONTINUED | OUTPATIENT
Start: 2023-08-01 | End: 2023-08-01 | Stop reason: SURG

## 2023-08-01 RX ORDER — HYDROCODONE BITARTRATE AND ACETAMINOPHEN 7.5; 325 MG/1; MG/1
1 TABLET ORAL ONCE AS NEEDED
Status: COMPLETED | OUTPATIENT
Start: 2023-08-01 | End: 2023-08-01

## 2023-08-01 RX ORDER — PROPOFOL 10 MG/ML
VIAL (ML) INTRAVENOUS AS NEEDED
Status: DISCONTINUED | OUTPATIENT
Start: 2023-08-01 | End: 2023-08-01 | Stop reason: SURG

## 2023-08-01 RX ORDER — OXYTOCIN/0.9 % SODIUM CHLORIDE 30/500 ML
125 PLASTIC BAG, INJECTION (ML) INTRAVENOUS CONTINUOUS PRN
Status: COMPLETED | OUTPATIENT
Start: 2023-08-01 | End: 2023-08-01

## 2023-08-01 RX ORDER — TRANEXAMIC ACID 10 MG/ML
1000 INJECTION, SOLUTION INTRAVENOUS ONCE AS NEEDED
Status: DISCONTINUED | OUTPATIENT
Start: 2023-08-01 | End: 2023-08-01 | Stop reason: HOSPADM

## 2023-08-01 RX ORDER — FENTANYL CITRATE 50 UG/ML
50 INJECTION, SOLUTION INTRAMUSCULAR; INTRAVENOUS
Status: DISCONTINUED | OUTPATIENT
Start: 2023-08-01 | End: 2023-08-04 | Stop reason: HOSPADM

## 2023-08-01 RX ORDER — ROCURONIUM BROMIDE 10 MG/ML
INJECTION, SOLUTION INTRAVENOUS AS NEEDED
Status: DISCONTINUED | OUTPATIENT
Start: 2023-08-01 | End: 2023-08-01 | Stop reason: SURG

## 2023-08-01 RX ADMIN — MIDAZOLAM 2 MG: 1 INJECTION INTRAMUSCULAR; INTRAVENOUS at 20:35

## 2023-08-01 RX ADMIN — CEFAZOLIN 3000 MG: 10 INJECTION, POWDER, FOR SOLUTION INTRAVENOUS at 19:52

## 2023-08-01 RX ADMIN — SODIUM CHLORIDE, POTASSIUM CHLORIDE, SODIUM LACTATE AND CALCIUM CHLORIDE 1000 ML: 600; 310; 30; 20 INJECTION, SOLUTION INTRAVENOUS at 19:52

## 2023-08-01 RX ADMIN — ESMOLOL HYDROCHLORIDE 30 MG: 100 INJECTION, SOLUTION INTRAVENOUS at 21:13

## 2023-08-01 RX ADMIN — FAMOTIDINE 20 MG: 10 INJECTION INTRAVENOUS at 19:23

## 2023-08-01 RX ADMIN — ONDANSETRON 4 MG: 2 INJECTION INTRAMUSCULAR; INTRAVENOUS at 21:05

## 2023-08-01 RX ADMIN — DEXAMETHASONE SODIUM PHOSPHATE 4 MG: 4 INJECTION, SOLUTION INTRAMUSCULAR; INTRAVENOUS at 21:01

## 2023-08-01 RX ADMIN — SUCCINYLCHOLINE CHLORIDE 120 MG: 20 INJECTION, SOLUTION INTRAMUSCULAR; INTRAVENOUS; PARENTERAL at 20:45

## 2023-08-01 RX ADMIN — HYDROMORPHONE HYDROCHLORIDE 0.5 MG: 1 INJECTION, SOLUTION INTRAMUSCULAR; INTRAVENOUS; SUBCUTANEOUS at 21:57

## 2023-08-01 RX ADMIN — ONDANSETRON 4 MG: 2 INJECTION INTRAMUSCULAR; INTRAVENOUS at 19:23

## 2023-08-01 RX ADMIN — KETOROLAC TROMETHAMINE 30 MG: 30 INJECTION, SOLUTION INTRAMUSCULAR at 21:23

## 2023-08-01 RX ADMIN — Medication 125 ML/HR: at 22:36

## 2023-08-01 RX ADMIN — MORPHINE SULFATE 2 MG: 1 INJECTION, SOLUTION EPIDURAL; INTRATHECAL; INTRAVENOUS at 21:22

## 2023-08-01 RX ADMIN — SUGAMMADEX 200 MG: 100 INJECTION, SOLUTION INTRAVENOUS at 21:26

## 2023-08-01 RX ADMIN — FENTANYL CITRATE 50 MCG: 50 INJECTION, SOLUTION INTRAMUSCULAR; INTRAVENOUS at 23:46

## 2023-08-01 RX ADMIN — ACETAMINOPHEN 1000 MG: 500 TABLET, FILM COATED ORAL at 19:23

## 2023-08-01 RX ADMIN — MORPHINE SULFATE 2 MG: 1 INJECTION, SOLUTION EPIDURAL; INTRATHECAL; INTRAVENOUS at 21:01

## 2023-08-01 RX ADMIN — HYDROCODONE BITARTRATE AND ACETAMINOPHEN 1 TABLET: 7.5; 325 TABLET ORAL at 23:25

## 2023-08-01 RX ADMIN — PROPOFOL 200 MG: 10 INJECTION, EMULSION INTRAVENOUS at 20:45

## 2023-08-01 RX ADMIN — ROCURONIUM BROMIDE 40 MG: 10 INJECTION, SOLUTION INTRAVENOUS at 20:48

## 2023-08-01 RX ADMIN — Medication 999 ML/HR: at 20:53

## 2023-08-01 RX ADMIN — HYDROMORPHONE HYDROCHLORIDE 0.5 MG: 1 INJECTION, SOLUTION INTRAMUSCULAR; INTRAVENOUS; SUBCUTANEOUS at 22:19

## 2023-08-01 RX ADMIN — FENTANYL CITRATE 100 MCG: 50 INJECTION, SOLUTION INTRAMUSCULAR; INTRAVENOUS at 20:45

## 2023-08-01 RX ADMIN — SODIUM CHLORIDE, POTASSIUM CHLORIDE, SODIUM LACTATE AND CALCIUM CHLORIDE 125 ML/HR: 600; 310; 30; 20 INJECTION, SOLUTION INTRAVENOUS at 15:04

## 2023-08-01 RX ADMIN — SODIUM CITRATE AND CITRIC ACID MONOHYDRATE 30 ML: 500; 334 SOLUTION ORAL at 19:23

## 2023-08-01 RX ADMIN — CLONIDINE HYDROCHLORIDE: 0.1 INJECTION, SOLUTION EPIDURAL at 21:08

## 2023-08-01 RX ADMIN — FENTANYL CITRATE 50 MCG: 50 INJECTION, SOLUTION INTRAMUSCULAR; INTRAVENOUS at 22:50

## 2023-08-01 NOTE — PLAN OF CARE
Problem: Adult Inpatient Plan of Care  Goal: Plan of Care Review  Outcome: Ongoing, Progressing  Flowsheets (Taken 2023 0517)  Progress: no change  Plan of Care Reviewed With: patient  Outcome Evaluation: pt scheduled for C/S at  for abnormal fhr and GDM.   with moderate variability and acels noted.  Goal: Patient-Specific Goal (Individualized)  Outcome: Ongoing, Progressing  Goal: Absence of Hospital-Acquired Illness or Injury  Outcome: Ongoing, Progressing  Goal: Optimal Comfort and Wellbeing  Outcome: Ongoing, Progressing  Goal: Readiness for Transition of Care  Outcome: Ongoing, Progressing  Intervention: Mutually Develop Transition Plan  Recent Flowsheet Documentation  Taken 2023 1528 by Shanika Lee, RN  Equipment Needed After Discharge: none  Equipment Currently Used at Home: none  Anticipated Changes Related to Illness: none  Transportation Anticipated: family or friend will provide  Transportation Concerns: none  Concerns to be Addressed: no discharge needs identified  Readmission Within the Last 30 Days: no previous admission in last 30 days  Patient/Family Anticipated Services at Transition: none  Patient/Family Anticipates Transition to:   home   home with family     Problem: Bleeding ( Delivery)  Goal: Bleeding is Controlled  Outcome: Ongoing, Progressing     Problem: Change in Fetal Wellbeing ( Delivery)  Goal: Stable Fetal Wellbeing  Outcome: Ongoing, Progressing     Problem: Infection ( Delivery)  Goal: Absence of Infection Signs and Symptoms  Outcome: Ongoing, Progressing     Problem: Respiratory Compromise ( Delivery)  Goal: Effective Oxygenation and Ventilation  Outcome: Ongoing, Progressing   Goal Outcome Evaluation:  Plan of Care Reviewed With: patient        Progress: no change  Outcome Evaluation: pt scheduled for C/S at  for abnormal fhr and GDM.   with moderate variability and acels noted.

## 2023-08-01 NOTE — H&P
[facility]  Obstetric History and Physical    Chief Complaint   Patient presents with    Hypertension-Pregnant     Pt presents to [facility] from office for HTN workup. Pt denies headache, blurred vision. Denies VB, LOF, or ctx. Pt placed on monitor. RN sent labs. VSS.       Subjective     Patient is a 28 y.o. female  currently at 36w3d, who had an abnormal BPP today in the office.  BPP was 6 out of 8 with 2 points off for breathing.  Patient has gestational diabetes mellitus A2 on metformin.  She reports her blood sugars have been elevated for about a week.  She reports 2-hour postprandials at 140 and fastings in the high 90s.  She was given a prescription for glyburide but could not get it from her pharmacy.  She is followed by M.  She has not been compliant with bringing in her logs.  Diastolic blood pressure was elevated today but repeat blood pressures were normal.  Protein creatinine ratio today is normal.  Liver enzymes and platelets are normal.    Patient has a history of a stillbirth.  Baby did have trisomy 18.    The following portions of the patients history were reviewed and updated as appropriate: past medical history, past surgical history, past family history, past social history, and problem list .       Prenatal Information:  Prenatal Results       Initial Prenatal Labs       Test Value Reference Range Date Time    Hemoglobin  11.8 g/dL 11.1 - 15.9 23 0939       11.1 g/dL 12.0 - 15.9 23 1026       10.9 g/dL 12.0 - 15.9 23 0441       12.9 g/dL 12.0 - 15.9 23 2137       11.8 g/dL 12.0 - 15.9 23 0010       12.5 g/dL 12.0 - 15.9 23 0940       12.4 g/dL 11.1 - 15.9 23 1354      ^ 12.0 g/dL 12.0 - 16.0 22 1501       12.0 g/dL 12.0 - 15.9 22 0339       12.6 g/dL 12.0 - 15.9 22 1432    Hematocrit  36.4 % 34.0 - 46.6 23 0939       33.3 % 34.0 - 46.6 23 1026       32.1 % 34.0 - 46.6 23 0441       39.0 % 34.0 - 46.6 23  2137       34.4 % 34.0 - 46.6 02/05/23 0010       38.5 % 34.0 - 46.6 01/19/23 0940       36.9 % 34.0 - 46.6 01/17/23 1354      ^ 34.9 % 36.0 - 46.0 12/30/22 1501       35.3 % 34.0 - 46.6 12/18/22 0339       38.3 % 34.0 - 46.6 12/17/22 1432    Platelets  374 x10E3/uL 150 - 450 04/14/23 0939       374 10*3/mm3 140 - 450 03/06/23 1026       283 10*3/mm3 140 - 450 03/01/23 0441       314 10*3/mm3 140 - 450 02/28/23 2137       351 10*3/mm3 140 - 450 02/05/23 0010       337 10*3/mm3 140 - 450 01/19/23 0940       377 x10E3/uL 150 - 450 01/17/23 1354      ^ 351 10*3/uL 140 - 440 12/30/22 1501       338 10*3/mm3 140 - 450 12/18/22 0339       315 10*3/mm3 140 - 450 12/17/22 1432    Rubella IgG  2.01 index Immune >0.99 01/17/23 1354    Hepatitis B SAg  Negative  Negative 01/17/23 1354    Hepatitis C Ab  <0.1 s/co ratio 0.0 - 0.9 01/17/23 1354    RPR  Non Reactive  Non Reactive 01/17/23 1354    T. Pallidum Ab         ABO  O   02/04/23 2320    Rh  Positive   02/04/23 2320    Antibody Screen  Negative   02/04/23 2320       Negative  Negative 01/17/23 1354    HIV  Non Reactive  Non Reactive 01/17/23 1354    Urine Culture  50,000 CFU/mL Mixed Suze Isolated   07/26/23 1959       No growth   06/04/23 1932       Final report   05/09/23 1339       Final report   03/29/23 1042       >100,000 CFU/mL Escherichia coli   02/28/23 2236       >100,000 CFU/mL Escherichia coli   02/05/23 0115       Final report   01/17/23 1324    Gonorrhea  Negative  Negative 01/17/23 1324    Chlamydia  Negative  Negative 01/17/23 1324    TSH  1.080 uIU/mL 0.450 - 4.500 01/17/23 1354    HgB A1c   5.80 % 4.80 - 5.60 02/07/23 1039    Varicella IgG ^ had infection   02/07/23     HgB Electrophoresis         Cystic fibrosis  ^ negative   02/07/23               Fetal testing        Test Value Reference Range Date Time    NIPT ^ low risk   02/07/23     MSAFP  *Screen Negative*   03/06/23 1026    AFP-4                  2nd and 3rd Trimester       Test Value Reference  Range Date Time    Hemoglobin (repeated)  10.2 g/dL 12.0 - 15.9 08/01/23 1257       10.4 g/dL 12.0 - 15.9 07/14/23 1343       12.1 g/dL 12.0 - 15.9 06/05/23 0946       11.3 g/dL 12.0 - 15.9 05/08/23 0225    Hematocrit (repeated)  29.7 % 34.0 - 46.6 08/01/23 1257       30.1 % 34.0 - 46.6 07/14/23 1343       35.8 % 34.0 - 46.6 06/05/23 0946       34.5 % 34.0 - 46.6 05/08/23 0225    Platelets   329 10*3/mm3 140 - 450 08/01/23 1257       318 10*3/mm3 140 - 450 07/14/23 1343       330 10*3/mm3 140 - 450 06/05/23 0946       322 10*3/mm3 140 - 450 05/08/23 0225       374 x10E3/uL 150 - 450 04/14/23 0939       374 10*3/mm3 140 - 450 03/06/23 1026       283 10*3/mm3 140 - 450 03/01/23 0441       314 10*3/mm3 140 - 450 02/28/23 2137       351 10*3/mm3 140 - 450 02/05/23 0010       337 10*3/mm3 140 - 450 01/19/23 0940       377 x10E3/uL 150 - 450 01/17/23 1354      ^ 351 10*3/uL 140 - 440 12/30/22 1501       338 10*3/mm3 140 - 450 12/18/22 0339       315 10*3/mm3 140 - 450 12/17/22 1432    GCT  163 mg/dL 65 - 139 03/06/23 1026    Antibody Screen (repeated)  Negative   02/04/23 2320       Negative  Negative 01/17/23 1354    GTT Fasting        GTT 1 Hr        GTT 2 Hr        GTT 3 Hr        Group B Strep  Negative  Negative 07/28/23 1532              Other testing        Test Value Reference Range Date Time    Parvo IgG         CMV IgG                   Drug Screening       Test Value Reference Range Date Time    Amphetamine Screen        Barbiturate Screen        Benzodiazepine Screen        Methadone Screen        Phencyclidine Screen        Opiates Screen        THC Screen        Cocaine Screen        Propoxyphene Screen        Buprenorphine Screen        Methamphetamine Screen        Oxycodone Screen        Tricyclic Antidepressants Screen                  Legend    ^: Historical                          External Prenatal Results       Pregnancy Outside Results - Transcribed From Office Records - See Scanned Records For  Details       Test Value Date Time    ABO  O  02/04/23 2320    Rh  Positive  02/04/23 2320    Antibody Screen  Negative  02/04/23 2320       Negative  01/17/23 1354    Varicella IgG ^ had infection  02/07/23     Rubella  2.01 index 01/17/23 1354    Hgb  10.2 g/dL 08/01/23 1257       10.4 g/dL 07/14/23 1343       12.1 g/dL 06/05/23 0946       11.3 g/dL 05/08/23 0225       11.8 g/dL 04/14/23 0939       11.1 g/dL 03/06/23 1026       10.9 g/dL 03/01/23 0441       12.9 g/dL 02/28/23 2137       11.8 g/dL 02/05/23 0010       12.5 g/dL 01/19/23 0940       12.4 g/dL 01/17/23 1354      ^ 12.0 g/dL 12/30/22 1501       12.0 g/dL 12/18/22 0339       12.6 g/dL 12/17/22 1432    Hct  29.7 % 08/01/23 1257       30.1 % 07/14/23 1343       35.8 % 06/05/23 0946       34.5 % 05/08/23 0225       36.4 % 04/14/23 0939       33.3 % 03/06/23 1026       32.1 % 03/01/23 0441       39.0 % 02/28/23 2137       34.4 % 02/05/23 0010       38.5 % 01/19/23 0940       36.9 % 01/17/23 1354      ^ 34.9 % 12/30/22 1501       35.3 % 12/18/22 0339       38.3 % 12/17/22 1432    Glucose Fasting GTT       Glucose Tolerance Test 1 hour       Glucose Tolerance Test 3 hour       Gonorrhea (discrete)  Negative  01/17/23 1324    Chlamydia (discrete)  Negative  01/17/23 1324    RPR  Non Reactive  01/17/23 1354    VDRL       Syphilis Antibody ^ <0.10 (index_val) 10/10/18 1043    HBsAg  Negative  01/17/23 1354    Herpes Simplex Virus PCR       Herpes Simplex VIrus Culture       HIV  Non Reactive  01/17/23 1354    Hep C RNA Quant PCR       Hep C Antibody  <0.1 s/co ratio 01/17/23 1354    AFP  16.3 ng/mL 03/06/23 1026    Group B Strep  Negative  07/28/23 1532    GBS Susceptibility to Clindamycin       GBS Susceptibility to Erythromycin       Fetal Fibronectin       Genetic Testing, Maternal Blood                 Drug Screening       Test Value Date Time    Urine Drug Screen       Amphetamine Screen ^ Negative (arb'U) 10/10/18 1043    Barbiturate Screen        Benzodiazepine Screen       Methadone Screen       Phencyclidine Screen       Opiates Screen ^ Negative  10/10/18 1043    THC Screen       Cocaine Screen       Propoxyphene Screen       Buprenorphine Screen       Methamphetamine Screen       Oxycodone Screen       Tricyclic Antidepressants Screen                 Legend    ^: Historical                             Past OB History:     OB History    Para Term  AB Living   3 2 1 1 0 1   SAB IAB Ectopic Molar Multiple Live Births   0 0 0 0 0 1      # Outcome Date GA Lbr Slick/2nd Weight Sex Delivery Anes PTL Lv   3 Current            2  2018        FD      Complications: Stillborn, abnormal   1 Term 06/19/15 39w6d  3090 g (6 lb 13 oz) F Vag-Spont Pud N ROCHELLE       Past Medical History: Past Medical History:   Diagnosis Date    Asthma     GERD (gastroesophageal reflux disease)     Gestational diabetes     Headache     with current pregnancy    Urogenital trichomoniasis       Past Surgical History No past surgical history on file.   Family History: Family History   Problem Relation Age of Onset    Diabetes Mother     Hypertension Mother     Diabetes Father     Hypertension Father     Diabetes Brother     Leukemia Brother     Breast cancer Paternal Grandmother     Ovarian cancer Neg Hx     Uterine cancer Neg Hx     Colon cancer Neg Hx       Social History:  reports that she has never smoked. She has never been exposed to tobacco smoke. She has never used smokeless tobacco.   reports that she does not currently use alcohol.   reports no history of drug use.        General ROS:  Patient reports good fetal movement.  No vaginal bleeding or contractions.    Objective       Vital Signs Range for the last 24 hours  Temperature: Temp:  [98.3 øF (36.8 øC)] 98.3 øF (36.8 øC)   Temp Source: Temp src: Oral   BP: BP: (114-131)/(70-92) 121/79   Pulse: Heart Rate:  [91-97] 92   Respirations: Resp:  [16] 16   SPO2:     O2 Amount (l/min):     O2 Devices     Weight:  Weight:  [121 kg (267 lb 12.8 oz)] 121 kg (267 lb 12.8 oz)     Physical Examination: General appearance - alert, well appearing, and in no distress  Mental status - normal mood, behavior, speech, dress, motor activity, and thought processes  Eyes - sclera anicteric  Abdomen -gravid, size equal to dates and nontender  Pelvic -cervix is closed thick and posterior  Extremities - no pedal edema noted    Presentation: Vertex by ultrasound   Cervix: Exam by:     Dilation:     Effacement:     Station:         Fetal Heart Rate Assessment   Method: Fetal HR Assessment Method: external   Beats/min: Fetal HR (beats/min): 135   Baseline: Fetal HR Baseline: normal range   Variability: Fetal HR Variability: moderate (amplitude range 6 to 25 bpm)   Accels: Fetal HR Accelerations: greater than/equal to 15 bpm, lasting at least 15 seconds   Decels: Fetal HR Decelerations: variable   Tracing Category:       Uterine Assessment   Method: Method: external tocotransducer   Frequency (min): Contraction Frequency (Minutes): irritable   Ctx Count in 10 min: Contractions in 10 Minutes: irritable   Duration:     Intensity: Contraction Intensity: mild by palpation   Intensity by IUPC:     Resting Tone: Uterine Resting Tone: soft by palpation   Resting Tone by IUPC:     [place] Units:       Laboratory Results:   Results from last 7 days   Lab Units 08/01/23  1257   WBC 10*3/mm3 7.57   HEMOGLOBIN g/dL 10.2*   HEMATOCRIT % 29.7*   PLATELETS 10*3/mm3 329     Results from last 7 days   Lab Units 08/01/23  1257   SODIUM mmol/L 135*   POTASSIUM mmol/L 3.5   CHLORIDE mmol/L 102   CO2 mmol/L 18.8*   BUN mg/dL 6   CREATININE mg/dL 0.53*   CALCIUM mg/dL 8.9   BILIRUBIN mg/dL 0.3   ALK PHOS U/L 69   ALT (SGPT) U/L 10   AST (SGOT) U/L 11   GLUCOSE mg/dL 147*         Radiology Review: Ultrasound today shows vertex presentation.  Estimated fetal weight is at the 43rd percentile.  Abdominal circumference at the 57th percentile.  Biophysical profile is 6  out of 8 with 2 points off for breathing.  CHARAN is normal at 17.      Assessment & Plan       Fetal heart rate decelerations affecting management of mother    Increased BMI    Gestational diabetes mellitus (GDM) controlled on oral hypoglycemic drug, antepartum    Obesity affecting pregnancy, antepartum    Pregnancy    Abnormal  ultrasound        Assessment:  1.  Intrauterine pregnancy at 36w3d gestation with non-reassuring fetal status.  Fetal heart rate tracing shows 3 decelerations.  Two appear to be variable decelerations and what appeared to be a late deceleration.  Other portions of the tracing are reassuring appearing with good variability and accelerations.  BPP was 6 out of 8 with 2 points off for breathing.  I discussed the case with Dr. Woodward who reviewed the patient's NST.  She did recommend proceeding with delivery.  I discussed with the patient that based on the decelerations I do not think the baby will tolerate labor well.  Patient was given the option of  section versus attempted at induction of labor.  Patient wishes to proceed with  section.  Patient did just eat on her way to the hospital.  She had fries and a hamburger.  We will keep the patient on the monitor.  If baby is overall reassuring we will delay  until the patient's stomach is emptied.  Plan for continuous monitoring.  2.  Gestational diabetes mellitus A2 with poor compliance with bringing in her logs.  History of stillbirth.  3.  Obstetrical history significant for gestational diabetes mellitus A2 and history of stillbirth  4.  GBS status:   Strep Gp B JAYY   Date Value Ref Range Status   2023 Negative Negative Final     Comment:     Centers for Disease Control and Prevention (CDC) and American Congress  of Obstetricians and Gynecologists (ACOG) guidelines for prevention of   group B streptococcal (GBS) disease specify co-collection of  a vaginal and rectal swab specimen to maximize  sensitivity of GBS  detection. Per the CDC and ACOG, swabbing both the lower vagina and  rectum substantially increases the yield of detection compared with  sampling the vagina alone.  Penicillin G, ampicillin, or cefazolin are indicated for intrapartum  prophylaxis of  GBS colonization. Reflex susceptibility  testing should be performed prior to use of clindamycin only on GBS  isolates from penicillin-allergic women who are considered a high risk  for anaphylaxis. Treatment with vancomycin without additional testing  is warranted if resistance to clindamycin is noted.         Plan:  1. Primary  scheduled.  We discussed  section in detail.  We have discussed risk of the procedure including risk of bleeding, infection, DVT and anesthesia.  2. Plan of care has been reviewed with patient  3.  Risks, benefits of treatment plan have been discussed.  4.  All questions have been answered.        Ethan Pugh MD  2023  14:44 EDT

## 2023-08-02 ENCOUNTER — PATIENT OUTREACH (OUTPATIENT)
Dept: LABOR AND DELIVERY | Facility: HOSPITAL | Age: 28
End: 2023-08-02
Payer: COMMERCIAL

## 2023-08-02 LAB
BASOPHILS # BLD AUTO: 0.01 10*3/MM3 (ref 0–0.2)
BASOPHILS NFR BLD AUTO: 0.1 % (ref 0–1.5)
DEPRECATED RDW RBC AUTO: 35.4 FL (ref 37–54)
EOSINOPHIL # BLD AUTO: 0 10*3/MM3 (ref 0–0.4)
EOSINOPHIL NFR BLD AUTO: 0 % (ref 0.3–6.2)
ERYTHROCYTE [DISTWIDTH] IN BLOOD BY AUTOMATED COUNT: 12.1 % (ref 12.3–15.4)
HCT VFR BLD AUTO: 26.8 % (ref 34–46.6)
HGB BLD-MCNC: 9 G/DL (ref 12–15.9)
IMM GRANULOCYTES # BLD AUTO: 0.13 10*3/MM3 (ref 0–0.05)
IMM GRANULOCYTES NFR BLD AUTO: 1 % (ref 0–0.5)
LYMPHOCYTES # BLD AUTO: 1.08 10*3/MM3 (ref 0.7–3.1)
LYMPHOCYTES NFR BLD AUTO: 7.9 % (ref 19.6–45.3)
MCH RBC QN AUTO: 27.3 PG (ref 26.6–33)
MCHC RBC AUTO-ENTMCNC: 33.6 G/DL (ref 31.5–35.7)
MCV RBC AUTO: 81.2 FL (ref 79–97)
MONOCYTES # BLD AUTO: 0.71 10*3/MM3 (ref 0.1–0.9)
MONOCYTES NFR BLD AUTO: 5.2 % (ref 5–12)
NEUTROPHILS NFR BLD AUTO: 11.74 10*3/MM3 (ref 1.7–7)
NEUTROPHILS NFR BLD AUTO: 85.8 % (ref 42.7–76)
NRBC BLD AUTO-RTO: 0 /100 WBC (ref 0–0.2)
PLATELET # BLD AUTO: 301 10*3/MM3 (ref 140–450)
PMV BLD AUTO: 8.8 FL (ref 6–12)
RBC # BLD AUTO: 3.3 10*6/MM3 (ref 3.77–5.28)
WBC NRBC COR # BLD: 13.67 10*3/MM3 (ref 3.4–10.8)

## 2023-08-02 PROCEDURE — 25010000002 KETOROLAC TROMETHAMINE PER 15 MG: Performed by: OBSTETRICS & GYNECOLOGY

## 2023-08-02 PROCEDURE — 85025 COMPLETE CBC W/AUTO DIFF WBC: CPT | Performed by: OBSTETRICS & GYNECOLOGY

## 2023-08-02 PROCEDURE — 25010000002 ENOXAPARIN PER 10 MG: Performed by: OBSTETRICS & GYNECOLOGY

## 2023-08-02 RX ORDER — PROMETHAZINE HYDROCHLORIDE 25 MG/1
25 TABLET ORAL ONCE AS NEEDED
Status: DISCONTINUED | OUTPATIENT
Start: 2023-08-02 | End: 2023-08-04 | Stop reason: HOSPADM

## 2023-08-02 RX ORDER — ONDANSETRON 2 MG/ML
4 INJECTION INTRAMUSCULAR; INTRAVENOUS ONCE AS NEEDED
Status: DISCONTINUED | OUTPATIENT
Start: 2023-08-02 | End: 2023-08-04 | Stop reason: HOSPADM

## 2023-08-02 RX ORDER — ACETAMINOPHEN 325 MG/1
650 TABLET ORAL EVERY 6 HOURS
Status: DISCONTINUED | OUTPATIENT
Start: 2023-08-03 | End: 2023-08-04 | Stop reason: HOSPADM

## 2023-08-02 RX ORDER — DIPHENHYDRAMINE HYDROCHLORIDE 50 MG/ML
12.5 INJECTION INTRAMUSCULAR; INTRAVENOUS
Status: DISCONTINUED | OUTPATIENT
Start: 2023-08-02 | End: 2023-08-04 | Stop reason: HOSPADM

## 2023-08-02 RX ORDER — ENOXAPARIN SODIUM 100 MG/ML
40 INJECTION SUBCUTANEOUS EVERY 24 HOURS
Status: DISCONTINUED | OUTPATIENT
Start: 2023-08-02 | End: 2023-08-04 | Stop reason: HOSPADM

## 2023-08-02 RX ORDER — HYDROCORTISONE 25 MG/G
CREAM TOPICAL 3 TIMES DAILY PRN
Status: DISCONTINUED | OUTPATIENT
Start: 2023-08-02 | End: 2023-08-04 | Stop reason: HOSPADM

## 2023-08-02 RX ORDER — FLUMAZENIL 0.1 MG/ML
0.2 INJECTION INTRAVENOUS AS NEEDED
Status: DISCONTINUED | OUTPATIENT
Start: 2023-08-02 | End: 2023-08-04 | Stop reason: HOSPADM

## 2023-08-02 RX ORDER — OXYCODONE AND ACETAMINOPHEN 7.5; 325 MG/1; MG/1
1 TABLET ORAL EVERY 4 HOURS PRN
Status: DISCONTINUED | OUTPATIENT
Start: 2023-08-02 | End: 2023-08-04 | Stop reason: HOSPADM

## 2023-08-02 RX ORDER — LABETALOL HYDROCHLORIDE 5 MG/ML
5 INJECTION, SOLUTION INTRAVENOUS
Status: DISCONTINUED | OUTPATIENT
Start: 2023-08-02 | End: 2023-08-04 | Stop reason: HOSPADM

## 2023-08-02 RX ORDER — DROPERIDOL 2.5 MG/ML
0.62 INJECTION, SOLUTION INTRAMUSCULAR; INTRAVENOUS
Status: DISCONTINUED | OUTPATIENT
Start: 2023-08-02 | End: 2023-08-04 | Stop reason: HOSPADM

## 2023-08-02 RX ORDER — EPHEDRINE SULFATE 50 MG/ML
5 INJECTION, SOLUTION INTRAVENOUS ONCE AS NEEDED
Status: DISCONTINUED | OUTPATIENT
Start: 2023-08-02 | End: 2023-08-04 | Stop reason: HOSPADM

## 2023-08-02 RX ORDER — IPRATROPIUM BROMIDE AND ALBUTEROL SULFATE 2.5; .5 MG/3ML; MG/3ML
3 SOLUTION RESPIRATORY (INHALATION) ONCE AS NEEDED
Status: DISCONTINUED | OUTPATIENT
Start: 2023-08-02 | End: 2023-08-04 | Stop reason: HOSPADM

## 2023-08-02 RX ORDER — DOCUSATE SODIUM 100 MG/1
100 CAPSULE, LIQUID FILLED ORAL 2 TIMES DAILY
Status: DISCONTINUED | OUTPATIENT
Start: 2023-08-02 | End: 2023-08-04 | Stop reason: HOSPADM

## 2023-08-02 RX ORDER — ACETAMINOPHEN 500 MG
1000 TABLET ORAL EVERY 6 HOURS
Status: DISPENSED | OUTPATIENT
Start: 2023-08-02 | End: 2023-08-03

## 2023-08-02 RX ORDER — SIMETHICONE 80 MG
80 TABLET,CHEWABLE ORAL
Status: DISCONTINUED | OUTPATIENT
Start: 2023-08-02 | End: 2023-08-04 | Stop reason: HOSPADM

## 2023-08-02 RX ORDER — PROMETHAZINE HYDROCHLORIDE 25 MG/1
25 SUPPOSITORY RECTAL ONCE AS NEEDED
Status: DISCONTINUED | OUTPATIENT
Start: 2023-08-02 | End: 2023-08-04 | Stop reason: HOSPADM

## 2023-08-02 RX ORDER — HYDRALAZINE HYDROCHLORIDE 20 MG/ML
5 INJECTION INTRAMUSCULAR; INTRAVENOUS
Status: DISCONTINUED | OUTPATIENT
Start: 2023-08-02 | End: 2023-08-04 | Stop reason: HOSPADM

## 2023-08-02 RX ORDER — HYDROXYZINE 50 MG/1
50 TABLET, FILM COATED ORAL EVERY 6 HOURS PRN
Status: DISCONTINUED | OUTPATIENT
Start: 2023-08-02 | End: 2023-08-04 | Stop reason: HOSPADM

## 2023-08-02 RX ORDER — FERROUS SULFATE 325(65) MG
325 TABLET ORAL
Status: DISCONTINUED | OUTPATIENT
Start: 2023-08-03 | End: 2023-08-04 | Stop reason: HOSPADM

## 2023-08-02 RX ORDER — ONDANSETRON 2 MG/ML
4 INJECTION INTRAMUSCULAR; INTRAVENOUS EVERY 6 HOURS PRN
Status: DISCONTINUED | OUTPATIENT
Start: 2023-08-02 | End: 2023-08-04 | Stop reason: HOSPADM

## 2023-08-02 RX ADMIN — OXYCODONE HYDROCHLORIDE 10 MG: 10 TABLET ORAL at 21:49

## 2023-08-02 RX ADMIN — KETOROLAC TROMETHAMINE 15 MG: 15 INJECTION, SOLUTION INTRAMUSCULAR; INTRAVENOUS at 23:39

## 2023-08-02 RX ADMIN — ACETAMINOPHEN 1000 MG: 500 TABLET, FILM COATED ORAL at 20:31

## 2023-08-02 RX ADMIN — DOCUSATE SODIUM 100 MG: 100 CAPSULE, LIQUID FILLED ORAL at 20:31

## 2023-08-02 RX ADMIN — ACETAMINOPHEN 1000 MG: 500 TABLET, FILM COATED ORAL at 09:47

## 2023-08-02 RX ADMIN — OXYCODONE HYDROCHLORIDE 5 MG: 5 TABLET ORAL at 17:13

## 2023-08-02 RX ADMIN — ACETAMINOPHEN 1000 MG: 500 TABLET, FILM COATED ORAL at 02:56

## 2023-08-02 RX ADMIN — SIMETHICONE 80 MG: 80 TABLET, CHEWABLE ORAL at 09:46

## 2023-08-02 RX ADMIN — ENOXAPARIN SODIUM 40 MG: 100 INJECTION SUBCUTANEOUS at 20:31

## 2023-08-02 RX ADMIN — KETOROLAC TROMETHAMINE 15 MG: 15 INJECTION, SOLUTION INTRAMUSCULAR; INTRAVENOUS at 17:12

## 2023-08-02 RX ADMIN — KETOROLAC TROMETHAMINE 15 MG: 15 INJECTION, SOLUTION INTRAMUSCULAR; INTRAVENOUS at 11:27

## 2023-08-02 RX ADMIN — OXYCODONE HYDROCHLORIDE 10 MG: 10 TABLET ORAL at 12:17

## 2023-08-02 RX ADMIN — SIMETHICONE 80 MG: 80 TABLET, CHEWABLE ORAL at 20:31

## 2023-08-02 RX ADMIN — KETOROLAC TROMETHAMINE 15 MG: 15 INJECTION, SOLUTION INTRAMUSCULAR; INTRAVENOUS at 05:08

## 2023-08-02 RX ADMIN — OXYCODONE HYDROCHLORIDE 10 MG: 10 TABLET ORAL at 03:02

## 2023-08-02 RX ADMIN — SIMETHICONE 80 MG: 80 TABLET, CHEWABLE ORAL at 17:13

## 2023-08-02 RX ADMIN — DOCUSATE SODIUM 100 MG: 100 CAPSULE, LIQUID FILLED ORAL at 09:46

## 2023-08-02 NOTE — PROGRESS NOTES
[facility]   PROGRESS NOTE    Post-Op Day 1 S/P   Subjective     Patient reports:  Pain is well controlled   Tolerating diet. Tolerating po -- normal.  Intake -- c/o of tolerating po solids.    flatus reported..  Vaginal bleeding is light.    ROS  Neuro: neg for headache or vision changes  Pulm: neg for soa  CV: neg for chest pain  : neg for heavy bleeding  Musculoskeletal: neg for leg pain    Objective      Vitals: Vital Signs Range for the last 24 hours  Temperature: Temp:  [97.2 øF (36.2 øC)-99 øF (37.2 øC)] 97.3 øF (36.3 øC)   Temp Source: Temp src: Oral   BP: BP: ()/(37-92) 94/58   Pulse: Heart Rate:  [67-97] 75   Respirations: Resp:  [15-19] 18   SPO2: SpO2:  [96 %-100 %] 99 %   O2 Amount (l/min):     O2 Devices Device (Oxygen Therapy): room air   Weight:              Physical Exam    Lungs clear to auscultation bilaterally   Abdomen Soft, fundus firm at U-1   Incision  no drainage, no erythema, no swelling, well approximated   Extremities Bilateral lower ext with trace edema; no cords or tenderness; SCD's in place bilaterally     Labs:  Lab Results   Component Value Date    WBC 13.67 (H) 2023    HGB 9.0 (L) 2023    HCT 26.8 (L) 2023    MCV 81.2 2023     2023   O Positive     Assessment & Plan        Fetal heart rate decelerations affecting management of mother    Increased BMI    Gestational diabetes mellitus (GDM) controlled on oral hypoglycemic drug, antepartum    Obesity affecting pregnancy, antepartum    Pregnancy    Abnormal  ultrasound    Postpartum anemia      Assessment:    Jessy Hope is Day 1  post-partum    , Low Transverse       Pt is doing well this afternoon and denies any issues.        Postpartum anemia: pt is asymptomatic and hgb is stable from admit level. Iron supplement ordered    Increased BMI: lovenox ordered for DVT prophylaxis    Pt desires infant circumcision: staff advised hold today due to  glucose monitoring     Plan:  continue post op care and ambulation encouraged .        Liv Marie MD  8/2/2023  17:02 EDT

## 2023-08-02 NOTE — OP NOTE
Operative Note    Preoperative diagnosis: 28-year-old  3 para 1-1-0-1 at 36-3/7 weeks with fetal heart rate decelerations, gestational diabetes mellitus A2 and abnormal BPP    Postoperative diagnosis: Same plus true knot in the cord    Procedure: Primary  section    Surgeon: Dr. Pugh    Assistant: Dr. Mendez    Anesthesia: After several unsuccessful attempts to place spinal patient was placed under general anesthesia.    Findings: Viable male infant weighing 5  Pounds 15 ounces, Apgar of 9 and  9, amniotic fluid was clear.  Nuchal cord x1 was reduced and there was a true knot in the cord.  Placenta was complete with a three-vessel cord.  Ovaries and fallopian tubes appear normal    Quantitative blood loss:  See epic record    Specimens: placenta to path    Medications: Preoperative antibiotics were given    Description of the procedure: Patient was taken to the to the operating room where spinal anesthesia was attempted by anesthesia.  They were unable to place spinal anesthesia.  Patient mentioned that with her last pregnancy they were also unable to place an epidural.  Patient wished to proceed with general anesthesia.  The patient was then placed in the dorsal supine position with a left lateral tilt.  The patient was prepped and draped in the normal sterile fashion and a Buenrostro catheter was sterilely placed.  Timeout was completed.  The patient was placed under general anesthesia.  When I got the okay from the anesthesiologist the case was started.      A Pfannenstiel skin incision was made with the knife and carried down to the level of the fascia.  The fascial incision was extended bilaterally with the Wright scissors.  The fascia was then grasped superiorly and inferiorly and both bluntly and sharply dissected away from the underlying rectus muscles.  The rectus muscles were  in the midline and the peritoneum was tented up and entered bluntly.  Peritoneal incision was extended superiorly  and inferiorly with good visualization of the bladder.  The bladder blade was placed and the bladder flap was created.  The bladder blade was placed behind the bladder flap.  A low transverse uterine incision was made and extended with the bandage scissors.  The fetal head was gently delivered into the incision.  The baby shoulders and body were gently delivered.  The nares and mouth were bulb suctioned.  After 30 seconds of cord pulsations the cord was clamped and cut.  Baby was taken to the warmer for evaluation.  Cord blood was collected.  The placenta was delivered and noted to be complete.    Uterus was then cleared of all clots.  The corners and lower edge of the uterine incision were grasped with the Allis clamps.  The uterus was closed in 2 layers of 0 Vicryl.  The first layer was a running locking layer.  The second layer was an imbricating layer.  Excellent hemostasis was noted.  Both ovaries were inspected and noted to be normal.  The uterine incision was reexamined and no bleeding was seen.   The rectus muscles were inspected and small areas of bleeding were bovied.  The fascia was closed with 0 Vicryl.  Subcutaneous tissues were irrigated and small areas of bleeding were bovied.  The incision was injected with the OB cocktail for pain relief which contains bupivacaine, epinephrine and clonidine.  Subcutaneous tissues were closed with 3-0 Vicryl.  The skin was closed with 4-0 Monocryl.  Dermabond was placed over the incision.  Sterile dressing was placed.  All lap and instrument counts were correct.    Surgical assistant was responsible for performing the following activities: Retraction, suction, irrigation, and aiding with delivery of the baby. This skilled assistance was necessary for the success of this case.    Ethan Pugh M.D.

## 2023-08-02 NOTE — LACTATION NOTE
Gave personal pump  Lactation Consult Note    Evaluation Completed: 2023 15:49 EDT  Patient Name: Jessy Hope  :  1995  MRN:  [id number]     REFERRAL  INFORMATION:                                         DELIVERY HISTORY:        Skin to skin initiation date/time:      Skin to skin end date/time:           MATERNAL ASSESSMENT:                               INFANT ASSESSMENT:  Information for the patient's :  Chucky Hope [[id number]]   No past medical history on file.                                                                                                   MATERNAL INFANT FEEDING:                                                                       EQUIPMENT TYPE:                                 BREAST PUMPING:          LACTATION REFERRALS:

## 2023-08-02 NOTE — LACTATION NOTE
Pt reports baby latched for 15 min on each breast this morning. Baby is also getting formula supplement. Encouraged pt to latch baby at least every 2-3 hours. She denies questions at this time. Encouraged to call LC as needed. Faxed script for personal pump    Lactation Consult Note    Evaluation Completed: 2023 11:24 EDT  Patient Name: Jessy Hope  :  1995  MRN:  [id number]     REFERRAL  INFORMATION:                                         DELIVERY HISTORY:        Skin to skin initiation date/time:      Skin to skin end date/time:           MATERNAL ASSESSMENT:                               INFANT ASSESSMENT:  Information for the patient's :  Chucky Hope [[id number]]   No past medical history on file.                                                                                                   MATERNAL INFANT FEEDING:                                                                       EQUIPMENT TYPE:                                 BREAST PUMPING:          LACTATION REFERRALS:

## 2023-08-02 NOTE — PLAN OF CARE
Problem: Adult Inpatient Plan of Care  Goal: Plan of Care Review  Outcome: Ongoing, Progressing  Flowsheets (Taken 8/1/2023 1824 by Shanika Lee RN)  Progress: no change  Plan of Care Reviewed With: patient  Goal: Patient-Specific Goal (Individualized)  Outcome: Ongoing, Progressing  Goal: Absence of Hospital-Acquired Illness or Injury  Outcome: Ongoing, Progressing  Intervention: Identify and Manage Fall Risk  Description: Perform standard risk assessment on admission using a validated tool or comprehensive approach appropriate to the patient; reassess fall risk frequently, with change in status or transfer to another level of care.  Communicate fall injury risk to interprofessional healthcare team.  Determine need for increased observation, equipment and environmental modification, such as low bed, signage and supportive, nonskid footwear.  Adjust safety measures to individual developmental age, stage and identified risk factors.  Reinforce the importance of safety and physical activity with patient and family.  Perform regular intentional rounding to assess need for position change, pain assessment and personal needs, including assistance with toileting.  Recent Flowsheet Documentation  Taken 8/2/2023 0622 by Lorena Reyes RN  Safety Promotion/Fall Prevention: safety round/check completed  Taken 8/2/2023 0550 by Lorena Reyes RN  Safety Promotion/Fall Prevention: safety round/check completed  Taken 8/2/2023 0445 by Lorena Reyes RN  Safety Promotion/Fall Prevention: safety round/check completed  Taken 8/2/2023 0345 by Lorena Reyes RN  Safety Promotion/Fall Prevention: safety round/check completed  Taken 8/2/2023 0240 by Lorena Reyes RN  Safety Promotion/Fall Prevention: safety round/check completed  Taken 8/2/2023 0140 by Lorena Reyes RN  Safety Promotion/Fall Prevention: safety round/check completed  Taken  8/2/2023 0040 by Lorena Reyes RN  Safety Promotion/Fall Prevention: safety round/check completed  Taken 8/2/2023 0010 by Lorena Reyes RN  Safety Promotion/Fall Prevention: safety round/check completed  Intervention: Prevent and Manage VTE (Venous Thromboembolism) Risk  Description: Assess for VTE (venous thromboembolism) risk.  Encourage and assist with early ambulation.  Initiate and maintain compression or other therapy, as indicated, based on identified risk in accordance with organizational protocol and provider order.  Encourage both active and passive leg exercises while in bed, if unable to ambulate.  Recent Flowsheet Documentation  Taken 8/2/2023 0550 by oLrena Reyes RN  Activity Management: ambulated to bathroom  Taken 8/2/2023 0010 by Lorena Reyes RN  VTE Prevention/Management:   bilateral   sequential compression devices on  Goal: Optimal Comfort and Wellbeing  Outcome: Ongoing, Progressing  Intervention: Monitor Pain and Promote Comfort  Description: Assess pain level, treatment efficacy and patient response at regular intervals using a consistent pain scale.  Consider the presence and impact of preexisting chronic pain.  Encourage patient and caregiver involvement in pain assessment, interventions and safety measures.  Recent Flowsheet Documentation  Taken 8/2/2023 0550 by Lorena Reyes RN  Pain Management Interventions: relaxation techniques promoted  Taken 8/2/2023 0508 by Lorena Reyes RN  Pain Management Interventions: see MAR  Taken 8/2/2023 0345 by Lorena Reyes RN  Pain Management Interventions: relaxation techniques promoted  Taken 8/2/2023 0302 by Lorena Reyes RN  Pain Management Interventions: see MAR  Taken 8/2/2023 0257 by Lorena Reyes RN  Pain Management Interventions: see MAR  Taken 8/2/2023 0140 by Lorena Reyes RN  Pain  Management Interventions:   pain management plan reviewed with patient/caregiver   relaxation techniques promoted  Taken 8/2/2023 0040 by Lorena Reyes RN  Pain Management Interventions: relaxation techniques promoted  Taken 8/2/2023 0010 by Lorena Reyes RN  Pain Management Interventions:   pain management plan reviewed with patient/caregiver   relaxation techniques promoted  Intervention: Provide Person-Centered Care  Description: Use a family-focused approach to care.  Develop trust and rapport by proactively providing information, encouraging questions, addressing concerns and offering reassurance.  Acknowledge emotional response to hospitalization.  Recognize and utilize personal coping strategies.  Honor spiritual and cultural preferences.  Recent Flowsheet Documentation  Taken 8/2/2023 0010 by Lorena Reyes RN  Trust Relationship/Rapport: care explained  Goal: Readiness for Transition of Care  Outcome: Ongoing, Progressing   Goal Outcome Evaluation:

## 2023-08-03 PROCEDURE — 25010000002 ENOXAPARIN PER 10 MG: Performed by: OBSTETRICS & GYNECOLOGY

## 2023-08-03 RX ADMIN — DOCUSATE SODIUM 100 MG: 100 CAPSULE, LIQUID FILLED ORAL at 20:30

## 2023-08-03 RX ADMIN — OXYCODONE HYDROCHLORIDE 10 MG: 10 TABLET ORAL at 09:11

## 2023-08-03 RX ADMIN — ENOXAPARIN SODIUM 40 MG: 100 INJECTION SUBCUTANEOUS at 20:31

## 2023-08-03 RX ADMIN — IBUPROFEN 600 MG: 600 TABLET ORAL at 19:13

## 2023-08-03 RX ADMIN — ACETAMINOPHEN 650 MG: 325 TABLET, FILM COATED ORAL at 14:31

## 2023-08-03 RX ADMIN — ACETAMINOPHEN 650 MG: 325 TABLET, FILM COATED ORAL at 20:30

## 2023-08-03 RX ADMIN — FERROUS SULFATE TAB 325 MG (65 MG ELEMENTAL FE) 325 MG: 325 (65 FE) TAB at 08:39

## 2023-08-03 RX ADMIN — SIMETHICONE 80 MG: 80 TABLET, CHEWABLE ORAL at 20:31

## 2023-08-03 RX ADMIN — DOCUSATE SODIUM 100 MG: 100 CAPSULE, LIQUID FILLED ORAL at 08:40

## 2023-08-03 RX ADMIN — IBUPROFEN 600 MG: 600 TABLET ORAL at 06:09

## 2023-08-03 RX ADMIN — SIMETHICONE 80 MG: 80 TABLET, CHEWABLE ORAL at 17:04

## 2023-08-03 RX ADMIN — ACETAMINOPHEN 650 MG: 325 TABLET, FILM COATED ORAL at 08:41

## 2023-08-03 RX ADMIN — SIMETHICONE 80 MG: 80 TABLET, CHEWABLE ORAL at 12:03

## 2023-08-03 RX ADMIN — OXYCODONE HYDROCHLORIDE 5 MG: 5 TABLET ORAL at 17:04

## 2023-08-03 RX ADMIN — SIMETHICONE 80 MG: 80 TABLET, CHEWABLE ORAL at 08:39

## 2023-08-03 RX ADMIN — ACETAMINOPHEN 650 MG: 325 TABLET, FILM COATED ORAL at 02:25

## 2023-08-03 RX ADMIN — OXYCODONE HYDROCHLORIDE 10 MG: 10 TABLET ORAL at 23:51

## 2023-08-03 RX ADMIN — IBUPROFEN 600 MG: 600 TABLET ORAL at 12:03

## 2023-08-03 NOTE — PLAN OF CARE
Problem: Adult Inpatient Plan of Care  Goal: Plan of Care Review  Outcome: Ongoing, Progressing  Flowsheets (Taken 2023)  Progress: improving  Plan of Care Reviewed With:   patient   significant other  Outcome Evaluation: VSS, assessments wnl, voiding and passing gas, working on bresatfeeding, pain well controlled, ambulating well, bleeding scant to light.  Goal: Patient-Specific Goal (Individualized)  Outcome: Ongoing, Progressing  Goal: Absence of Hospital-Acquired Illness or Injury  Outcome: Ongoing, Progressing  Intervention: Identify and Manage Fall Risk  Recent Flowsheet Documentation  Taken 2023 by Joceline Sheehan RN  Safety Promotion/Fall Prevention: safety round/check completed  Intervention: Prevent Skin Injury  Recent Flowsheet Documentation  Taken 2023 by Joceline Sheehan RN  Body Position: position changed independently  Intervention: Prevent and Manage VTE (Venous Thromboembolism) Risk  Recent Flowsheet Documentation  Taken 2023 by Joceline Sheehan RN  Activity Management:   up ad gavin   activity encouraged  Goal: Optimal Comfort and Wellbeing  Outcome: Ongoing, Progressing  Intervention: Monitor Pain and Promote Comfort  Recent Flowsheet Documentation  Taken 2023 by Joceline Sheehan RN  Pain Management Interventions:   care clustered   quiet environment facilitated   see MAR  Intervention: Provide Person-Centered Care  Recent Flowsheet Documentation  Taken 2023 by Joceline Sheehan RN  Trust Relationship/Rapport:   care explained   choices provided  Goal: Readiness for Transition of Care  Outcome: Ongoing, Progressing     Problem: Bleeding ( Delivery)  Goal: Bleeding is Controlled  Outcome: Ongoing, Progressing     Problem: Change in Fetal Wellbeing ( Delivery)  Goal: Stable Fetal Wellbeing  Outcome: Ongoing, Progressing  Intervention: Promote and Monitor Fetal Wellbeing  Recent Flowsheet Documentation  Taken 2023 by Joceline Sheehan  RN  Body Position: position changed independently     Problem: Infection ( Delivery)  Goal: Absence of Infection Signs and Symptoms  Outcome: Ongoing, Progressing     Problem: Respiratory Compromise ( Delivery)  Goal: Effective Oxygenation and Ventilation  Outcome: Ongoing, Progressing     Problem: Skin Injury Risk Increased  Goal: Skin Health and Integrity  Outcome: Ongoing, Progressing     Problem: Adjustment to Role Transition (Postpartum  Delivery)  Goal: Successful Maternal Role Transition  Outcome: Ongoing, Progressing     Problem: Bleeding (Postpartum  Delivery)  Goal: Hemostasis  Outcome: Ongoing, Progressing     Problem: Infection (Postpartum  Delivery)  Goal: Absence of Infection Signs and Symptoms  Outcome: Ongoing, Progressing     Problem: Pain (Postpartum  Delivery)  Goal: Acceptable Pain Control  Outcome: Ongoing, Progressing  Intervention: Prevent or Manage Pain  Recent Flowsheet Documentation  Taken 2023 by Joceline Sheehan RN  Pain Management Interventions:   care clustered   quiet environment facilitated   see MAR     Problem: Postoperative Nausea and Vomiting (Postpartum  Delivery)  Goal: Nausea and Vomiting Relief  Outcome: Ongoing, Progressing     Problem: Postoperative Urinary Retention (Postpartum  Delivery)  Goal: Effective Urinary Elimination  Outcome: Ongoing, Progressing   Goal Outcome Evaluation:  Plan of Care Reviewed With: patient, significant other        Progress: improving  Outcome Evaluation: VSS, assessments wnl, voiding and passing gas, working on bresatfeeding, pain well controlled, ambulating well, bleeding scant to light.

## 2023-08-03 NOTE — PROGRESS NOTES
8/3/2023    Name:Jessy Hope    MR#:[id number]     Progress Note:  Post-Op day 2 S/P    HD:2    Subjective   28 y.o. yo Female  s/p CS at 36w3d doing well. Pain well controlled. Tolerating regular diet and having flatus. Lochia normal. Desires circ for her son today    Patient Active Problem List   Diagnosis    Family history of trisomy 18    Increased BMI    Urinary tract infection in mother during pregnancy, antepartum    ASCUS with positive high risk HPV cervical    Gestational diabetes mellitus (GDM) controlled on oral hypoglycemic drug, antepartum    Obesity affecting pregnancy, antepartum    Polyhydramnios in third trimester    Pregnancy    Fetal heart rate decelerations affecting management of mother    Abnormal  ultrasound    Postpartum anemia        Objective    Vitals  Temp:  Temp:  [98.2 øF (36.8 øC)-98.3 øF (36.8 øC)] 98.2 øF (36.8 øC)  Temp src: Oral  BP:  BP: (102-124)/(66-79) 124/79  Pulse:  Heart Rate:  [76-87] 87  RR:   Resp:  [16-18] 18  Weight:    BMI:  There is no height or weight on file to calculate BMI.      General Awake, alert, no distress  Abdomen Soft, non-distended, fundus firm, 2 cm below umbilicus, appropriately tender  Incision  Intact, no erythema or exudate  Extremities Calves NT bilaterally         I/O last 3 completed shifts:  In: 400 [P.O.:400]  Out: 2240 [Urine:1775; Blood:465]    LABS:   Lab Results   Component Value Date    WBC 13.67 (H) 2023    HGB 9.0 (L) 2023    HCT 26.8 (L) 2023    MCV 81.2 2023     2023       Infant: male       Assessment   1.  POD 2 CS for NRFS    Desires circumcision. Discussed is cosmetic. Risks of bleeding, damage to penis, infection and signs of infection, aftercare discussed. Desires to proceed.      Plan: Doing well.  Routine postoperative care      Fetal heart rate decelerations affecting management of mother    Increased BMI    Gestational diabetes mellitus (GDM)  controlled on oral hypoglycemic drug, antepartum    Obesity affecting pregnancy, antepartum    Pregnancy    Abnormal  ultrasound    Postpartum anemia      Shana Ludwig MD  8/3/2023 13:54 EDT

## 2023-08-03 NOTE — LACTATION NOTE
Pt resting in bed and baby sleeping. Pt reports baby not wanting to latch much. Baby is getting formula. Pt wanting personal pump review. Reviewed personal pump use and cleaning with pt. Encouraged to call LC when needing latch assistance. Encouraged pt to pump every 3 hours for 15 min if baby is not latching. Educated on milk coming in. Gave lanolin and educated on nipple care    Lactation Consult Note    Evaluation Completed: 8/3/2023 08:25 EDT  Patient Name: Jessy Hope  :  1995  MRN:  [id number]     REFERRAL  INFORMATION:                                         DELIVERY HISTORY:        Skin to skin initiation date/time:      Skin to skin end date/time:           MATERNAL ASSESSMENT:                               INFANT ASSESSMENT:  Information for the patient's :  Chucky Hope [[id number]]   No past medical history on file.                                                                                                   MATERNAL INFANT FEEDING:                                                                       EQUIPMENT TYPE:                                 BREAST PUMPING:          LACTATION REFERRALS:

## 2023-08-04 VITALS
HEART RATE: 80 BPM | SYSTOLIC BLOOD PRESSURE: 116 MMHG | DIASTOLIC BLOOD PRESSURE: 73 MMHG | TEMPERATURE: 97.9 F | RESPIRATION RATE: 18 BRPM | OXYGEN SATURATION: 99 %

## 2023-08-04 PROCEDURE — 0503F POSTPARTUM CARE VISIT: CPT | Performed by: OBSTETRICS & GYNECOLOGY

## 2023-08-04 RX ORDER — IBUPROFEN 600 MG/1
600 TABLET ORAL EVERY 6 HOURS
Qty: 24 TABLET | Refills: 0 | Status: SHIPPED | OUTPATIENT
Start: 2023-08-04

## 2023-08-04 RX ORDER — OXYCODONE HYDROCHLORIDE 5 MG/1
5 TABLET ORAL EVERY 6 HOURS PRN
Qty: 12 TABLET | Refills: 0 | Status: SHIPPED | OUTPATIENT
Start: 2023-08-04 | End: 2023-08-16

## 2023-08-04 RX ADMIN — OXYCODONE HYDROCHLORIDE 5 MG: 5 TABLET ORAL at 08:29

## 2023-08-04 RX ADMIN — ACETAMINOPHEN 650 MG: 325 TABLET, FILM COATED ORAL at 05:07

## 2023-08-04 RX ADMIN — DOCUSATE SODIUM 100 MG: 100 CAPSULE, LIQUID FILLED ORAL at 07:54

## 2023-08-04 RX ADMIN — SIMETHICONE 80 MG: 80 TABLET, CHEWABLE ORAL at 07:54

## 2023-08-04 RX ADMIN — IBUPROFEN 600 MG: 600 TABLET ORAL at 01:06

## 2023-08-04 RX ADMIN — IBUPROFEN 600 MG: 600 TABLET ORAL at 07:54

## 2023-08-04 RX ADMIN — FERROUS SULFATE TAB 325 MG (65 MG ELEMENTAL FE) 325 MG: 325 (65 FE) TAB at 07:54

## 2023-08-04 NOTE — DISCHARGE SUMMARY
Discharge note and  POSTPARTUM ROUNDS     Chief complaint: post C/S concerns  SUBJECTIVE:  Patient appears comfortable, and pain seems to be controlled with by mouth medicines.  She is ambulating. .  Discussed advancing activity and diet.        ROS:  Pulm: neg for soa  GI: neg for heavy bleeding  Musculoskel: neg for leg pain        OBJECTIVE:  Vital Signs: /73 (BP Location: Right arm, Patient Position: Lying)   Pulse 80   Temp 97.9 øF (36.6 øC) (Oral)   Resp 18   LMP 2022 (Exact Date)   SpO2 99%   Breastfeeding Yes      Intake/Output Summary (Last 24 hours) at 2023 0818  Last data filed at 8/3/2023 1100      Gross per 24 hour   Intake --   Output 1 ml   Net -1 ml         Constitutional:  The patient is well nourished.  Cardiovascular:  RRR  Resp:  nonlabored breathing  The incision is normal  Gastrointestinal:  fundus firm below umbilicus  Extremities:  no edema,no evidence dvt     LABS / IMAGING:  Lab Results (last 24 hours)         ** No results found for the last 24 hours. **                                     OB History    Para Term  AB Living   3 3 1 2 0 2   SAB IAB Ectopic Molar Multiple Live Births    0 0 0 0 0 2       # Outcome Date GA Lbr Slick/2nd Weight Sex Delivery Anes PTL Lv   3  23 36w3d   2710 g (5 lb 15.6 oz) M CS-LTranv Gen N ROCHELLE      Birth Comments: OR 1 Panda      Complications: Fetal Intolerance   2                 FD      Complications: Stillborn, abnormal   1 Term 06/19/15 39w6d   3090 g (6 lb 13 oz) F Vag-Spont Pud N ROCHELLE            ASSESSMENT AND PLAN:     Principal Problem:    Fetal heart rate decelerations affecting management of mother  Active Problems:    Increased BMI    Gestational diabetes mellitus (GDM) controlled on oral hypoglycemic drug, antepartum    Obesity affecting pregnancy, antepartum    Pregnancy    Abnormal  ultrasound      Overview: 6 out of 8 BPP    Postpartum anemia           Patient is  postop day 3 from LTCS  She is requesting discharge  Patient is doing well  Advance diet as tolerated  Ibuprofen Roxicodone prescribed, patient to call our office with any change of condition.     Regular diet   Encourage ambulation      Josh Jolley MD     8/4/2023  08:18 EDT

## 2023-08-04 NOTE — LACTATION NOTE
This note was copied from a baby's chart.  PT is going home today. Mom reports baby is latching some, but she is also pumping and formula feeding. Her milk is in, last time she got 60 ml.[facility] about [facility] and and mommy and Me info on the back of the educational booklet. Discussed engorgement, pumping and milk storage. PT declines any questions and concerns at this time. Encouraged to call LC if needing further assistance.

## 2023-08-10 ENCOUNTER — PATIENT OUTREACH (OUTPATIENT)
Dept: LABOR AND DELIVERY | Facility: HOSPITAL | Age: 28
End: 2023-08-10
Payer: COMMERCIAL

## 2023-08-10 NOTE — OUTREACH NOTE
Motherhood Connection  Postpartum Check-In    Questions/Answers      Flowsheet Row Responses   Visit Setting Telephone   Best Method for Contacting Cell   OB Discharge Note Reviewed  Reviewed   OB Discharge Medications Reviewed  Reviewed    discharged home with mother? Yes   Current Pain Levels 0-10 3   At Rest Pain Levels 0-10 3   Pain level with activity 0-10 5   Acceptable Pain Level 0-10 3   Pain Location Abdomen   Pain Description Incisional   How do you treat your pain? Medications   Verbalized Emotional State Acceptance   Family/Support Network Sibling, Significant Other   Level of Involvement in Care Attentive, Interactive, Supportive   Do you feel comfortable in your relationship with your baby? Yes   Have members of your household adjusted to your baby? Yes   Is the baby's father supportive and/or involved with the baby? Yes   How does your partner feel about the baby? Happy, Involved   Do you feel safe at home, school and work? Yes   Are you in a relationship with someone who threatens you or hurts you? No   Do you have the resources to keep yourself and your baby healthy and safe? Yes   Lochia (per patient report) Brown-terrence Red   Amount Scant   Number of pads per day 3   Lochia Odor None   Is patient breastfeeding? Yes, pumping   pumping - Left Breast Soft, Nontender   Pumping - Right Breast Soft, Nontender   Pumping - Left Nipple Intact   Pumping - Right Nipple Intact   Frequency occasional, has not pumped today or yesterday   Postpartum Depression Screening Education Education Provided   Doctor Appointments: Education Provided   Postpartum Care Education Education Provided   S & S to report Education Provided   Followup Appointments Made Yes   Well Child Visit Appointments Made Yes   Appointment Date 23   Provider/Agency [facility]   Well Child Checkup Provider Name Dr. [place]   Well Child Check Up Date: 23   Did you complete the visit? Yes   Were there any specific concerns? No    Umbilical Cord No reported signs or symptoms   Was the baby circumcised? Yes   Circumcision care and signs/symptoms to report Reviewed   Feeding Readiness Cues: Eager, Crying   Infant Feeding Method Breast, Formula   Breastfeeding Right   Time breastfeeding left: 10   Time breastfeeding right: 10   Frequency of feedings every 2 hours   Formula Type --  [similac ]   Formula PO (mL) 2 oz   Formula/Expressed Milk frequency of feedings: every 2 hours   Number of wet diapers x 24 hours 10   Last BM x 24 hours 5   Emesis (Unmeasured Occurence) none   What safe sleep surface is available? Crib   Are there stuffed animals, toys, pillows, quilts, blankets, wedges, positioners, bumpers or other loose bedding in the infant's sleeping environment? No   Where does the baby usually sleep? Crib   Does the baby ever share a sleep surface with a sibling, adult or pet? No   Does the baby ever share a sleep surface in a bed, couch, recliner or other? No   What position do you place your baby to sleep for naps? Back   What position do you place your baby to sleep at night Back   Are you and/or other caregivers smoking inside or outside the baby's home? No   Is the infant dressed appropiately for the temperature of the home? Yes   Do you use a clean, dry pacifier that is not attached to a string or stuffed animal? Yes            Review of Systems    Most Recent [place]  Depression Scale Score (EPDS)    Performed by a clinician: 7 (8/10/2023 12:44 PM)    Pt doing well, her pain is improving and she reports scant vaginal bleeding. She is breastfeeding and supplementing with formula as needed. She had her WIC appt and has her PP f/u scheduled. Infant has been seen at the peds office with no reported concerns. Reviewed EPDS. Will send to RN call center.    Maty Fields RN  Maternity Nurse Navigator    8/10/2023, 13:02 EDT

## 2023-08-16 ENCOUNTER — HOSPITAL ENCOUNTER (OUTPATIENT)
Dept: CARDIOLOGY | Facility: HOSPITAL | Age: 28
Discharge: HOME | End: 2023-08-16
Admitting: OBSTETRICS & GYNECOLOGY
Payer: COMMERCIAL

## 2023-08-16 ENCOUNTER — POSTPARTUM VISIT (OUTPATIENT)
Dept: OBSTETRICS AND GYNECOLOGY | Age: 28
End: 2023-08-16
Payer: COMMERCIAL

## 2023-08-16 VITALS
BODY MASS INDEX: 36.29 KG/M2 | DIASTOLIC BLOOD PRESSURE: 74 MMHG | HEIGHT: 69 IN | SYSTOLIC BLOOD PRESSURE: 118 MMHG | WEIGHT: 245 LBS

## 2023-08-16 DIAGNOSIS — M79.661 RIGHT CALF PAIN: ICD-10-CM

## 2023-08-16 DIAGNOSIS — O24.415 GESTATIONAL DIABETES MELLITUS (GDM) CONTROLLED ON ORAL HYPOGLYCEMIC DRUG, ANTEPARTUM: ICD-10-CM

## 2023-08-16 PROBLEM — O28.3 ABNORMAL ANTENATAL ULTRASOUND: Status: RESOLVED | Noted: 2023-08-01 | Resolved: 2023-08-16

## 2023-08-16 PROBLEM — O99.210 OBESITY AFFECTING PREGNANCY, ANTEPARTUM: Status: RESOLVED | Noted: 2018-10-13 | Resolved: 2023-08-16

## 2023-08-16 PROBLEM — O40.3XX0 POLYHYDRAMNIOS IN THIRD TRIMESTER: Status: RESOLVED | Noted: 2023-06-27 | Resolved: 2023-08-16

## 2023-08-16 PROBLEM — Z34.90 PREGNANCY: Status: RESOLVED | Noted: 2023-07-14 | Resolved: 2023-08-16

## 2023-08-16 LAB

## 2023-08-16 PROCEDURE — 93970 EXTREMITY STUDY: CPT

## 2023-08-16 NOTE — PROGRESS NOTES
"Chief Complaint   Patient presents with    Postpartum Care     2 week Postpartum Check, Repeat Low transverse  section delivery, Baby BOY ( 5lbs 15 oz), Pt is currently breastfeeding/supplementing, Pt C/O right side incision pain and smell         HPI  Jessy Hope is a 28 y.o. female is scheduled for postpartum visit. She is 2 weeks s/p c/s for abnormal fetal heart tracing.she complains of odor from incision. She denies fever/chills or heavy bleeding. She complains of pain in her right calf. She denies soa or chest pain.        The following portions of the patient's history were reviewed and updated as appropriate: allergies, current medications, past family history, past medical history, past social history, past surgical history, and problem list.    Review of Systems  Pertinent items are noted in HPI.    /74   Ht 175.3 cm (69\")   Wt 111 kg (245 lb)   LMP 2022 (Exact Date)   Breastfeeding Yes Comment: Breast / Formula supplementing  BMI 36.18 kg/mý         Physical Exam  Constitutional:       Appearance: Normal appearance.   Pulmonary:      Effort: Pulmonary effort is normal.   Abdominal:      Comments: Abd: soft, nontender, no guarding. Incision is dry/intact; no erythema, discharge or edema noted. Slight odor noted consistent with moisture under pannus. Pressure applied gently and no drainage from incision noted.    Musculoskeletal:      Comments: Bilateral lower ext are without edema, cords or focal tenderness   Neurological:      Mental Status: She is alert.           Diagnoses and all orders for this visit:    1. Postpartum care and examination (Primary)    2. Gestational diabetes mellitus (GDM) controlled on oral hypoglycemic drug, antepartum    3. Right calf pain  -     Duplex Venous Lower Extremity - Bilateral CAR; Future      No evidence of infection. Discussed need to keep incision clean/dry.     Plan stat doppler today, if normal, fu 1 week. Advised pt to proceed to ER for " soa/chest pain or worsening calf pain

## 2023-08-17 ENCOUNTER — PATIENT OUTREACH (OUTPATIENT)
Dept: CALL CENTER | Facility: HOSPITAL | Age: 28
End: 2023-08-17
Payer: COMMERCIAL

## 2023-08-17 NOTE — OUTREACH NOTE
Motherhood Connection Survey      Flowsheet Row Responses   [facility]   [facility] successful? Yes   Call start time 1552   Call end time 1559   Baby sex Boy   [place] discharged home with mother? Yes   Baby sex Boy   Delivery type    Emotional state Acceptance   Family support Yes   Do you have all necessary resources to care for you and your baby?  Yes   Have members of your household adjusted to your baby? Yes   Did you have any problems with pre-eclampsia during this pregnancy? No   Did you have blood glucose issues during this pregnancy No   Lochia amount Light   Lochia per patient report Rubra   Did you have an episiotomy/tear/abdominal incision? Yes   Feeding Method Combination   Frequency latches on ocasiona   Pumping Yes   Storage of Milk occasional pumping   Supplementing Formula, Breast Milk   Frequency q 3hrs   Amount 2 oz   Breast Condition No   Nipple Condition No   Nursing Interventions Lactation education provided   Number of wet diapers x 24 hours 8   Last BM x 24 hours 5   Umbilical Cord No reported signs or symptoms   Umbilical cord comments cord off   Was the baby circumcised? Yes   Circumcision care and signs/symptoms to report Reviewed   Circumcision comments healed   Where does the baby usually sleep? Crib   Are there stuffed animals, toys, pillows, quilts, blankets, wedges, positioners, bumpers or other loose bedding in the infant's sleeping environment? No   Does the baby ever share a sleep surface in a bed, couch, recliner or other? No   What position do you lay your baby down to sleep? Back   Are you and/or other caregivers smoking inside or outside the baby's home? No   Mom appointment comments: PP f/u done   Baby appointment comments: peds visits done   Additional comments eager to eat   Call completed? Yes   How satisfied were you with the Motherhood Connection Program? 5              Dagmar CLIFFORD - Registered Nurse          
18

## 2023-08-21 ENCOUNTER — POSTPARTUM VISIT (OUTPATIENT)
Dept: OBSTETRICS AND GYNECOLOGY | Age: 28
End: 2023-08-21
Payer: COMMERCIAL

## 2023-08-21 VITALS
SYSTOLIC BLOOD PRESSURE: 122 MMHG | WEIGHT: 247 LBS | BODY MASS INDEX: 36.58 KG/M2 | DIASTOLIC BLOOD PRESSURE: 74 MMHG | HEIGHT: 69 IN

## 2023-08-21 DIAGNOSIS — R87.610 ASCUS WITH POSITIVE HIGH RISK HPV CERVICAL: ICD-10-CM

## 2023-08-21 DIAGNOSIS — Z86.32 HISTORY OF GESTATIONAL DIABETES: ICD-10-CM

## 2023-08-21 DIAGNOSIS — R87.810 ASCUS WITH POSITIVE HIGH RISK HPV CERVICAL: ICD-10-CM

## 2023-08-21 PROBLEM — O23.40 URINARY TRACT INFECTION IN MOTHER DURING PREGNANCY, ANTEPARTUM: Status: RESOLVED | Noted: 2023-01-23 | Resolved: 2023-08-21

## 2023-08-21 PROBLEM — O36.8390 FETAL HEART RATE DECELERATIONS AFFECTING MANAGEMENT OF MOTHER: Status: RESOLVED | Noted: 2023-08-01 | Resolved: 2023-08-21

## 2023-08-21 PROCEDURE — 0503F POSTPARTUM CARE VISIT: CPT | Performed by: OBSTETRICS & GYNECOLOGY

## 2023-08-21 NOTE — PROGRESS NOTES
"Chief Complaint   Patient presents with    Postpartum Care     3 week Postpartum Check, Repeat Low transverse  section delivery, Baby BOY ( 5lbs 15 oz), Pt is currently breastfeeding/supplementing, Pt has no complaints today, Doing well, pt stating lower calf pain has improved, pt wanting to do Nexplanon for BC, Pt wanting to discuss placenta pathology report         HPI  Jessy Hope is a 28 y.o. female is scheduled for postop/postpartum visit. She had leg pain last week and was sent for doppler which was normal. She notes leg pain resolved. She denies heavy bleeding or incision issues. She wants to schedule nexplanon insertion.        The following portions of the patient's history were reviewed and updated as appropriate: allergies, current medications, past family history, past medical history, past social history, past surgical history, and problem list.    Review of Systems  Pertinent items are noted in HPI.    /74   Ht 175.3 cm (69\")   Wt 112 kg (247 lb)   LMP 2022 (Exact Date)   Breastfeeding Yes   BMI 36.48 kg/mý         Physical Exam  Constitutional:       Appearance: Normal appearance.   Pulmonary:      Effort: Pulmonary effort is normal.   Abdominal:      Comments: Incision clean/dry/intact; no surrounding erythema or swelling   Musculoskeletal:      Comments: Bilateral lower ext are without cords, tenderness or edema   Neurological:      Mental Status: She is alert.   Psychiatric:         Mood and Affect: Mood normal.         Behavior: Behavior normal.         Thought Content: Thought content normal.           Diagnoses and all orders for this visit:    1. Postpartum care and examination (Primary)    2. History of gestational diabetes    3. ASCUS with positive high risk HPV cervical    Requested pt book nexplanon insertion in 1 to 2 weeks    Will be do for final pp visit with colpo in 6 weeks due to abnormal pap hx.     Advised pt diabetes screening due to at 6 weeks as " well.

## 2023-08-28 ENCOUNTER — OFFICE VISIT (OUTPATIENT)
Dept: OBSTETRICS AND GYNECOLOGY | Age: 28
End: 2023-08-28
Payer: COMMERCIAL

## 2023-08-28 VITALS
DIASTOLIC BLOOD PRESSURE: 70 MMHG | HEIGHT: 69 IN | SYSTOLIC BLOOD PRESSURE: 126 MMHG | WEIGHT: 248 LBS | BODY MASS INDEX: 36.73 KG/M2

## 2023-08-28 DIAGNOSIS — Z30.017 NEXPLANON INSERTION: Primary | ICD-10-CM

## 2023-08-28 LAB
B-HCG UR QL: NEGATIVE
EXPIRATION DATE: NORMAL
INTERNAL NEGATIVE CONTROL: NORMAL
INTERNAL POSITIVE CONTROL: NORMAL
Lab: NORMAL

## 2023-08-28 NOTE — PROGRESS NOTES
Nexplanon Insertion    No LMP recorded (lmp unknown).    Date of procedure:  8/28/2023    Risks and benefits discussed? yes  All questions answered? yes  Consents given by the patient  Written consent obtained? yes    Local anesthesia used:  yes    Procedure documentation:     Urine pregnancy test was done and was NEGATIVE .  The risks (including infection,  bruising, irregular bleeding, pain at insertion site, and injury to muscles, nerves  and blood vessesl) and benefits of the procedure were explained to the patient  and/or guardian and Written informed consent was obtained. She especially  understands that her menstrual periods are expected to become irregular and  unpredictable throughout the time she is using the implant.  She has no  contraindications to the insertion.  Her questions have been answered.  She has  fully reviewed the FDA-approved consent brochure, has signed the consent  form, and wishes to proceed with the insertion today.     The upper right arm (dominant-pt requested right arm since she had some scarring r/t insertion of nexplanon in left arm x 2-also keloids) was marked at the intended site of insertion.  Betadine was used to cleanse the skin.  1 % lidocaine without epinephrine was injected.  The Nexplanon was placed subdermally without difficulty and according to manufacturers instructions.  The device was able to be palpated in the arm by both myself and the patient.  Steri-strips and band aid were then placed across the site of insertion and a pressure bandage was applied.    She tolerated the procedure well.  There were no complications.  EBL was minimal.    Post procedure instructions:          The patient was advised to call for any rash, arm pain, fever, warmth or for prolonged bruising or bleeding. Remove the wrapping in 24 hours and the steri-strips in 5 days.    Follow up needed: PRN    She has f/u scheduled in 2 wks with Dr Marie    Pt does admit to SA but says she has been using  condoms

## (undated) DEVICE — 3M(TM) TEGADERM(TM) TRANSPARENT FILM DRESSING FRAME STYLE 1627: Brand: 3M™ TEGADERM™

## (undated) DEVICE — ANTIBACTERIAL UNDYED BRAIDED (POLYGLACTIN 910), SYNTHETIC ABSORBABLE SUTURE: Brand: COATED VICRYL

## (undated) DEVICE — ADHS SKIN DERMABOND TOPICAL HI VISC

## (undated) DEVICE — GLV SURG TRIUMPH CLASSIC PF LTX 6 STRL

## (undated) DEVICE — SUT MNCRYL PLS ANTIB UD 4/0 PS2 18IN

## (undated) DEVICE — SUT VIC PLS 0 CTX 36IN UD VCP978H

## (undated) DEVICE — RETR PANNUS PANNI ADHS 1P/U LF STRL

## (undated) DEVICE — SOL IRR H2O BTL 1000ML STRL

## (undated) DEVICE — SLV SCD CALF HEMOFORCE DVT THERP REPROC MD